# Patient Record
Sex: FEMALE | Race: WHITE | Employment: OTHER | URBAN - NONMETROPOLITAN AREA
[De-identification: names, ages, dates, MRNs, and addresses within clinical notes are randomized per-mention and may not be internally consistent; named-entity substitution may affect disease eponyms.]

---

## 2022-11-17 ENCOUNTER — HOSPITAL ENCOUNTER (INPATIENT)
Age: 87
LOS: 2 days | Discharge: HOME HEALTH | DRG: 392 | End: 2022-11-21
Attending: EMERGENCY MEDICINE | Admitting: INTERNAL MEDICINE
Payer: MEDICARE

## 2022-11-17 ENCOUNTER — APPOINTMENT (OUTPATIENT)
Dept: GENERAL RADIOLOGY | Age: 87
DRG: 392 | End: 2022-11-17
Attending: FAMILY MEDICINE
Payer: MEDICARE

## 2022-11-17 DIAGNOSIS — R07.9 CHEST PAIN, UNSPECIFIED TYPE: Primary | ICD-10-CM

## 2022-11-17 LAB
ALBUMIN SERPL-MCNC: 3.3 G/DL (ref 3.4–5)
ALBUMIN/GLOB SERPL: 0.9 {RATIO} (ref 0.8–1.7)
ALP SERPL-CCNC: 68 U/L (ref 45–117)
ALT SERPL-CCNC: 38 U/L (ref 13–56)
ANION GAP SERPL CALC-SCNC: 6 MMOL/L (ref 3–18)
AST SERPL W P-5'-P-CCNC: 26 U/L (ref 10–38)
BASOPHILS # BLD: 0.1 K/UL (ref 0–0.1)
BASOPHILS NFR BLD: 1 % (ref 0–2)
BILIRUB SERPL-MCNC: 0.6 MG/DL (ref 0.2–1)
BNP SERPL-MCNC: 94 PG/ML (ref 0–1800)
BUN SERPL-MCNC: 26 MG/DL (ref 7–18)
BUN/CREAT SERPL: 22 (ref 12–20)
CA-I BLD-MCNC: 10.2 MG/DL (ref 8.5–10.1)
CHLORIDE SERPL-SCNC: 101 MMOL/L (ref 100–111)
CO2 SERPL-SCNC: 31 MMOL/L (ref 21–32)
CREAT SERPL-MCNC: 1.17 MG/DL (ref 0.6–1.3)
DIFFERENTIAL METHOD BLD: ABNORMAL
EOSINOPHIL # BLD: 0.1 K/UL (ref 0–0.4)
EOSINOPHIL NFR BLD: 1 % (ref 0–5)
ERYTHROCYTE [DISTWIDTH] IN BLOOD BY AUTOMATED COUNT: 13.4 % (ref 11.6–14.5)
GLOBULIN SER CALC-MCNC: 3.6 G/DL (ref 2–4)
GLUCOSE SERPL-MCNC: 158 MG/DL (ref 74–99)
HCT VFR BLD AUTO: 44.9 % (ref 35–45)
HGB BLD-MCNC: 15.3 G/DL (ref 12–16)
IMM GRANULOCYTES # BLD AUTO: 0.1 K/UL (ref 0–0.04)
IMM GRANULOCYTES NFR BLD AUTO: 1 % (ref 0–0.5)
INR PPP: 1 (ref 0.8–1.2)
LIPASE SERPL-CCNC: 132 U/L (ref 73–393)
LYMPHOCYTES # BLD: 2.5 K/UL (ref 0.9–3.6)
LYMPHOCYTES NFR BLD: 23 % (ref 21–52)
MCH RBC QN AUTO: 32.1 PG (ref 24–34)
MCHC RBC AUTO-ENTMCNC: 34.1 G/DL (ref 31–37)
MCV RBC AUTO: 94.3 FL (ref 78–100)
MONOCYTES # BLD: 1 K/UL (ref 0.05–1.2)
MONOCYTES NFR BLD: 9 % (ref 3–10)
NEUTS SEG # BLD: 7.1 K/UL (ref 1.8–8)
NEUTS SEG NFR BLD: 65 % (ref 40–73)
NRBC # BLD: 0 K/UL (ref 0–0.01)
NRBC BLD-RTO: 0 PER 100 WBC
PLATELET # BLD AUTO: 131 K/UL (ref 135–420)
PMV BLD AUTO: 12.3 FL (ref 9.2–11.8)
POTASSIUM SERPL-SCNC: 3.6 MMOL/L (ref 3.5–5.5)
PROT SERPL-MCNC: 6.9 G/DL (ref 6.4–8.2)
PROTHROMBIN TIME: 14.1 SEC (ref 11.5–15.2)
RBC # BLD AUTO: 4.76 M/UL (ref 4.2–5.3)
SODIUM SERPL-SCNC: 138 MMOL/L (ref 136–145)
TROPONIN-HIGH SENSITIVITY: 56 NG/L (ref 0–54)
TROPONIN-HIGH SENSITIVITY: 58 NG/L (ref 0–54)
WBC # BLD AUTO: 10.9 K/UL (ref 4.6–13.2)

## 2022-11-17 PROCEDURE — 85610 PROTHROMBIN TIME: CPT

## 2022-11-17 PROCEDURE — 83690 ASSAY OF LIPASE: CPT

## 2022-11-17 PROCEDURE — 83880 ASSAY OF NATRIURETIC PEPTIDE: CPT

## 2022-11-17 PROCEDURE — 93005 ELECTROCARDIOGRAM TRACING: CPT

## 2022-11-17 PROCEDURE — G0378 HOSPITAL OBSERVATION PER HR: HCPCS

## 2022-11-17 PROCEDURE — 84484 ASSAY OF TROPONIN QUANT: CPT

## 2022-11-17 PROCEDURE — 74011250637 HC RX REV CODE- 250/637: Performed by: EMERGENCY MEDICINE

## 2022-11-17 PROCEDURE — 71046 X-RAY EXAM CHEST 2 VIEWS: CPT

## 2022-11-17 PROCEDURE — 99285 EMERGENCY DEPT VISIT HI MDM: CPT

## 2022-11-17 PROCEDURE — 80053 COMPREHEN METABOLIC PANEL: CPT

## 2022-11-17 PROCEDURE — 85025 COMPLETE CBC W/AUTO DIFF WBC: CPT

## 2022-11-17 RX ORDER — ONDANSETRON 4 MG/1
4 TABLET, ORALLY DISINTEGRATING ORAL
Status: DISCONTINUED | OUTPATIENT
Start: 2022-11-17 | End: 2022-11-21 | Stop reason: HOSPADM

## 2022-11-17 RX ORDER — INSULIN GLARGINE 100 [IU]/ML
15 INJECTION, SOLUTION SUBCUTANEOUS
COMMUNITY
Start: 2022-10-17

## 2022-11-17 RX ORDER — ONDANSETRON 2 MG/ML
4 INJECTION INTRAMUSCULAR; INTRAVENOUS
Status: DISCONTINUED | OUTPATIENT
Start: 2022-11-17 | End: 2022-11-21 | Stop reason: HOSPADM

## 2022-11-17 RX ORDER — MAG HYDROX/ALUMINUM HYD/SIMETH 200-200-20
30 SUSPENSION, ORAL (FINAL DOSE FORM) ORAL
Status: COMPLETED | OUTPATIENT
Start: 2022-11-17 | End: 2022-11-17

## 2022-11-17 RX ORDER — SODIUM CHLORIDE 0.9 % (FLUSH) 0.9 %
5-40 SYRINGE (ML) INJECTION EVERY 8 HOURS
Status: DISCONTINUED | OUTPATIENT
Start: 2022-11-17 | End: 2022-11-21 | Stop reason: HOSPADM

## 2022-11-17 RX ORDER — ACETAMINOPHEN 650 MG/1
650 SUPPOSITORY RECTAL
Status: DISCONTINUED | OUTPATIENT
Start: 2022-11-17 | End: 2022-11-21 | Stop reason: HOSPADM

## 2022-11-17 RX ORDER — CLONIDINE 0.1 MG/24H
PATCH, EXTENDED RELEASE TRANSDERMAL
COMMUNITY
Start: 2022-10-18

## 2022-11-17 RX ORDER — ACETAMINOPHEN 325 MG/1
650 TABLET ORAL
Status: DISCONTINUED | OUTPATIENT
Start: 2022-11-17 | End: 2022-11-21 | Stop reason: HOSPADM

## 2022-11-17 RX ORDER — GLIMEPIRIDE 2 MG/1
2 TABLET ORAL DAILY
COMMUNITY
Start: 2022-10-15

## 2022-11-17 RX ORDER — HYDRALAZINE HYDROCHLORIDE 20 MG/ML
10 INJECTION INTRAMUSCULAR; INTRAVENOUS
Status: DISCONTINUED | OUTPATIENT
Start: 2022-11-17 | End: 2022-11-21 | Stop reason: HOSPADM

## 2022-11-17 RX ORDER — ATORVASTATIN CALCIUM 40 MG/1
40 TABLET, FILM COATED ORAL DAILY
COMMUNITY
Start: 2022-11-08

## 2022-11-17 RX ORDER — AMLODIPINE BESYLATE 5 MG/1
5 TABLET ORAL DAILY
COMMUNITY
Start: 2022-11-08

## 2022-11-17 RX ORDER — ATENOLOL 50 MG/1
50 TABLET ORAL DAILY
COMMUNITY
Start: 2022-11-01

## 2022-11-17 RX ORDER — HYDROCHLOROTHIAZIDE 25 MG/1
25 TABLET ORAL DAILY
COMMUNITY
Start: 2022-11-08

## 2022-11-17 RX ORDER — POLYETHYLENE GLYCOL 3350 17 G/17G
17 POWDER, FOR SOLUTION ORAL DAILY PRN
Status: DISCONTINUED | OUTPATIENT
Start: 2022-11-17 | End: 2022-11-21 | Stop reason: HOSPADM

## 2022-11-17 RX ORDER — ENOXAPARIN SODIUM 100 MG/ML
40 INJECTION SUBCUTANEOUS DAILY
Status: DISCONTINUED | OUTPATIENT
Start: 2022-11-18 | End: 2022-11-21 | Stop reason: HOSPADM

## 2022-11-17 RX ORDER — SODIUM CHLORIDE 0.9 % (FLUSH) 0.9 %
5-40 SYRINGE (ML) INJECTION AS NEEDED
Status: DISCONTINUED | OUTPATIENT
Start: 2022-11-17 | End: 2022-11-21 | Stop reason: HOSPADM

## 2022-11-17 RX ADMIN — ALUMINA, MAGNESIA, AND SIMETHICONE ORAL SUSPENSION REGULAR STRENGTH 30 ML: 1200; 1200; 120 SUSPENSION ORAL at 20:44

## 2022-11-17 RX ADMIN — NITROGLYCERIN 0.5 INCH: 20 OINTMENT TOPICAL at 19:30

## 2022-11-17 NOTE — ED TRIAGE NOTES
Pt. Has had two days of intermittent chest pain. Pain radiates in her jaw. Pt. Called EMS because her pain was not getting better. EMS gave 324 mg of ASA and 1 NTG.

## 2022-11-18 ENCOUNTER — APPOINTMENT (OUTPATIENT)
Dept: NON INVASIVE DIAGNOSTICS | Age: 87
DRG: 392 | End: 2022-11-18
Attending: NURSE PRACTITIONER
Payer: MEDICARE

## 2022-11-18 PROBLEM — I10 HYPERTENSION: Status: ACTIVE | Noted: 2022-11-18

## 2022-11-18 PROBLEM — E11.9 DIABETES MELLITUS TYPE 2, CONTROLLED (HCC): Status: ACTIVE | Noted: 2022-11-18

## 2022-11-18 LAB
ALBUMIN SERPL-MCNC: 3.1 G/DL (ref 3.4–5)
ALBUMIN/GLOB SERPL: 1 {RATIO} (ref 0.8–1.7)
ALP SERPL-CCNC: 59 U/L (ref 45–117)
ALT SERPL-CCNC: 30 U/L (ref 13–56)
ANION GAP SERPL CALC-SCNC: 7 MMOL/L (ref 3–18)
AST SERPL W P-5'-P-CCNC: 22 U/L (ref 10–38)
ATRIAL RATE: 72 BPM
BASOPHILS # BLD: 0.1 K/UL (ref 0–0.1)
BASOPHILS NFR BLD: 1 % (ref 0–2)
BILIRUB SERPL-MCNC: 0.9 MG/DL (ref 0.2–1)
BUN SERPL-MCNC: 22 MG/DL (ref 7–18)
BUN/CREAT SERPL: 22 (ref 12–20)
CA-I BLD-MCNC: 9.7 MG/DL (ref 8.5–10.1)
CALCULATED P AXIS, ECG09: -26 DEGREES
CALCULATED R AXIS, ECG10: 12 DEGREES
CALCULATED T AXIS, ECG11: 29 DEGREES
CHLORIDE SERPL-SCNC: 102 MMOL/L (ref 100–111)
CO2 SERPL-SCNC: 31 MMOL/L (ref 21–32)
CREAT SERPL-MCNC: 1.02 MG/DL (ref 0.6–1.3)
DIAGNOSIS, 93000: NORMAL
DIFFERENTIAL METHOD BLD: ABNORMAL
ECHO AO ASC DIAM: 3.7 CM
ECHO AO ASCENDING AORTA INDEX: 2.2 CM/M2
ECHO AO ROOT DIAM: 3.4 CM
ECHO AO ROOT INDEX: 2.02 CM/M2
ECHO AV AREA PEAK VELOCITY: 2.7 CM2
ECHO AV AREA VTI: 2.8 CM2
ECHO AV AREA/BSA PEAK VELOCITY: 1.6 CM2/M2
ECHO AV AREA/BSA VTI: 1.7 CM2/M2
ECHO AV MEAN GRADIENT: 6 MMHG
ECHO AV MEAN VELOCITY: 1.1 M/S
ECHO AV PEAK GRADIENT: 10 MMHG
ECHO AV PEAK VELOCITY: 1.6 M/S
ECHO AV VELOCITY RATIO: 0.88
ECHO AV VTI: 36.2 CM
ECHO EST RA PRESSURE: 3 MMHG
ECHO IVC PROX: 1.9 CM
ECHO LA AREA 2C: 17.3 CM2
ECHO LA AREA 4C: 17.9 CM2
ECHO LA DIAMETER INDEX: 2.44 CM/M2
ECHO LA DIAMETER: 4.1 CM
ECHO LA MAJOR AXIS: 5.8 CM
ECHO LA MINOR AXIS: 5.6 CM
ECHO LA TO AORTIC ROOT RATIO: 1.21
ECHO LA VOL BP: 46 ML (ref 22–52)
ECHO LA VOL/BSA BIPLANE: 27 ML/M2 (ref 16–34)
ECHO LV E' LATERAL VELOCITY: 8 CM/S
ECHO LV E' SEPTAL VELOCITY: 6 CM/S
ECHO LV EDV A2C: 33 ML
ECHO LV EDV A4C: 53 ML
ECHO LV EDV INDEX A4C: 32 ML/M2
ECHO LV EDV NDEX A2C: 20 ML/M2
ECHO LV EJECTION FRACTION A2C: 70 %
ECHO LV EJECTION FRACTION A4C: 69 %
ECHO LV EJECTION FRACTION BIPLANE: 70 % (ref 55–100)
ECHO LV ESV A2C: 10 ML
ECHO LV ESV A4C: 16 ML
ECHO LV ESV INDEX A2C: 6 ML/M2
ECHO LV ESV INDEX A4C: 10 ML/M2
ECHO LV FRACTIONAL SHORTENING: 36 % (ref 28–44)
ECHO LV INTERNAL DIMENSION DIASTOLE INDEX: 2.14 CM/M2
ECHO LV INTERNAL DIMENSION DIASTOLIC: 3.6 CM (ref 3.9–5.3)
ECHO LV INTERNAL DIMENSION SYSTOLIC INDEX: 1.37 CM/M2
ECHO LV INTERNAL DIMENSION SYSTOLIC: 2.3 CM
ECHO LV IVSD: 1.2 CM (ref 0.6–0.9)
ECHO LV MASS 2D: 150.6 G (ref 67–162)
ECHO LV MASS INDEX 2D: 89.7 G/M2 (ref 43–95)
ECHO LV POSTERIOR WALL DIASTOLIC: 1.3 CM (ref 0.6–0.9)
ECHO LV RELATIVE WALL THICKNESS RATIO: 0.72
ECHO LVOT AREA: 3.1 CM2
ECHO LVOT AV VTI INDEX: 0.9
ECHO LVOT DIAM: 2 CM
ECHO LVOT MEAN GRADIENT: 4 MMHG
ECHO LVOT PEAK GRADIENT: 8 MMHG
ECHO LVOT PEAK VELOCITY: 1.4 M/S
ECHO LVOT STROKE VOLUME INDEX: 61.1 ML/M2
ECHO LVOT SV: 102.7 ML
ECHO LVOT VTI: 32.7 CM
ECHO MV A VELOCITY: 1.12 M/S
ECHO MV AREA VTI: 3.1 CM2
ECHO MV E DECELERATION TIME (DT): 368 MS
ECHO MV E VELOCITY: 0.82 M/S
ECHO MV E/A RATIO: 0.73
ECHO MV E/E' LATERAL: 10.25
ECHO MV E/E' RATIO (AVERAGED): 11.96
ECHO MV E/E' SEPTAL: 13.67
ECHO MV LVOT VTI INDEX: 1.02
ECHO MV MAX VELOCITY: 1.1 M/S
ECHO MV MEAN GRADIENT: 1 MMHG
ECHO MV MEAN VELOCITY: 0.5 M/S
ECHO MV PEAK GRADIENT: 4 MMHG
ECHO MV VTI: 33.5 CM
ECHO PV MAX VELOCITY: 1.1 M/S
ECHO PV PEAK GRADIENT: 5 MMHG
ECHO RA AREA 4C: 14.9 CM2
ECHO RA END SYSTOLIC VOLUME APICAL 4 CHAMBER INDEX BSA: 21 ML/M2
ECHO RA VOLUME: 36 ML
ECHO RIGHT VENTRICULAR SYSTOLIC PRESSURE (RVSP): 30 MMHG
ECHO RV BASAL DIMENSION: 3.1 CM
ECHO RV LONGITUDINAL DIMENSION: 5.5 CM
ECHO RV MID DIMENSION: 2.4 CM
ECHO RV TAPSE: 1.7 CM (ref 1.7–?)
ECHO TV REGURGITANT MAX VELOCITY: 2.6 M/S
ECHO TV REGURGITANT PEAK GRADIENT: 27 MMHG
EOSINOPHIL # BLD: 0.1 K/UL (ref 0–0.4)
EOSINOPHIL NFR BLD: 1 % (ref 0–5)
ERYTHROCYTE [DISTWIDTH] IN BLOOD BY AUTOMATED COUNT: 13.5 % (ref 11.6–14.5)
GLOBULIN SER CALC-MCNC: 3.1 G/DL (ref 2–4)
GLUCOSE BLD STRIP.AUTO-MCNC: 105 MG/DL (ref 70–110)
GLUCOSE BLD STRIP.AUTO-MCNC: 209 MG/DL (ref 70–110)
GLUCOSE BLD STRIP.AUTO-MCNC: 245 MG/DL (ref 70–110)
GLUCOSE SERPL-MCNC: 175 MG/DL (ref 74–99)
HCT VFR BLD AUTO: 41.8 % (ref 35–45)
HGB BLD-MCNC: 13.9 G/DL (ref 12–16)
IMM GRANULOCYTES # BLD AUTO: 0 K/UL (ref 0–0.04)
IMM GRANULOCYTES NFR BLD AUTO: 0 % (ref 0–0.5)
LYMPHOCYTES # BLD: 2.8 K/UL (ref 0.9–3.6)
LYMPHOCYTES NFR BLD: 29 % (ref 21–52)
MAGNESIUM SERPL-MCNC: 1.8 MG/DL (ref 1.6–2.6)
MCH RBC QN AUTO: 31.6 PG (ref 24–34)
MCHC RBC AUTO-ENTMCNC: 33.3 G/DL (ref 31–37)
MCV RBC AUTO: 95 FL (ref 78–100)
MONOCYTES # BLD: 1 K/UL (ref 0.05–1.2)
MONOCYTES NFR BLD: 10 % (ref 3–10)
NEUTS SEG # BLD: 5.8 K/UL (ref 1.8–8)
NEUTS SEG NFR BLD: 59 % (ref 40–73)
NRBC # BLD: 0 K/UL (ref 0–0.01)
NRBC BLD-RTO: 0 PER 100 WBC
P-R INTERVAL, ECG05: 190 MS
PERFORMED BY, TECHID: ABNORMAL
PERFORMED BY, TECHID: ABNORMAL
PERFORMED BY, TECHID: NORMAL
PLATELET # BLD AUTO: 122 K/UL (ref 135–420)
PMV BLD AUTO: 12.5 FL (ref 9.2–11.8)
POTASSIUM SERPL-SCNC: 3.4 MMOL/L (ref 3.5–5.5)
PROT SERPL-MCNC: 6.2 G/DL (ref 6.4–8.2)
Q-T INTERVAL, ECG07: 393 MS
QRS DURATION, ECG06: 80 MS
QTC CALCULATION (BEZET), ECG08: 431 MS
RBC # BLD AUTO: 4.4 M/UL (ref 4.2–5.3)
SODIUM SERPL-SCNC: 140 MMOL/L (ref 136–145)
TROPONIN-HIGH SENSITIVITY: 59 NG/L (ref 0–54)
TROPONIN-HIGH SENSITIVITY: 60 NG/L (ref 0–54)
VENTRICULAR RATE, ECG03: 72 BPM
WBC # BLD AUTO: 9.8 K/UL (ref 4.6–13.2)

## 2022-11-18 PROCEDURE — 74011250636 HC RX REV CODE- 250/636: Performed by: NURSE PRACTITIONER

## 2022-11-18 PROCEDURE — 80053 COMPREHEN METABOLIC PANEL: CPT

## 2022-11-18 PROCEDURE — 84484 ASSAY OF TROPONIN QUANT: CPT

## 2022-11-18 PROCEDURE — 74011636637 HC RX REV CODE- 636/637: Performed by: NURSE PRACTITIONER

## 2022-11-18 PROCEDURE — 83735 ASSAY OF MAGNESIUM: CPT

## 2022-11-18 PROCEDURE — G0378 HOSPITAL OBSERVATION PER HR: HCPCS

## 2022-11-18 PROCEDURE — 74011000250 HC RX REV CODE- 250: Performed by: NURSE PRACTITIONER

## 2022-11-18 PROCEDURE — 74011250637 HC RX REV CODE- 250/637: Performed by: NURSE PRACTITIONER

## 2022-11-18 PROCEDURE — 36415 COLL VENOUS BLD VENIPUNCTURE: CPT

## 2022-11-18 PROCEDURE — 85025 COMPLETE CBC W/AUTO DIFF WBC: CPT

## 2022-11-18 PROCEDURE — 82962 GLUCOSE BLOOD TEST: CPT

## 2022-11-18 PROCEDURE — 74011250637 HC RX REV CODE- 250/637: Performed by: INTERNAL MEDICINE

## 2022-11-18 PROCEDURE — 93306 TTE W/DOPPLER COMPLETE: CPT

## 2022-11-18 PROCEDURE — 96372 THER/PROPH/DIAG INJ SC/IM: CPT

## 2022-11-18 RX ORDER — MAG HYDROX/ALUMINUM HYD/SIMETH 200-200-20
30 SUSPENSION, ORAL (FINAL DOSE FORM) ORAL
Status: DISCONTINUED | OUTPATIENT
Start: 2022-11-18 | End: 2022-11-21 | Stop reason: HOSPADM

## 2022-11-18 RX ORDER — AMLODIPINE BESYLATE 5 MG/1
5 TABLET ORAL DAILY
Status: DISCONTINUED | OUTPATIENT
Start: 2022-11-18 | End: 2022-11-21 | Stop reason: HOSPADM

## 2022-11-18 RX ORDER — GUAIFENESIN 100 MG/5ML
81 LIQUID (ML) ORAL DAILY
Status: DISCONTINUED | OUTPATIENT
Start: 2022-11-18 | End: 2022-11-21 | Stop reason: HOSPADM

## 2022-11-18 RX ORDER — INSULIN LISPRO 100 [IU]/ML
INJECTION, SOLUTION INTRAVENOUS; SUBCUTANEOUS
Status: DISCONTINUED | OUTPATIENT
Start: 2022-11-18 | End: 2022-11-21 | Stop reason: HOSPADM

## 2022-11-18 RX ORDER — FAMOTIDINE 20 MG/1
10 TABLET, FILM COATED ORAL 2 TIMES DAILY
Status: DISCONTINUED | OUTPATIENT
Start: 2022-11-18 | End: 2022-11-21 | Stop reason: HOSPADM

## 2022-11-18 RX ORDER — CLONIDINE 0.1 MG/24H
1 PATCH, EXTENDED RELEASE TRANSDERMAL
Status: DISCONTINUED | OUTPATIENT
Start: 2022-11-27 | End: 2022-11-20

## 2022-11-18 RX ORDER — INSULIN GLARGINE 100 [IU]/ML
15 INJECTION, SOLUTION SUBCUTANEOUS
Status: DISCONTINUED | OUTPATIENT
Start: 2022-11-18 | End: 2022-11-21 | Stop reason: HOSPADM

## 2022-11-18 RX ORDER — HYDROCHLOROTHIAZIDE 25 MG/1
25 TABLET ORAL DAILY
Status: DISCONTINUED | OUTPATIENT
Start: 2022-11-18 | End: 2022-11-21 | Stop reason: HOSPADM

## 2022-11-18 RX ORDER — ATORVASTATIN CALCIUM 40 MG/1
40 TABLET, FILM COATED ORAL DAILY
Status: DISCONTINUED | OUTPATIENT
Start: 2022-11-18 | End: 2022-11-21 | Stop reason: HOSPADM

## 2022-11-18 RX ORDER — ATENOLOL 25 MG/1
50 TABLET ORAL DAILY
Status: DISCONTINUED | OUTPATIENT
Start: 2022-11-18 | End: 2022-11-21 | Stop reason: HOSPADM

## 2022-11-18 RX ORDER — CLONIDINE 0.1 MG/24H
1 PATCH, EXTENDED RELEASE TRANSDERMAL
Status: DISCONTINUED | OUTPATIENT
Start: 2022-11-18 | End: 2022-11-18

## 2022-11-18 RX ADMIN — ASPIRIN 81 MG 81 MG: 81 TABLET ORAL at 09:02

## 2022-11-18 RX ADMIN — INSULIN LISPRO 4 UNITS: 100 INJECTION, SOLUTION INTRAVENOUS; SUBCUTANEOUS at 21:51

## 2022-11-18 RX ADMIN — INSULIN GLARGINE 15 UNITS: 100 INJECTION, SOLUTION SUBCUTANEOUS at 21:52

## 2022-11-18 RX ADMIN — HYDROCHLOROTHIAZIDE 25 MG: 25 TABLET ORAL at 09:03

## 2022-11-18 RX ADMIN — INSULIN LISPRO 4 UNITS: 100 INJECTION, SOLUTION INTRAVENOUS; SUBCUTANEOUS at 11:37

## 2022-11-18 RX ADMIN — SODIUM CHLORIDE, PRESERVATIVE FREE 10 ML: 5 INJECTION INTRAVENOUS at 14:03

## 2022-11-18 RX ADMIN — FAMOTIDINE 10 MG: 20 TABLET, FILM COATED ORAL at 17:22

## 2022-11-18 RX ADMIN — NITROGLYCERIN 1 INCH: 20 OINTMENT TOPICAL at 17:22

## 2022-11-18 RX ADMIN — SODIUM CHLORIDE, PRESERVATIVE FREE 10 ML: 5 INJECTION INTRAVENOUS at 21:52

## 2022-11-18 RX ADMIN — NITROGLYCERIN 1 INCH: 20 OINTMENT TOPICAL at 06:40

## 2022-11-18 RX ADMIN — ENOXAPARIN SODIUM 40 MG: 100 INJECTION SUBCUTANEOUS at 09:06

## 2022-11-18 RX ADMIN — ATORVASTATIN CALCIUM 40 MG: 40 TABLET, FILM COATED ORAL at 09:03

## 2022-11-18 RX ADMIN — SODIUM CHLORIDE, PRESERVATIVE FREE 10 ML: 5 INJECTION INTRAVENOUS at 05:17

## 2022-11-18 RX ADMIN — AMLODIPINE BESYLATE 5 MG: 5 TABLET ORAL at 09:03

## 2022-11-18 RX ADMIN — ATENOLOL 50 MG: 25 TABLET ORAL at 09:03

## 2022-11-18 NOTE — H&P
History and Physical    Subjective:     Jill Beltre is a 82 y.o. female  has a past medical history of Diabetes ([facility] [address] High cholesterol, and Hypertension. Patient seen at bedside. Patient came to emergency room tonight with chest pain to her epigastric area that she explained as a pressure and it did radiate to her jaw and she has had it for a week. Patient states she has had the pain on and off for a week she has not taken anything for the pain or called her primary care doctor. Patient states she thought it was gas and was trying to belch. At this point in time patient states she is pain-free except for on exam she was found tender in the epigastric area. Patient will be admitted to the hospitalist group on observation no echo is found in the history I will order an echocardiogram cardiology consult serial labs and troponins. Past Medical History:   Diagnosis Date    Diabetes ([facility] [address]     High cholesterol     Hypertension       Past Surgical History:   Procedure Laterality Date    HX CHOLECYSTECTOMY      HX GYN      HX HYSTERECTOMY      HX THORACOTOMY       History reviewed. No pertinent family history. Social History     Tobacco Use    Smoking status: Never    Smokeless tobacco: Never   Substance Use Topics    Alcohol use: Not Currently       Prior to Admission medications    Medication Sig Start Date End Date Taking? Authorizing Provider   amLODIPine (NORVASC) 5 mg tablet Take 5 mg by mouth daily. 11/8/22  Yes Other, MD Greyson   atenoloL (TENORMIN) 50 mg tablet Take 50 mg by mouth daily. 11/1/22  Yes Other, MD Greyson   atorvastatin (LIPITOR) 40 mg tablet Take 40 mg by mouth daily. 11/8/22  Yes Trini, MD Greyson   cloNIDine (CATAPRES) 0.1 mg/24 hr ptwk APPLY 1 PATCH EVERY WEEK AS DIRECTED. STOP CLONIDINE TABLETS 10/18/22  Yes Other, MD Greyson   glimepiride (AMARYL) 2 mg tablet Take 2 mg by mouth daily.  10/15/22  Yes Trini, MD Greyson   hydroCHLOROthiazide (HYDRODIURIL) 25 mg tablet Take 25 mg by mouth daily. 11/8/22  Yes Other, MD Joseline Rubi U-100 Insulin 100 unit/mL (3 mL) inpn 15 Units by SubCUTAneous route nightly. 10/17/22  Yes Other, MD Greyson     Allergies   Allergen Reactions    Demerol [Meperidine] Not Reported This Time         Review of Systems   Constitutional:  Negative for fever. Respiratory:  Negative for shortness of breath. Cardiovascular:  Positive for chest pain. Negative for leg swelling. Neurological:  Negative for dizziness. All other systems reviewed and are negative. Objective:   VITALS:    Visit Vitals  BP (!) 160/64   Pulse (!) 54   Temp 98.2 °F (36.8 °C)   Resp 20   Ht 5' 1\" (1.549 m)   Wt 69.2 kg (152 lb 9.6 oz)   SpO2 92%   BMI 28.83 kg/m²       Physical Exam  Vitals and nursing note reviewed. Constitutional:       Appearance: Normal appearance. She is well-developed. HENT:      Head: Normocephalic and atraumatic. Eyes:      Conjunctiva/sclera: Conjunctivae normal.      Pupils: Pupils are equal, round, and reactive to light. Cardiovascular:      Rate and Rhythm: Normal rate and regular rhythm. Pulses: Normal pulses. Heart sounds: Normal heart sounds. Pulmonary:      Effort: Pulmonary effort is normal. No respiratory distress. Breath sounds: Normal breath sounds. Abdominal:      General: Bowel sounds are normal.      Palpations: Abdomen is soft. Musculoskeletal:         General: Normal range of motion. Cervical back: Normal range of motion and neck supple. Right lower leg: No edema. Left lower leg: No edema. Skin:     General: Skin is warm and dry. Capillary Refill: Capillary refill takes less than 2 seconds. Neurological:      Mental Status: She is alert and oriented to person, place, and time. Psychiatric:         Behavior: Behavior normal.         Thought Content:  Thought content normal.         Judgment: Judgment normal. _______________________________________________________________________  Care Plan discussed with:    Comments   Patient X    Family      RN X    Care Manager                    Consultant:      _______________________________________________________________________  Expected  Disposition:   Home with Family X   HH/PT/OT/RN    SNF/LTC    CARLOS    ________________________________________________________________________  TOTAL TIME:  48 Minutes    Critical Care Provided     Minutes non procedure based      Comments    X Reviewed previous records   >50% of visit spent in counseling and coordination of care X Discussion with patient and/or family and questions answered       ________________________________________________________________________    Labs:  Recent Results (from the past 24 hour(s))   EKG, 12 LEAD, INITIAL    Collection Time: 11/17/22  6:46 PM   Result Value Ref Range    Ventricular Rate 72 BPM    Atrial Rate 72 BPM    P-R Interval 190 ms    QRS Duration 80 ms    Q-T Interval 393 ms    QTC Calculation (Bezet) 431 ms    Calculated P Axis -26 degrees    Calculated R Axis 12 degrees    Calculated T Axis 29 degrees    Diagnosis Sinus rhythm    CBC WITH AUTOMATED DIFF    Collection Time: 11/17/22  6:58 PM   Result Value Ref Range    WBC 10.9 4.6 - 13.2 K/uL    RBC 4.76 4.20 - 5.30 M/uL    HGB 15.3 12.0 - 16.0 g/dL    HCT 44.9 35.0 - 45.0 %    MCV 94.3 78.0 - 100.0 FL    MCH 32.1 24.0 - 34.0 PG    MCHC 34.1 31.0 - 37.0 g/dL    RDW 13.4 11.6 - 14.5 %    PLATELET 407 (L) 197 - 420 K/uL    MPV 12.3 (H) 9.2 - 11.8 FL    NRBC 0.0 0.0  WBC    ABSOLUTE NRBC 0.00 0.00 - 0.01 K/uL    NEUTROPHILS 65 40 - 73 %    LYMPHOCYTES 23 21 - 52 %    MONOCYTES 9 3 - 10 %    EOSINOPHILS 1 0 - 5 %    BASOPHILS 1 0 - 2 %    IMMATURE GRANULOCYTES 1 (H) 0 - 0.5 %    ABS. NEUTROPHILS 7.1 1.8 - 8.0 K/UL    ABS. LYMPHOCYTES 2.5 0.9 - 3.6 K/UL    ABS. MONOCYTES 1.0 0.05 - 1.2 K/UL    ABS. EOSINOPHILS 0.1 0.0 - 0.4 K/UL    ABS. BASOPHILS 0.1 0.0 - 0.1 K/UL    ABS. IMM. GRANS. 0.1 (H) 0.00 - 0.04 K/UL    DF AUTOMATED     METABOLIC PANEL, COMPREHENSIVE    Collection Time: 11/17/22  6:58 PM   Result Value Ref Range    Sodium 138 136 - 145 mmol/L    Potassium 3.6 3.5 - 5.5 mmol/L    Chloride 101 100 - 111 mmol/L    CO2 31 21 - 32 mmol/L    Anion gap 6 3.0 - 18.0 mmol/L    Glucose 158 (H) 74 - 99 mg/dL    BUN 26 (H) 7 - 18 mg/dL    Creatinine 1.17 0.60 - 1.30 mg/dL    BUN/Creatinine ratio 22 (H) 12 - 20      eGFR 46 (L) >60 ml/min/1.73m2    Calcium 10.2 (H) 8.5 - 10.1 mg/dL    Bilirubin, total 0.6 0.2 - 1.0 mg/dL    AST (SGOT) 26 10 - 38 U/L    ALT (SGPT) 38 13 - 56 U/L    Alk.  phosphatase 68 45 - 117 U/L    Protein, total 6.9 6.4 - 8.2 g/dL    Albumin 3.3 (L) 3.4 - 5.0 g/dL    Globulin 3.6 2.0 - 4.0 g/dL    A-G Ratio 0.9 0.8 - 1.7     NT-PRO BNP    Collection Time: 11/17/22  6:58 PM   Result Value Ref Range    NT pro-BNP 94 0 - 1,800 pg/mL   TROPONIN-HIGH SENSITIVITY    Collection Time: 11/17/22  6:58 PM   Result Value Ref Range    Troponin-High Sensitivity 56 (H) 0 - 54 ng/L   LIPASE    Collection Time: 11/17/22  6:58 PM   Result Value Ref Range    Lipase 132 73 - 393 U/L   PROTHROMBIN TIME + INR    Collection Time: 11/17/22  6:58 PM   Result Value Ref Range    Prothrombin time 14.1 11.5 - 15.2 sec    INR 1.0 0.8 - 1.2     TROPONIN-HIGH SENSITIVITY    Collection Time: 11/17/22  9:04 PM   Result Value Ref Range    Troponin-High Sensitivity 58 (H) 0 - 54 ng/L   METABOLIC PANEL, COMPREHENSIVE    Collection Time: 11/18/22  3:30 AM   Result Value Ref Range    Sodium 140 136 - 145 mmol/L    Potassium 3.4 (L) 3.5 - 5.5 mmol/L    Chloride 102 100 - 111 mmol/L    CO2 31 21 - 32 mmol/L    Anion gap 7 3.0 - 18.0 mmol/L    Glucose 175 (H) 74 - 99 mg/dL    BUN 22 (H) 7 - 18 mg/dL    Creatinine 1.02 0.60 - 1.30 mg/dL    BUN/Creatinine ratio 22 (H) 12 - 20      eGFR 54 (L) >60 ml/min/1.73m2    Calcium 9.7 8.5 - 10.1 mg/dL    Bilirubin, total 0.9 0.2 - 1.0 mg/dL    AST (SGOT) 22 10 - 38 U/L    ALT (SGPT) 30 13 - 56 U/L    Alk. phosphatase 59 45 - 117 U/L    Protein, total 6.2 (L) 6.4 - 8.2 g/dL    Albumin 3.1 (L) 3.4 - 5.0 g/dL    Globulin 3.1 2.0 - 4.0 g/dL    A-G Ratio 1.0 0.8 - 1.7     MAGNESIUM    Collection Time: 11/18/22  3:30 AM   Result Value Ref Range    Magnesium 1.8 1.6 - 2.6 mg/dL   CBC WITH AUTOMATED DIFF    Collection Time: 11/18/22  3:30 AM   Result Value Ref Range    WBC 9.8 4.6 - 13.2 K/uL    RBC 4.40 4.20 - 5.30 M/uL    HGB 13.9 12.0 - 16.0 g/dL    HCT 41.8 35.0 - 45.0 %    MCV 95.0 78.0 - 100.0 FL    MCH 31.6 24.0 - 34.0 PG    MCHC 33.3 31.0 - 37.0 g/dL    RDW 13.5 11.6 - 14.5 %    PLATELET 115 (L) 965 - 420 K/uL    MPV 12.5 (H) 9.2 - 11.8 FL    NRBC 0.0 0.0  WBC    ABSOLUTE NRBC 0.00 0.00 - 0.01 K/uL    NEUTROPHILS 59 40 - 73 %    LYMPHOCYTES 29 21 - 52 %    MONOCYTES 10 3 - 10 %    EOSINOPHILS 1 0 - 5 %    BASOPHILS 1 0 - 2 %    IMMATURE GRANULOCYTES 0 0 - 0.5 %    ABS. NEUTROPHILS 5.8 1.8 - 8.0 K/UL    ABS. LYMPHOCYTES 2.8 0.9 - 3.6 K/UL    ABS. MONOCYTES 1.0 0.05 - 1.2 K/UL    ABS. EOSINOPHILS 0.1 0.0 - 0.4 K/UL    ABS. BASOPHILS 0.1 0.0 - 0.1 K/UL    ABS. IMM. GRANS. 0.0 0.00 - 0.04 K/UL    DF AUTOMATED     TROPONIN-HIGH SENSITIVITY    Collection Time: 11/18/22  3:30 AM   Result Value Ref Range    Troponin-High Sensitivity 60 (H) 0 - 54 ng/L       Imaging:  XR CHEST PA LAT    Result Date: 11/17/2022  EXAM: Two-view chest CLINICAL HISTORY: chest pain , COMPARISON: None FINDINGS: Frontal and lateral views of the chest demonstrate clear lungs. Cardiac silhouette is normal in size and contour. No acute bony or soft tissue abnormality. No acute pulmonary process identified.        Assessment & Plan:       Chest pain  Chest pain is more epigastric  Patient was trying to belch Maalox helped in the ED  Cardiology consult  Troponins slightly positive we will repeat  Echocardiogram ordered  Aspirin daily  Nitroglycerin Q 12    Diabetes mellitus type 2, controlled ([address]  Is a chronic problem  Hold oral hypoglycemics at this time  Continue Lantus  Sliding scale AC at bedtime  Diabetic diet    Hypertension  This is a chronic problem  Continue hydrochlorothiazide, Tenormin, Norvasc        Code Status: Full      Prophylaxis: Lovenox      Electronically Signed : Javier Gorman ENP-C, FNP-C, [address] voice recognition was used to generate this report, which may have resulted in some phonetic based errors in grammar and contents.  Even though attempts were made to correct all the mistakes, some may have been missed, and remained in the body of the document

## 2022-11-18 NOTE — ASSESSMENT & PLAN NOTE
Chest pain is more epigastric  Patient was trying to belch Maalox helped in the ED  Cardiology consult  Troponins slightly positive we will repeat  Echocardiogram ordered  Aspirin daily  Nitroglycerin Q 12

## 2022-11-18 NOTE — PROGRESS NOTES
Care Management Interventions  PCP Verified by CM: Yes  Palliative Care Criteria Met (RRAT>21 & CHF Dx)?: No  Transition of Care Consult (CM Consult): Discharge Planning  Physical Therapy Consult: No  Occupational Therapy Consult: No  Speech Therapy Consult: No  Support Systems: Child(jihan)  Confirm Follow Up Transport: Family  The Plan for Transition of Care is Related to the Following Treatment Goals : Patient centered discharge planning to ensure smooth transition to community and PLOF. Discharge Location  Patient Expects to be Discharged to[de-identified] Home with home health      Chart reviewed and pt interviewed. Pt placed in OBS after presenting to ED with complaints of chest pain. Pt lives with her daughter. Uses a cane and rollator when needed. Does not need any further DME. Does feel like she could benefit from [facility]. Chooses [facility].   Faxed facesheet and H&P.

## 2022-11-18 NOTE — CONSULTS
CARDIOLOGY CONSULTATION      REASON FOR CONSULT: Chest pain    REQUESTING PROVIDER: Lico Patrick NP    CHIEF COMPLAINT:  Chest pain    HISTORY OF PRESENT ILLNESS:  Nilson Mao is a 82y.o. year-old female with past medical history significant for hypertension, hyperlipidemia, SVT, CVA, Crohns, DM who was evaluated today due to chest pain. She states she has been having chest pain intermittently for the last 2 weeks. It feels like burning, and occurs at rest and with exertion. It is worse at night when she lays down. No associated symptoms. Symptoms improved with tums and nitro. She notes lots of belching. She does note intermittent palpitations. Seen in 5/22 in the ED for CP and SVT, she feels this is different. She reports a lot of leg pain. No recent stress testing. Records from hospital admission course thus far reviewed. Telemetry reviewed. No events overnight. INPATIENT MEDICATIONS:  Home medications reviewed.     Current Facility-Administered Medications:     amLODIPine (NORVASC) tablet 5 mg, 5 mg, Oral, DAILY, Amanda Horvath NP, 5 mg at 11/18/22 0903    atenoloL (TENORMIN) tablet 50 mg, 50 mg, Oral, DAILY, Oneyda TORRES NP, 50 mg at 11/18/22 6459    atorvastatin (LIPITOR) tablet 40 mg, 40 mg, Oral, DAILY, Oneyda TORRES NP, 40 mg at 11/18/22 1020    hydroCHLOROthiazide (HYDRODIURIL) tablet 25 mg, 25 mg, Oral, DAILY, Oneyda TORRES NP, 25 mg at 11/18/22 0903    insulin glargine (LANTUS) injection 15 Units, 15 Units, SubCUTAneous, QHS, Amanda Lopez NP    insulin lispro (HUMALOG) injection, , SubCUTAneous, AC&HS, Oneyda TORRES NP    aspirin chewable tablet 81 mg, 81 mg, Oral, DAILY, Oneyda TORRES NP, 81 mg at 11/18/22 0902    [START ON 11/27/2022] cloNIDine (CATAPRES) 0.1 mg/24 hr patch 1 Patch, 1 Patch, TransDERmal, Q7D, Cindi Castillo MD    sodium chloride (NS) flush 5-40 mL, 5-40 mL, IntraVENous, Q8H, Amanda Lopez NP, 10 mL at 11/18/22 0517    sodium chloride (NS) flush 5-40 mL, 5-40 mL, IntraVENous, PRN, Gilberto TORRES NP    acetaminophen (TYLENOL) tablet 650 mg, 650 mg, Oral, Q6H PRN **OR** acetaminophen (TYLENOL) suppository 650 mg, 650 mg, Rectal, Q6H PRN, Gilberto TORRES NP    polyethylene glycol (MIRALAX) packet 17 g, 17 g, Oral, DAILY PRN, Gilberto TORRES NP    ondansetron (ZOFRAN ODT) tablet 4 mg, 4 mg, Oral, Q8H PRN **OR** ondansetron (ZOFRAN) injection 4 mg, 4 mg, IntraVENous, Q6H PRN, Gilberto TORRES NP    enoxaparin (LOVENOX) injection 40 mg, 40 mg, SubCUTAneous, DAILY, Gilberto TORRES NP, 40 mg at 11/18/22 5680    nitroglycerin (NITROBID) 2 % ointment 1 Inch, 1 Inch, Topical, BID, Gilberto TORRES, ARIANA, 1 Inch at 11/18/22 0640    hydrALAZINE (APRESOLINE) 20 mg/mL injection 10 mg, 10 mg, IntraVENous, Q6H PRN, Ania Balderrama NP     ALLERGIES:  Allergies reviewed with the patient,  Allergies   Allergen Reactions    Demerol [Meperidine] Not Reported This Time    . FAMILY HISTORY:  Family history reviewed. SOCIAL HISTORY:  Notable for no tobacco use, no heavy alcohol or illicit drug use. REVIEW OF SYSTEMS:  Complete review of systems performed, pertinents noted above, all other systems are negative. PHYSICAL EXAMINATION:    General:  Alert, in NAD  Cardiovascular:  Bradycardic, some ectopy, chest wall tender to palpation  Respiratory:  Clear to auscultation  Abdomen:  Soft  Extremities:  Tender    Visit Vitals  BP (!) 175/67 (BP 1 Location: Right upper arm, BP Patient Position: At rest)   Pulse (!) 57   Temp 98.2 °F (36.8 °C)   Resp 22   Ht 5' 1\" (1.549 m)   Wt 68.9 kg (152 lb)   SpO2 95%   BMI 28.72 kg/m²         Recent labs results and imaging reviewed.          Discussed case with Dr. Dicky Jeans and our impression and recommendations are as follows:  Chest pain, seems atypical but multiple risk factors noted  Trop 55-71-51-57 and ECG nonischemic  Echo ordered to assess EF, wall motion  Some symptoms persist, if not improved would stress on Monday. If symptoms improve and echo normal, can follow up in office for stress testing as an OP  Hypertension, blood pressure is above goal, resume home hydralazine, can increase amlodipine to 10 mg daily  Hyperlipidemia:  Continue statin  SVT, currently sinus bradycardia on atenolol  Echo as noted above  Monitor and possible EP eval as OP  DM, per primary    Thank you for involving us in the care of this patient. Please do not hesitate to call me or Dr. Mack Else if additional questions arise.     Leslee Fernandez NP  11/18/2022

## 2022-11-18 NOTE — PROGRESS NOTES
Hospitalist Progress Note             Date of Service:  2022  NAME:  Jayashree Mac  :  1935  MRN:  [id number]      Admission Summary:   Jayashree aMc is a 82 y.o. female  has a past medical history of Diabetes ([address] High cholesterol, and Hypertension. Patient seen at bedside. Patient came to emergency room tonight with chest pain to her epigastric area that she explained as a pressure and it did radiate to her jaw and she has had it for a week. Patient states she has had the pain on and off for a week she has not taken anything for the pain or called her primary care doctor. Patient states she thought it was gas and was trying to belch. At this point in time patient states she is pain-free except for on exam she was found tender in the epigastric area. Patient will be admitted to the hospitalist group on observation no echo is found in the history I will order an echocardiogram cardiology consult serial labs and troponins.      Assessment & Plan:         Chest pain  Chest pain is more epigastric  Patient was trying to belch Maalox helped in the ED  Cardiology consult  Troponins slightly positive we will repeat  Echocardiogram ordered  Aspirin daily  Nitroglycerin Q 12     Diabetes mellitus type 2, controlled ([address]  Is a chronic problem  Hold oral hypoglycemics at this time  Continue Lantus  Sliding scale AC at bedtime  Diabetic diet     Hypertension  This is a chronic problem  Continue hydrochlorothiazide, Tenormin, Norvasc       Pt still having chest tightness, I suspect this may be GI, cardiology recommends inpt stress testing on Monday since her symptoms persist  Will also start some gi cocktail    Hospital Problems  Never Reviewed            Codes Class Noted POA    Hypertension ICD-10-CM: I10  ICD-9-CM: 401.9  2022 Unknown        Diabetes mellitus type 2, controlled ([address] ICD-10-CM: E11.9  ICD-9-CM: 250.00  11/18/2022 Unknown        Chest pain ICD-10-CM: R07.9  ICD-9-CM: 786.50  11/17/2022 Unknown             Review of Systems:   A comprehensive review of systems was negative except for that written in the HPI. Vital Signs:    Last 24hrs VS reviewed since prior progress note. Most recent are:  Visit Vitals  /87 (BP 1 Location: Right upper arm, BP Patient Position: At rest)   Pulse (!) 57   Temp 98.3 °F (36.8 °C)   Resp 11   Ht 5' 1\" (1.549 m)   Wt 68.9 kg (152 lb)   SpO2 96%   BMI 28.72 kg/m²         Intake/Output Summary (Last 24 hours) at 11/18/2022 1520  Last data filed at 11/18/2022 0608  Gross per 24 hour   Intake 100 ml   Output --   Net 100 ml        Physical Examination:             General:          Alert, cooperative, no distress, appears stated age. HEENT:           Atraumatic, anicteric sclerae, pink conjunctivae                          No oral ulcers, mucosa moist, throat clear, dentition fair  Neck:               Supple, symmetrical  Lungs:             Clear to auscultation bilaterally. No Wheezing or Rhonchi. No rales. Chest wall:      No tenderness  No Accessory muscle use. Heart:              Regular  rhythm,  No  murmur   No edema  Abdomen:        Soft, non-tender. Not distended. Bowel sounds normal  Extremities:     No cyanosis. No clubbing,                            Skin turgor normal, Capillary refill normal  Skin:                Not pale. Not Jaundiced  No rashes   Psych:             Not anxious or agitated.   Neurologic:      Alert, moves all extremities, answers questions appropriately and responds to commands        Data Review:    Review and/or order of clinical lab test  Review and/or order of tests in the radiology section of CPT  Review and/or order of tests in the medicine section of CPT      Labs:     Recent Labs     11/18/22  0330 11/17/22  1858   WBC 9.8 10.9   HGB 13.9 15.3   HCT 41.8 44.9   * 131*     Recent Labs 11/18/22  0330 11/17/22 1858    138   K 3.4* 3.6    101   CO2 31 31   BUN 22* 26*   CREA 1.02 1.17   * 158*   CA 9.7 10.2*   MG 1.8  --      Recent Labs     11/18/22  0330 11/17/22 1858   ALT 30 38   AP 59 68   TBILI 0.9 0.6   TP 6.2* 6.9   ALB 3.1* 3.3*   GLOB 3.1 3.6   LPSE  --  132     Recent Labs     11/17/22 1858   INR 1.0   PTP 14.1      No results for input(s): FE, TIBC, PSAT, FERR in the last 72 hours. No results found for: FOL, RBCF   No results for input(s): PH, PCO2, PO2 in the last 72 hours. No results for input(s): CPK, CKNDX, TROIQ in the last 72 hours.     No lab exists for component: CPKMB  No results found for: CHOL, CHOLX, CHLST, CHOLV, HDL, HDLP, LDL, LDLC, DLDLP, TGLX, TRIGL, TRIGP, CHHD, CHHDX  Lab Results   Component Value Date/Time    Glucose (POC) 245 (H) 11/18/2022 11:17 AM     No results found for: COLOR, APPRN, SPGRU, REFSG, SOSA, PROTU, GLUCU, KETU, BILU, UROU, FRANCHESCA, LEUKU, GLUKE, EPSU, BACTU, WBCU, RBCU, CASTS, UCRY      Medications Reviewed:     Current Facility-Administered Medications   Medication Dose Route Frequency    amLODIPine (NORVASC) tablet 5 mg  5 mg Oral DAILY    atenoloL (TENORMIN) tablet 50 mg  50 mg Oral DAILY    atorvastatin (LIPITOR) tablet 40 mg  40 mg Oral DAILY    hydroCHLOROthiazide (HYDRODIURIL) tablet 25 mg  25 mg Oral DAILY    insulin glargine (LANTUS) injection 15 Units  15 Units SubCUTAneous QHS    insulin lispro (HUMALOG) injection   SubCUTAneous AC&HS    aspirin chewable tablet 81 mg  81 mg Oral DAILY    [START ON 11/27/2022] cloNIDine (CATAPRES) 0.1 mg/24 hr patch 1 Patch  1 Patch TransDERmal Q7D    sodium chloride (NS) flush 5-40 mL  5-40 mL IntraVENous Q8H    sodium chloride (NS) flush 5-40 mL  5-40 mL IntraVENous PRN    acetaminophen (TYLENOL) tablet 650 mg  650 mg Oral Q6H PRN    Or    acetaminophen (TYLENOL) suppository 650 mg  650 mg Rectal Q6H PRN    polyethylene glycol (MIRALAX) packet 17 g  17 g Oral DAILY PRN    ondansetron (ZOFRAN ODT) tablet 4 mg  4 mg Oral Q8H PRN    Or    ondansetron (ZOFRAN) injection 4 mg  4 mg IntraVENous Q6H PRN    enoxaparin (LOVENOX) injection 40 mg  40 mg SubCUTAneous DAILY    nitroglycerin (NITROBID) 2 % ointment 1 Inch  1 Inch Topical BID    hydrALAZINE (APRESOLINE) 20 mg/mL injection 10 mg  10 mg IntraVENous Q6H PRN     ______________________________________________________________________  EXPECTED LENGTH OF STAY: - - -  ACTUAL LENGTH OF STAY:          0                 Renard Lerner MD

## 2022-11-18 NOTE — ROUTINE PROCESS
Larry Garcia TRANSFER - IN REPORT:    Verbal report received from TOMY Dorado(name) on Melissa Senior  being received from ED(unit) for routine progression of care      Report consisted of patients Situation, Background, Assessment and   Recommendations(SBAR). Information from the following report(s) SBAR, ED Summary, Intake/Output, MAR, Recent Results, Med Rec Status, and Quality Measures was reviewed with the receiving nurse. Opportunity for questions and clarification was provided. Assessment completed upon patients arrival to unit and care assumed. Received care of pt to ICU #5. Pt A&Ox3 and is ambulatory with a cane, that is with her. Lungs clear in all lobes and bowel sounds present in all quadrants. /71. Will re check for accuracy. Pt denies chest pain at this time. Nitro patch to chest from ED. Pt does state she still has a small burning sensation mid sternum. Mylanta was given in ED. Pt oriented to room and call bell system. 0100  Pt OOB to the [facility] with a one person assist to urinate. Pt declined chest pain. CBWR.     1897  Lab in to draw blood for AM labs. Pt tolerated well. Pt is bradycardic on telemetry with an occasional PVC. Pt stated she is being followed by Cardiology and was suppose to speak with someone about getting a Pacemaker in the past.     0645   Bedside shift change report given to KEDAR Goodson (oncoming nurse) by SILKE Simms RN (offgoing nurse). Report included the following information SBAR, ED Summary, Intake/Output, MAR, Recent Results, and Quality Measures.

## 2022-11-18 NOTE — ED NOTES
Pt reports some relief of heartburn like s/s s/p medication. VSS, respiration regular and unlabored.

## 2022-11-18 NOTE — ASSESSMENT & PLAN NOTE
Is a chronic problem  Hold oral hypoglycemics at this time  Continue Lantus  Sliding scale AC at bedtime  Diabetic diet

## 2022-11-18 NOTE — PROGRESS NOTES
Problem: Patient Education: Go to Patient Education Activity  Goal: Patient/Family Education  Outcome: Progressing Towards Goal     Problem: Unstable angina/NSTEMI: Day of Admission/Day 1  Goal: Off Pathway (Use only if patient is Off Pathway)  Outcome: Progressing Towards Goal  Goal: Activity/Safety  Outcome: Progressing Towards Goal  Goal: Consults, if ordered  Outcome: Progressing Towards Goal  Goal: Diagnostic Test/Procedures  Outcome: Progressing Towards Goal  Goal: Nutrition/Diet  Outcome: Progressing Towards Goal  Goal: Discharge Planning  Outcome: Progressing Towards Goal  Goal: Medications  Outcome: Progressing Towards Goal  Goal: Respiratory  Outcome: Progressing Towards Goal  Goal: Treatments/Interventions/Procedures  Outcome: Progressing Towards Goal  Goal: Psychosocial  Outcome: Progressing Towards Goal  Goal: *Hemodynamically stable  Outcome: Progressing Towards Goal  Goal: *Optimal pain control at patient's stated goal  Outcome: Progressing Towards Goal  Goal: *Lungs clear or at baseline  Outcome: Progressing Towards Goal     Problem: Unstable angina/NSTEMI: Day 2  Goal: Off Pathway (Use only if patient is Off Pathway)  Outcome: Progressing Towards Goal  Goal: Activity/Safety  Outcome: Progressing Towards Goal  Goal: Consults, if ordered  Outcome: Progressing Towards Goal  Goal: Diagnostic Test/Procedures  Outcome: Progressing Towards Goal  Goal: Nutrition/Diet  Outcome: Progressing Towards Goal  Goal: Discharge Planning  Outcome: Progressing Towards Goal  Goal: Medications  Outcome: Progressing Towards Goal  Goal: Respiratory  Outcome: Progressing Towards Goal  Goal: Treatments/Interventions/Procedures  Outcome: Progressing Towards Goal  Goal: Psychosocial  Outcome: Progressing Towards Goal  Goal: *Hemodynamically stable  Outcome: Progressing Towards Goal  Goal: *Optimal pain control at patient's stated goal  Outcome: Progressing Towards Goal  Goal: *Lungs clear or at baseline  Outcome: Progressing Towards Goal     Problem: Unstable Angina/NSTEMI: Discharge Outcomes  Goal: *Hemodynamically stable  Outcome: Progressing Towards Goal  Goal: *Stable cardiac rhythm  Outcome: Progressing Towards Goal  Goal: *Lungs clear or at baseline  Outcome: Progressing Towards Goal  Goal: *Optimal pain control at patient's stated goal  Outcome: Progressing Towards Goal  Goal: *Identifies cardiac risk factors  Outcome: Progressing Towards Goal  Goal: *Verbalizes home exercise program, activity guidelines, cardiac precautions  Outcome: Progressing Towards Goal  Goal: *Verbalizes understanding and describes prescribed diet  Outcome: Progressing Towards Goal  Goal: *Verbalizes name, dosage, time, side effects, and number of days to continue medications  Outcome: Progressing Towards Goal  Goal: *Anxiety reduced or absent  Outcome: Progressing Towards Goal  Goal: *Understands and describes signs and symptoms to report to providers(Stroke Metric)  Outcome: Progressing Towards Goal  Goal: *Describes follow-up/return visits to physicians  Outcome: Progressing Towards Goal  Goal: *Describes available resources and support systems  Outcome: Progressing Towards Goal  Goal: *Influenza immunization  Outcome: Progressing Towards Goal  Goal: *Pneumococcal immunization  Outcome: Progressing Towards Goal  Goal: *Describes smoking cessation resources  Outcome: Progressing Towards Goal     Problem: Falls - Risk of  Goal: *Absence of Falls  Description: Document Ronak Fall Risk and appropriate interventions in the flowsheet.   Outcome: Progressing Towards Goal  Note: Fall Risk Interventions:  Mobility Interventions: Patient to call before getting OOB         Medication Interventions: Patient to call before getting OOB, Teach patient to arise slowly    Elimination Interventions: Call light in reach, Patient to call for help with toileting needs              Problem: Patient Education: Go to Patient Education Activity  Goal: Patient/Family Education  Outcome: Progressing Towards Goal

## 2022-11-18 NOTE — ED NOTES
TRANSFER - OUT REPORT:    Verbal report given to Gumaro Kemp on [employer]  being transferred to ICU for routine progression of care       Report consisted of patients Situation, Background, Assessment and   Recommendations(SBAR). Information from the following report(s) SBAR was reviewed with the receiving nurse. Lines:   Peripheral IV 11/17/22 Left Antecubital (Active)        Opportunity for questions and clarification was provided.       Patient transported with:   Monitor  Registered Nurse

## 2022-11-18 NOTE — ED PROVIDER NOTES
This is an 82-year-old female who comes emergency room via EMS with a least 2 days of off-and-on chest pain. Patient states her chest pain has been going on since last week. Patient was also complaining of right leg pain and swelling. She states that she had an ultrasound of her lower extremity last week which was negative for DVT. Patient states that the intermittent chest pain is more heaviness. Patient states that it did go to her jaw. Patient denies any diaphoresis. Patient states that she does have burning into her chest as well. Patient states that she takes an aspirin daily. Patient was given 4 baby aspirin's and 1 sublingual nitro by EMS. Patient states that her pain did come down. Patient denies any abdominal pain. The history is provided by the patient and a relative. Chest Pain (Angina)   This is a new problem. The current episode started more than 2 days ago. The problem occurs daily. The pain is associated with normal activity. The pain is at a severity of 4/10. The pain is mild. The quality of the pain is described as pressure-like. The pain radiates to the right jaw. Associated symptoms include nausea. Pertinent negatives include no abdominal pain, no back pain, no cough, no diaphoresis, no dizziness, no fever, no irregular heartbeat, no numbness, no palpitations, no shortness of breath, no vomiting and no weakness. She has tried nothing for the symptoms. Risk factors include diabetes mellitus, hypertension and dyslipidemia. Her past medical history is significant for DM and HTN. Her past medical history does not include DVT or PE. Pertinent negatives include no cardiac catheterization and no cardiac stents. Past Medical History:   Diagnosis Date    Diabetes ([address]     High cholesterol     Hypertension        Past Surgical History:   Procedure Laterality Date    HX CHOLECYSTECTOMY      HX GYN      HX HYSTERECTOMY      HX THORACOTOMY           History reviewed.  No pertinent family history. Social History     Socioeconomic History    Marital status:      Spouse name: Not on file    Number of children: Not on file    Years of education: Not on file    Highest education level: Not on file   Occupational History    Not on file   Tobacco Use    Smoking status: Never    Smokeless tobacco: Never   Substance and Sexual Activity    Alcohol use: Not Currently    Drug use: Not Currently    Sexual activity: Not on file   Other Topics Concern    Not on file   Social History Narrative    Not on file     Social Determinants of Health     Financial Resource Strain: Not on file   Food Insecurity: Not on file   Transportation Needs: Not on file   Physical Activity: Not on file   Stress: Not on file   Social Connections: Not on file   Intimate Partner Violence: Not on file   Housing Stability: Not on file     ALLERGIES: Demerol [meperidine]    Review of Systems   Constitutional:  Negative for appetite change, chills, diaphoresis, fever and unexpected weight change. HENT:  Negative for ear pain, hearing loss, rhinorrhea and trouble swallowing. Eyes:  Negative for pain and visual disturbance. Respiratory:  Negative for cough, chest tightness and shortness of breath. Cardiovascular:  Positive for chest pain. Negative for palpitations. Gastrointestinal:  Positive for nausea. Negative for abdominal distention, abdominal pain, blood in stool and vomiting. Genitourinary:  Negative for dysuria, hematuria and urgency. Musculoskeletal:  Negative for back pain and myalgias. Skin:  Negative for rash. Neurological:  Negative for dizziness, syncope, weakness and numbness. Psychiatric/Behavioral:  Negative for confusion and suicidal ideas. All other systems reviewed and are negative.     Vitals:    11/17/22 1848 11/17/22 1929 11/17/22 2049   BP: (!) 158/70 (!) 156/69 (!) 150/81   Pulse: 68 61 (!) 59   Resp: 16 18 18   Temp: 99.2 °F (37.3 °C)     SpO2: 97% 96% 97%   Weight: 69.4 kg (153 lb) Height: 5' 1\" (1.549 m)              Physical Exam  Vitals and nursing note reviewed. Constitutional:       General: She is not in acute distress. Appearance: She is well-developed. She is not ill-appearing or diaphoretic. HENT:      Head: Normocephalic and atraumatic. Right Ear: External ear normal.      Left Ear: External ear normal.      Nose: Nose normal.   Eyes:      General: No scleral icterus. Right eye: No discharge. Left eye: No discharge. Conjunctiva/sclera: Conjunctivae normal.      Pupils: Pupils are equal, round, and reactive to light. Neck:      Vascular: No JVD. Trachea: No tracheal deviation. Cardiovascular:      Rate and Rhythm: Normal rate and regular rhythm. Heart sounds: Normal heart sounds. No murmur heard. No friction rub. No gallop. Pulmonary:      Effort: Pulmonary effort is normal. No respiratory distress. Breath sounds: Normal breath sounds. No stridor. No decreased breath sounds, wheezing, rhonchi or rales. Chest:      Chest wall: No tenderness. Abdominal:      General: Bowel sounds are normal. There is no distension. Palpations: Abdomen is soft. Tenderness: There is abdominal tenderness (Minimal) in the epigastric area. There is no guarding or rebound. Negative signs include De La O's sign, Rovsing's sign and McBurney's sign. Musculoskeletal:         General: No tenderness. Normal range of motion. Cervical back: Normal range of motion and neck supple. Right lower leg: No edema. Left lower leg: No edema. Skin:     General: Skin is warm and dry. Capillary Refill: Capillary refill takes less than 2 seconds. Coloration: Skin is not pale. Findings: No erythema or rash. Neurological:      General: No focal deficit present. Mental Status: She is alert and oriented to person, place, and time. GCS: GCS eye subscore is 4. GCS verbal subscore is 5. GCS motor subscore is 6. Cranial Nerves: No cranial nerve deficit. Sensory: No sensory deficit. Motor: No weakness or abnormal muscle tone. Coordination: Coordination normal.      Deep Tendon Reflexes: Reflexes are normal and symmetric. Reflexes normal.   Psychiatric:         Mood and Affect: Mood normal.         Behavior: Behavior normal.         Thought Content: Thought content normal.         Judgment: Judgment normal.        MDM     Amount and/or Complexity of Data Reviewed  Clinical lab tests: ordered and reviewed  Tests in the radiology section of CPT®: ordered and reviewed  Tests in the medicine section of CPT®: ordered and reviewed  Discuss the patient with other providers: yes (Hospitalist)  Independent visualization of images, tracings, or specimens: yes (EKG)    Risk of Complications, Morbidity, and/or Mortality  Presenting problems: moderate  Diagnostic procedures: moderate  Management options: moderate    Patient Progress  Patient progress: stable       Procedures    Chief Complaint   Patient presents with    Chest Pain       The patient's presenting problems have been discussed, and they are in agreement with the care plan formulated and outlined with them. I have encouraged them to ask questions as they arise throughout their visit.     MEDICATIONS GIVEN:  Medications   nitroglycerin (NITROBID) 2 % ointment 0.5 Inch (0.5 Inches Topical Given 11/17/22 1930)   alum-mag hydroxide-simeth (MYLANTA) oral suspension 30 mL (30 mL Oral Given 11/17/22 2044)       LABS REVIEWED:  Recent Results (from the past 24 hour(s))   EKG, 12 LEAD, INITIAL    Collection Time: 11/17/22  6:46 PM   Result Value Ref Range    Ventricular Rate 72 BPM    Atrial Rate 72 BPM    P-R Interval 190 ms    QRS Duration 80 ms    Q-T Interval 393 ms    QTC Calculation (Bezet) 431 ms    Calculated P Axis -26 degrees    Calculated R Axis 12 degrees    Calculated T Axis 29 degrees    Diagnosis Sinus rhythm    CBC WITH AUTOMATED DIFF    Collection Time: 11/17/22  6:58 PM   Result Value Ref Range    WBC 10.9 4.6 - 13.2 K/uL    RBC 4.76 4.20 - 5.30 M/uL    HGB 15.3 12.0 - 16.0 g/dL    HCT 44.9 35.0 - 45.0 %    MCV 94.3 78.0 - 100.0 FL    MCH 32.1 24.0 - 34.0 PG    MCHC 34.1 31.0 - 37.0 g/dL    RDW 13.4 11.6 - 14.5 %    PLATELET 208 (L) 867 - 420 K/uL    MPV 12.3 (H) 9.2 - 11.8 FL    NRBC 0.0 0.0  WBC    ABSOLUTE NRBC 0.00 0.00 - 0.01 K/uL    NEUTROPHILS 65 40 - 73 %    LYMPHOCYTES 23 21 - 52 %    MONOCYTES 9 3 - 10 %    EOSINOPHILS 1 0 - 5 %    BASOPHILS 1 0 - 2 %    IMMATURE GRANULOCYTES 1 (H) 0 - 0.5 %    ABS. NEUTROPHILS 7.1 1.8 - 8.0 K/UL    ABS. LYMPHOCYTES 2.5 0.9 - 3.6 K/UL    ABS. MONOCYTES 1.0 0.05 - 1.2 K/UL    ABS. EOSINOPHILS 0.1 0.0 - 0.4 K/UL    ABS. BASOPHILS 0.1 0.0 - 0.1 K/UL    ABS. IMM. GRANS. 0.1 (H) 0.00 - 0.04 K/UL    DF AUTOMATED     METABOLIC PANEL, COMPREHENSIVE    Collection Time: 11/17/22  6:58 PM   Result Value Ref Range    Sodium 138 136 - 145 mmol/L    Potassium 3.6 3.5 - 5.5 mmol/L    Chloride 101 100 - 111 mmol/L    CO2 31 21 - 32 mmol/L    Anion gap 6 3.0 - 18.0 mmol/L    Glucose 158 (H) 74 - 99 mg/dL    BUN 26 (H) 7 - 18 mg/dL    Creatinine 1.17 0.60 - 1.30 mg/dL    BUN/Creatinine ratio 22 (H) 12 - 20      eGFR 46 (L) >60 ml/min/1.73m2    Calcium 10.2 (H) 8.5 - 10.1 mg/dL    Bilirubin, total 0.6 0.2 - 1.0 mg/dL    AST (SGOT) 26 10 - 38 U/L    ALT (SGPT) 38 13 - 56 U/L    Alk.  phosphatase 68 45 - 117 U/L    Protein, total 6.9 6.4 - 8.2 g/dL    Albumin 3.3 (L) 3.4 - 5.0 g/dL    Globulin 3.6 2.0 - 4.0 g/dL    A-G Ratio 0.9 0.8 - 1.7     NT-PRO BNP    Collection Time: 11/17/22  6:58 PM   Result Value Ref Range    NT pro-BNP 94 0 - 1,800 pg/mL   TROPONIN-HIGH SENSITIVITY    Collection Time: 11/17/22  6:58 PM   Result Value Ref Range    Troponin-High Sensitivity 56 (H) 0 - 54 ng/L   LIPASE    Collection Time: 11/17/22  6:58 PM   Result Value Ref Range    Lipase 132 73 - 393 U/L   TROPONIN-HIGH SENSITIVITY    Collection Time: 11/17/22  9:04 PM   Result Value Ref Range    Troponin-High Sensitivity 58 (H) 0 - 54 ng/L       VITAL SIGNS:  Patient Vitals for the past 24 hrs:   Temp Pulse Resp BP SpO2   11/17/22 2049 -- (!) 59 18 (!) 150/81 97 %   11/17/22 1929 -- 61 18 (!) 156/69 96 %   11/17/22 1848 99.2 °F (37.3 °C) 68 16 (!) 158/70 97 %       RADIOLOGY RESULTS:  The following have been ordered and reviewed:  XR CHEST PA LAT    Result Date: 11/17/2022  EXAM: Two-view chest CLINICAL HISTORY: chest pain , COMPARISON: None FINDINGS: Frontal and lateral views of the chest demonstrate clear lungs. Cardiac silhouette is normal in size and contour. No acute bony or soft tissue abnormality. No acute pulmonary process identified. ED EKG interpretation:  Rhythm: normal sinus rhythm; and regular . Rate (approx.): 72; Axis: normal; P wave: normal; QRS interval: normal ; ST/T wave: normal; Other findings: normal. This EKG was interpreted by Irene Epstein DO, ED Provider. CONSULTATIONS:   CONSULT NOTE:  10:07 PM Irene Epstein DO   spoke with Jane Mario NP, Consult for Hospitalist.  Discussed available diagnostic tests and clinical findings. She is in agreement with care plans as outlined. Jane Mario NP will see and admit pt to the hospitalist service for further evaluation and treatment. PROGRESS NOTES:  Discussed results and plan with patient and daughter. Patient's [place] was slightly elevated above normal cutoff. This remained stable. Given the patient's history of hyperlipidemia, hypertension, diabetes, the patient will be admitted to the services of the hospitalist for further evaluation and treatment. 10:09 PM    HEART Score    History Highly suspicious []2    Moderately suspicious [x]1    Slightly suspicious []0   EKG Significant ST-deviation []2    Non specific repol/LBBB []1    Normal [x]0   Age ? 74 [x]2    Between 39 and 72 []1    ?39 []0   Risk Factors*  High Chol Smoking   HTN +FH   DM Obesity    ? 3 risk factors OR known CAD [x]2    1 or 2 risk factors []1    0 risk factors known []0   Troponin ? 3x normal limit []2    Between 1 and 3x normal limit [x]1    ? normal limit []0    TOTAL SCORE 6     Scores 0-3: 0.9-1.7% risk of adverse cardiac event. In the HEART Score study, these patients were discharged (0.99% in the retrospective study, 1.7% in the prospective study)    Scores 4-6: 12-16.6% risk of adverse cardiac event. In the HEART Score study, these patients were admitted to the hospital. (11.6% retrospective, 16.6% prospective)    Scores ? 7: 50-65% risk of adverse cardiac event. In the HEART Score study, these patients were candidates for early invasive measures. (65.2% retrospective, 50.1% prospective)    A MACE (Major Adverse Cardiac Event) was defined as all-cause mortality, myocardial infarction, or coronary revascularization. DIAGNOSIS:    1. Chest pain, unspecified type        PLAN:  Patient will be observed overnight for further evaluation and treatment of her chest pain. ED COURSE: The patient's hospital course has been uncomplicated. Please note that this dictation was completed with [employer], the computer voice recognition software. Quite often unanticipated grammatical, syntax, homophones, and other interpretive errors are inadvertently transcribed by the computer software. Please disregard these errors. Please excuse any errors that have escaped final proofreading.

## 2022-11-18 NOTE — ROUTINE PROCESS
0700 Bedside shift change report given to KEDAR Rodriguez RN (oncoming nurse) by SILKE Simms RN (offgoing nurse). Report included the following information SBAR, Kardex, Intake/Output, MAR, Accordion, Recent Results, Med Rec Status, and Cardiac Rhythm SB .     1145 Pt sitting in bed eating lunch. Pt shows no signs of distress and has no c/o pain at this time. CBWR, 2 side rails up and bed locked in lowest position. 1915 Bedside shift change report given to ERENDIRA Bradley RN (oncoming nurse) by KEDAR Rodriguez RN (offgoing nurse).  Report included the following information SBAR, Kardex, Intake/Output, MAR, Accordion, Recent Results, Med Rec Status, and Cardiac Rhythm SB .

## 2022-11-19 LAB
ANION GAP SERPL CALC-SCNC: 8 MMOL/L (ref 3–18)
BASOPHILS # BLD: 0.1 K/UL (ref 0–0.1)
BASOPHILS NFR BLD: 1 % (ref 0–2)
BUN SERPL-MCNC: 19 MG/DL (ref 7–18)
BUN/CREAT SERPL: 19 (ref 12–20)
CA-I BLD-MCNC: 10 MG/DL (ref 8.5–10.1)
CHLORIDE SERPL-SCNC: 102 MMOL/L (ref 100–111)
CO2 SERPL-SCNC: 31 MMOL/L (ref 21–32)
CREAT SERPL-MCNC: 0.99 MG/DL (ref 0.6–1.3)
DIFFERENTIAL METHOD BLD: ABNORMAL
EOSINOPHIL # BLD: 0.1 K/UL (ref 0–0.4)
EOSINOPHIL NFR BLD: 2 % (ref 0–5)
ERYTHROCYTE [DISTWIDTH] IN BLOOD BY AUTOMATED COUNT: 13.5 % (ref 11.6–14.5)
GLUCOSE BLD STRIP.AUTO-MCNC: 134 MG/DL (ref 70–110)
GLUCOSE BLD STRIP.AUTO-MCNC: 150 MG/DL (ref 70–110)
GLUCOSE BLD STRIP.AUTO-MCNC: 202 MG/DL (ref 70–110)
GLUCOSE SERPL-MCNC: 118 MG/DL (ref 74–99)
HCT VFR BLD AUTO: 40.7 % (ref 35–45)
HGB BLD-MCNC: 13.4 G/DL (ref 12–16)
IMM GRANULOCYTES # BLD AUTO: 0 K/UL (ref 0–0.04)
IMM GRANULOCYTES NFR BLD AUTO: 0 % (ref 0–0.5)
LYMPHOCYTES # BLD: 2.9 K/UL (ref 0.9–3.6)
LYMPHOCYTES NFR BLD: 40 % (ref 21–52)
MAGNESIUM SERPL-MCNC: 1.8 MG/DL (ref 1.6–2.6)
MCH RBC QN AUTO: 31.1 PG (ref 24–34)
MCHC RBC AUTO-ENTMCNC: 32.9 G/DL (ref 31–37)
MCV RBC AUTO: 94.4 FL (ref 78–100)
MONOCYTES # BLD: 0.8 K/UL (ref 0.05–1.2)
MONOCYTES NFR BLD: 11 % (ref 3–10)
NEUTS SEG # BLD: 3.4 K/UL (ref 1.8–8)
NEUTS SEG NFR BLD: 46 % (ref 40–73)
NRBC # BLD: 0 K/UL (ref 0–0.01)
NRBC BLD-RTO: 0 PER 100 WBC
PERFORMED BY, TECHID: ABNORMAL
PHOSPHATE SERPL-MCNC: 2.2 MG/DL (ref 2.5–4.9)
PLATELET # BLD AUTO: 111 K/UL (ref 135–420)
PMV BLD AUTO: 12.6 FL (ref 9.2–11.8)
POTASSIUM SERPL-SCNC: 2.9 MMOL/L (ref 3.5–5.5)
RBC # BLD AUTO: 4.31 M/UL (ref 4.2–5.3)
SODIUM SERPL-SCNC: 141 MMOL/L (ref 136–145)
WBC # BLD AUTO: 7.4 K/UL (ref 4.6–13.2)

## 2022-11-19 PROCEDURE — 74011250636 HC RX REV CODE- 250/636: Performed by: NURSE PRACTITIONER

## 2022-11-19 PROCEDURE — 96376 TX/PRO/DX INJ SAME DRUG ADON: CPT

## 2022-11-19 PROCEDURE — 96374 THER/PROPH/DIAG INJ IV PUSH: CPT

## 2022-11-19 PROCEDURE — 80048 BASIC METABOLIC PNL TOTAL CA: CPT

## 2022-11-19 PROCEDURE — 74011636637 HC RX REV CODE- 636/637: Performed by: NURSE PRACTITIONER

## 2022-11-19 PROCEDURE — G0378 HOSPITAL OBSERVATION PER HR: HCPCS

## 2022-11-19 PROCEDURE — 74011000250 HC RX REV CODE- 250: Performed by: NURSE PRACTITIONER

## 2022-11-19 PROCEDURE — 82962 GLUCOSE BLOOD TEST: CPT

## 2022-11-19 PROCEDURE — 74011250637 HC RX REV CODE- 250/637: Performed by: NURSE PRACTITIONER

## 2022-11-19 PROCEDURE — 83735 ASSAY OF MAGNESIUM: CPT

## 2022-11-19 PROCEDURE — 65270000029 HC RM PRIVATE

## 2022-11-19 PROCEDURE — 74011250637 HC RX REV CODE- 250/637: Performed by: INTERNAL MEDICINE

## 2022-11-19 PROCEDURE — 85025 COMPLETE CBC W/AUTO DIFF WBC: CPT

## 2022-11-19 PROCEDURE — 36415 COLL VENOUS BLD VENIPUNCTURE: CPT

## 2022-11-19 PROCEDURE — 84100 ASSAY OF PHOSPHORUS: CPT

## 2022-11-19 RX ORDER — POTASSIUM CHLORIDE 750 MG/1
40 TABLET, EXTENDED RELEASE ORAL
Status: COMPLETED | OUTPATIENT
Start: 2022-11-19 | End: 2022-11-19

## 2022-11-19 RX ORDER — POTASSIUM CHLORIDE 750 MG/1
40 TABLET, EXTENDED RELEASE ORAL ONCE
Status: COMPLETED | OUTPATIENT
Start: 2022-11-19 | End: 2022-11-19

## 2022-11-19 RX ORDER — POTASSIUM CHLORIDE 7.45 MG/ML
10 INJECTION INTRAVENOUS
Status: COMPLETED | OUTPATIENT
Start: 2022-11-19 | End: 2022-11-19

## 2022-11-19 RX ADMIN — SODIUM CHLORIDE, PRESERVATIVE FREE 10 ML: 5 INJECTION INTRAVENOUS at 21:32

## 2022-11-19 RX ADMIN — ENOXAPARIN SODIUM 40 MG: 100 INJECTION SUBCUTANEOUS at 11:02

## 2022-11-19 RX ADMIN — POTASSIUM CHLORIDE 40 MEQ: 750 TABLET, EXTENDED RELEASE ORAL at 21:30

## 2022-11-19 RX ADMIN — SODIUM CHLORIDE, PRESERVATIVE FREE 10 ML: 5 INJECTION INTRAVENOUS at 16:22

## 2022-11-19 RX ADMIN — ATORVASTATIN CALCIUM 40 MG: 40 TABLET, FILM COATED ORAL at 10:08

## 2022-11-19 RX ADMIN — POTASSIUM CHLORIDE 10 MEQ: 7.46 INJECTION, SOLUTION INTRAVENOUS at 06:16

## 2022-11-19 RX ADMIN — POTASSIUM CHLORIDE 10 MEQ: 7.46 INJECTION, SOLUTION INTRAVENOUS at 07:13

## 2022-11-19 RX ADMIN — POTASSIUM CHLORIDE 40 MEQ: 750 TABLET, EXTENDED RELEASE ORAL at 10:06

## 2022-11-19 RX ADMIN — FAMOTIDINE 10 MG: 20 TABLET, FILM COATED ORAL at 17:25

## 2022-11-19 RX ADMIN — INSULIN GLARGINE 15 UNITS: 100 INJECTION, SOLUTION SUBCUTANEOUS at 21:29

## 2022-11-19 RX ADMIN — HYDROCHLOROTHIAZIDE 25 MG: 25 TABLET ORAL at 10:09

## 2022-11-19 RX ADMIN — INSULIN LISPRO 4 UNITS: 100 INJECTION, SOLUTION INTRAVENOUS; SUBCUTANEOUS at 16:12

## 2022-11-19 RX ADMIN — AMLODIPINE BESYLATE 5 MG: 5 TABLET ORAL at 10:10

## 2022-11-19 RX ADMIN — ASPIRIN 81 MG 81 MG: 81 TABLET ORAL at 10:09

## 2022-11-19 RX ADMIN — ALUMINA, MAGNESIA, AND SIMETHICONE ORAL SUSPENSION REGULAR STRENGTH 30 ML: 1200; 1200; 120 SUSPENSION ORAL at 22:43

## 2022-11-19 RX ADMIN — ATENOLOL 50 MG: 25 TABLET ORAL at 10:10

## 2022-11-19 RX ADMIN — SODIUM CHLORIDE, PRESERVATIVE FREE 10 ML: 5 INJECTION INTRAVENOUS at 05:27

## 2022-11-19 RX ADMIN — FAMOTIDINE 10 MG: 20 TABLET, FILM COATED ORAL at 10:10

## 2022-11-19 RX ADMIN — POTASSIUM CHLORIDE 10 MEQ: 7.46 INJECTION, SOLUTION INTRAVENOUS at 05:14

## 2022-11-19 RX ADMIN — NITROGLYCERIN 1 INCH: 20 OINTMENT TOPICAL at 17:25

## 2022-11-19 RX ADMIN — NITROGLYCERIN 1 INCH: 20 OINTMENT TOPICAL at 10:10

## 2022-11-19 NOTE — PROGRESS NOTES
1900  Bedside shift change report given to ERENDIRA Rosario RN (oncoming nurse) by KEDAR Hayden RN BSN (offgoing nurse). Report included the following information SBAR, Kardex, Intake/Output, MAR, Recent Results, Med Rec Status, and Cardiac Rhythm NSR .       1930  PM assessment done. Pt aaox3. Skin w/d. Resp easy and nonlabored. Pt voices no c/o pain or discomfort. .     2000  Pt  up ambulating to bathroom to void. 0000  Pt resting quietly with eyes closed. Resp easy and nonlabored. No distress noted. CBWR. Pt aroused for vital signs. Pt voices no complaints. 0400  Rounding complete at this time. Pt. Resting quietly with call bell in reach. VSS. Blood pressure (!) 138/56, pulse (!) 52, temperature 97.9 °F (36.6 °C), resp. rate 18, height 5' 1\" (1.549 m), weight 68.9 kg (152 lb), SpO2 95 %.     0700  Bedside shift report given to oncoming nurse.

## 2022-11-19 NOTE — ROUTINE PROCESS
0700 Bedside shift change report given to KEDAR Rodriguez RN (oncoming nurse) by Vidya Bradley RN (offgoing nurse). Report included the following information SBAR, Kardex, Intake/Output, MAR, Accordion, Recent Results, Med Rec Status, and Cardiac Rhythm SB .     1145 Pt sitting in bed eating lunch. Pt shows no signs of distress and has no c/o pain at this time. CBWR, 2 side rails up and bed locked in lowest position. 1635 Pt sitting in bed eating dinner. Pt shows no signs of distress and has no c/o pain at this time. CBWR, 2 side rails up and bed locked in lowest positon. 1800 Report given to EUGENIO Benitez.

## 2022-11-19 NOTE — PROGRESS NOTES
1845- Pt received from ICU. Alert and oriented. Oriented to room. CBWR. Report received from Peterson.

## 2022-11-19 NOTE — PROGRESS NOTES
Hospitalist Progress Note             Date of Service:  2022  NAME:  Jovana Salter  :  1935  MRN:  [id number]    Admission Summary:   Jovana Salter is a 82 y.o. female  has a past medical history of Diabetes ([address] High cholesterol, and Hypertension. Patient seen at bedside. Patient came to emergency room tonight with chest pain to her epigastric area that she explained as a pressure and it did radiate to her jaw and she has had it for a week. Patient states she has had the pain on and off for a week she has not taken anything for the pain or called her primary care doctor. Patient states she thought it was gas and was trying to belch. At this point in time patient states she is pain-free except for on exam she was found tender in the epigastric area. Patient will be admitted to the hospitalist group on observation no echo is found in the history I will order an echocardiogram cardiology consult serial labs and troponins.       Assessment & Plan:         Chest pain  Chest pain is more epigastric  Patient was trying to belch Maalox helped in the ED  Cardiology consult  Troponins slightly positive we will repeat  Echocardiogram ordered  Aspirin daily  Nitroglycerin Q 12     Diabetes mellitus type 2, controlled ([address]  Is a chronic problem  Hold oral hypoglycemics at this time  Continue Lantus  Sliding scale AC at bedtime  Diabetic diet     Hypertension  This is a chronic problem  Continue hydrochlorothiazide, Tenormin, Norvasc        Pt still having chest tightness, I suspect this may be GI, cardiology recommends inpt stress testing on Monday since her symptoms persist  Will also start some gi cocktail    Still having chest symptoms  Some relief from maalox  Plan on stress test Monday am      Hospital Problems  Never Reviewed            Codes Class Noted POA    Hypertension ICD-10-CM: I10  ICD-9-CM: 401.9 11/18/2022 Unknown        Diabetes mellitus type 2, controlled ([facility] ICD-10-CM: E11.9  ICD-9-CM: 250.00  11/18/2022 Unknown        Chest pain ICD-10-CM: R07.9  ICD-9-CM: 786.50  11/17/2022 Unknown             Review of Systems:   A comprehensive review of systems was negative except for that written in the HPI. Vital Signs:    Last 24hrs VS reviewed since prior progress note. Most recent are:  Visit Vitals  BP (!) 137/56   Pulse (!) 59   Temp 97.9 °F (36.6 °C)   Resp 18   Ht 5' 1\" (1.549 m)   Wt 68.9 kg (152 lb)   SpO2 95%   BMI 28.72 kg/m²         Intake/Output Summary (Last 24 hours) at 11/19/2022 1053  Last data filed at 11/19/2022 0538  Gross per 24 hour   Intake 700 ml   Output --   Net 700 ml        Physical Examination:             General:          Alert, cooperative, no distress, appears stated age. HEENT:           Atraumatic, anicteric sclerae, pink conjunctivae                          No oral ulcers, mucosa moist, throat clear, dentition fair  Neck:               Supple, symmetrical  Lungs:             Clear to auscultation bilaterally. No Wheezing or Rhonchi. No rales. Chest wall:      No tenderness  No Accessory muscle use. Heart:              Regular  rhythm,  No  murmur   No edema  Abdomen:        Soft, non-tender. Not distended. Bowel sounds normal  Extremities:     No cyanosis. No clubbing,                            Skin turgor normal, Capillary refill normal  Skin:                Not pale. Not Jaundiced  No rashes   Psych:             Not anxious or agitated.   Neurologic:      Alert, moves all extremities, answers questions appropriately and responds to commands        Data Review:    Review and/or order of clinical lab test  Review and/or order of tests in the radiology section of CPT  Review and/or order of tests in the medicine section of CPT      Labs:     Recent Labs     11/19/22  0300 11/18/22  0330   WBC 7.4 9.8   HGB 13.4 13.9   HCT 40.7 41.8   * 122*     Recent Labs     11/19/22  0300 11/18/22  0330 11/17/22 1858    140 138   K 2.9* 3.4* 3.6    102 101   CO2 31 31 31   BUN 19* 22* 26*   CREA 0.99 1.02 1.17   * 175* 158*   CA 10.0 9.7 10.2*   MG 1.8 1.8  --    PHOS 2.2*  --   --      Recent Labs     11/18/22  0330 11/17/22 1858   ALT 30 38   AP 59 68   TBILI 0.9 0.6   TP 6.2* 6.9   ALB 3.1* 3.3*   GLOB 3.1 3.6   LPSE  --  132     Recent Labs     11/17/22 1858   INR 1.0   PTP 14.1      No results for input(s): FE, TIBC, PSAT, FERR in the last 72 hours. No results found for: FOL, RBCF   No results for input(s): PH, PCO2, PO2 in the last 72 hours. No results for input(s): CPK, CKNDX, TROIQ in the last 72 hours.     No lab exists for component: CPKMB  No results found for: CHOL, CHOLX, CHLST, CHOLV, HDL, HDLP, LDL, LDLC, DLDLP, TGLX, TRIGL, TRIGP, CHHD, CHHDX  Lab Results   Component Value Date/Time    Glucose (POC) 209 (H) 11/18/2022 09:43 PM    Glucose (POC) 105 11/18/2022 04:30 PM    Glucose (POC) 245 (H) 11/18/2022 11:17 AM     No results found for: COLOR, APPRN, SPGRU, REFSG, SOSA, PROTU, GLUCU, KETU, BILU, UROU, FRANCHESCA, LEUKU, GLUKE, EPSU, BACTU, WBCU, RBCU, CASTS, UCRY      Medications Reviewed:     Current Facility-Administered Medications   Medication Dose Route Frequency    amLODIPine (NORVASC) tablet 5 mg  5 mg Oral DAILY    atenoloL (TENORMIN) tablet 50 mg  50 mg Oral DAILY    atorvastatin (LIPITOR) tablet 40 mg  40 mg Oral DAILY    hydroCHLOROthiazide (HYDRODIURIL) tablet 25 mg  25 mg Oral DAILY    insulin glargine (LANTUS) injection 15 Units  15 Units SubCUTAneous QHS    insulin lispro (HUMALOG) injection   SubCUTAneous AC&HS    aspirin chewable tablet 81 mg  81 mg Oral DAILY    [START ON 11/27/2022] cloNIDine (CATAPRES) 0.1 mg/24 hr patch 1 Patch  1 Patch TransDERmal Q7D    alum-mag hydroxide-simeth (MYLANTA) oral suspension 30 mL  30 mL Oral Q4H PRN    famotidine (PEPCID) tablet 10 mg  10 mg Oral BID    sodium chloride (NS) flush 5-40 mL 5-40 mL IntraVENous Q8H    sodium chloride (NS) flush 5-40 mL  5-40 mL IntraVENous PRN    acetaminophen (TYLENOL) tablet 650 mg  650 mg Oral Q6H PRN    Or    acetaminophen (TYLENOL) suppository 650 mg  650 mg Rectal Q6H PRN    polyethylene glycol (MIRALAX) packet 17 g  17 g Oral DAILY PRN    ondansetron (ZOFRAN ODT) tablet 4 mg  4 mg Oral Q8H PRN    Or    ondansetron (ZOFRAN) injection 4 mg  4 mg IntraVENous Q6H PRN    enoxaparin (LOVENOX) injection 40 mg  40 mg SubCUTAneous DAILY    nitroglycerin (NITROBID) 2 % ointment 1 Inch  1 Inch Topical BID    hydrALAZINE (APRESOLINE) 20 mg/mL injection 10 mg  10 mg IntraVENous Q6H PRN     ______________________________________________________________________  EXPECTED LENGTH OF STAY: - - -  ACTUAL LENGTH OF STAY:          0                 Ricardo Bose MD

## 2022-11-20 LAB
ANION GAP SERPL CALC-SCNC: 7 MMOL/L (ref 3–18)
BUN SERPL-MCNC: 22 MG/DL (ref 7–18)
BUN/CREAT SERPL: 19 (ref 12–20)
CA-I BLD-MCNC: 10.1 MG/DL (ref 8.5–10.1)
CHLORIDE SERPL-SCNC: 103 MMOL/L (ref 100–111)
CO2 SERPL-SCNC: 29 MMOL/L (ref 21–32)
CREAT SERPL-MCNC: 1.15 MG/DL (ref 0.6–1.3)
GLUCOSE BLD STRIP.AUTO-MCNC: 136 MG/DL (ref 70–110)
GLUCOSE BLD STRIP.AUTO-MCNC: 140 MG/DL (ref 70–110)
GLUCOSE BLD STRIP.AUTO-MCNC: 144 MG/DL (ref 70–110)
GLUCOSE BLD STRIP.AUTO-MCNC: 241 MG/DL (ref 70–110)
GLUCOSE SERPL-MCNC: 151 MG/DL (ref 74–99)
MAGNESIUM SERPL-MCNC: 1.8 MG/DL (ref 1.6–2.6)
PERFORMED BY, TECHID: ABNORMAL
POTASSIUM SERPL-SCNC: 3.6 MMOL/L (ref 3.5–5.5)
SODIUM SERPL-SCNC: 139 MMOL/L (ref 136–145)

## 2022-11-20 PROCEDURE — 65270000029 HC RM PRIVATE

## 2022-11-20 PROCEDURE — 83735 ASSAY OF MAGNESIUM: CPT

## 2022-11-20 PROCEDURE — 36415 COLL VENOUS BLD VENIPUNCTURE: CPT

## 2022-11-20 PROCEDURE — 74011000250 HC RX REV CODE- 250: Performed by: NURSE PRACTITIONER

## 2022-11-20 PROCEDURE — 74011250637 HC RX REV CODE- 250/637: Performed by: NURSE PRACTITIONER

## 2022-11-20 PROCEDURE — 80048 BASIC METABOLIC PNL TOTAL CA: CPT

## 2022-11-20 PROCEDURE — 74011250636 HC RX REV CODE- 250/636: Performed by: NURSE PRACTITIONER

## 2022-11-20 PROCEDURE — 82962 GLUCOSE BLOOD TEST: CPT

## 2022-11-20 PROCEDURE — 74011250637 HC RX REV CODE- 250/637: Performed by: INTERNAL MEDICINE

## 2022-11-20 PROCEDURE — 74011636637 HC RX REV CODE- 636/637: Performed by: NURSE PRACTITIONER

## 2022-11-20 RX ORDER — CLONIDINE 0.1 MG/24H
1 PATCH, EXTENDED RELEASE TRANSDERMAL
Status: DISCONTINUED | OUTPATIENT
Start: 2022-11-20 | End: 2022-11-21 | Stop reason: HOSPADM

## 2022-11-20 RX ORDER — POTASSIUM CHLORIDE 750 MG/1
40 TABLET, EXTENDED RELEASE ORAL
Status: COMPLETED | OUTPATIENT
Start: 2022-11-20 | End: 2022-11-20

## 2022-11-20 RX ADMIN — INSULIN GLARGINE 15 UNITS: 100 INJECTION, SOLUTION SUBCUTANEOUS at 22:29

## 2022-11-20 RX ADMIN — ATENOLOL 50 MG: 25 TABLET ORAL at 09:38

## 2022-11-20 RX ADMIN — ATORVASTATIN CALCIUM 40 MG: 40 TABLET, FILM COATED ORAL at 09:37

## 2022-11-20 RX ADMIN — INSULIN LISPRO 4 UNITS: 100 INJECTION, SOLUTION INTRAVENOUS; SUBCUTANEOUS at 12:33

## 2022-11-20 RX ADMIN — FAMOTIDINE 10 MG: 20 TABLET, FILM COATED ORAL at 09:37

## 2022-11-20 RX ADMIN — SODIUM CHLORIDE, PRESERVATIVE FREE 10 ML: 5 INJECTION INTRAVENOUS at 22:30

## 2022-11-20 RX ADMIN — SODIUM CHLORIDE, PRESERVATIVE FREE 10 ML: 5 INJECTION INTRAVENOUS at 06:02

## 2022-11-20 RX ADMIN — FAMOTIDINE 10 MG: 20 TABLET, FILM COATED ORAL at 17:47

## 2022-11-20 RX ADMIN — ENOXAPARIN SODIUM 40 MG: 100 INJECTION SUBCUTANEOUS at 09:38

## 2022-11-20 RX ADMIN — HYDROCHLOROTHIAZIDE 25 MG: 25 TABLET ORAL at 09:38

## 2022-11-20 RX ADMIN — SODIUM CHLORIDE, PRESERVATIVE FREE 10 ML: 5 INJECTION INTRAVENOUS at 15:07

## 2022-11-20 RX ADMIN — NITROGLYCERIN 1 INCH: 20 OINTMENT TOPICAL at 09:38

## 2022-11-20 RX ADMIN — NITROGLYCERIN 1 INCH: 20 OINTMENT TOPICAL at 17:46

## 2022-11-20 RX ADMIN — ASPIRIN 81 MG 81 MG: 81 TABLET ORAL at 09:37

## 2022-11-20 RX ADMIN — POTASSIUM CHLORIDE 40 MEQ: 750 TABLET, EXTENDED RELEASE ORAL at 09:37

## 2022-11-20 RX ADMIN — AMLODIPINE BESYLATE 5 MG: 5 TABLET ORAL at 09:38

## 2022-11-20 NOTE — DISCHARGE SUMMARY
Hospitalist Progress Note             Date of Service:  2022  NAME:  Bob Mason  :  1935  MRN:  [id number]    Admission Summary:   Bob Mason is a 82 y.o. female  has a past medical history of Diabetes ([address] High cholesterol, and Hypertension. Patient seen at bedside. Patient came to emergency room tonight with chest pain to her epigastric area that she explained as a pressure and it did radiate to her jaw and she has had it for a week. Patient states she has had the pain on and off for a week she has not taken anything for the pain or called her primary care doctor. Patient states she thought it was gas and was trying to belch. At this point in time patient states she is pain-free except for on exam she was found tender in the epigastric area. Patient will be admitted to the hospitalist group on observation no echo is found in the history I will order an echocardiogram cardiology consult serial labs and troponins.       Assessment & Plan:         Chest pain  Chest pain is more epigastric  Patient was trying to belch Maalox helped in the ED  Cardiology consult  Troponins slightly positive we will repeat  Echocardiogram ordered  Aspirin daily  Nitroglycerin Q 12     Diabetes mellitus type 2, controlled ([address]  Is a chronic problem  Hold oral hypoglycemics at this time  Continue Lantus  Sliding scale AC at bedtime  Diabetic diet     Hypertension  This is a chronic problem  Continue hydrochlorothiazide, Tenormin, Norvasc        Pt still having chest tightness, I suspect this may be GI, cardiology recommends inpt stress testing on Monday since her symptoms persist  Will also start some gi cocktail     Still having chest symptoms  Some relief from maalox  Plan on stress test Monday am       Stress test in am  If negative dc home with gi follow up  If positive transfer for [facility]      Hospital Problems  Never Reviewed            Codes Class Noted POA    Hypertension ICD-10-CM: I10  ICD-9-CM: 401.9  11/18/2022 Unknown        Diabetes mellitus type 2, controlled ([facility] ICD-10-CM: E11.9  ICD-9-CM: 250.00  11/18/2022 Unknown        Chest pain ICD-10-CM: R07.9  ICD-9-CM: 786.50  11/17/2022 Unknown             Review of Systems:   A comprehensive review of systems was negative except for that written in the HPI. Still some chest discomfort, maalox seems to help temporariliy      Vital Signs:    Last 24hrs VS reviewed since prior progress note. Most recent are:  Visit Vitals  BP (!) 148/63   Pulse (!) 55   Temp 97.6 °F (36.4 °C)   Resp 16   Ht 5' 1\" (1.549 m)   Wt 68.3 kg (150 lb 9.2 oz)   SpO2 98%   BMI 28.45 kg/m²         Intake/Output Summary (Last 24 hours) at 11/20/2022 1011  Last data filed at 11/19/2022 2244  Gross per 24 hour   Intake 300 ml   Output --   Net 300 ml        Physical Examination:             General:          Alert, cooperative, no distress, appears stated age. HEENT:           Atraumatic, anicteric sclerae, pink conjunctivae                          No oral ulcers, mucosa moist, throat clear, dentition fair  Neck:               Supple, symmetrical  Lungs:             Clear to auscultation bilaterally. No Wheezing or Rhonchi. No rales. Chest wall:      No tenderness  No Accessory muscle use. Heart:              Regular  rhythm,  No  murmur   No edema  Abdomen:        Soft, non-tender. Not distended. Bowel sounds normal  Extremities:     No cyanosis. No clubbing,                            Skin turgor normal, Capillary refill normal  Skin:                Not pale. Not Jaundiced  No rashes   Psych:             Not anxious or agitated.   Neurologic:      Alert, moves all extremities, answers questions appropriately and responds to commands        Data Review:    Review and/or order of clinical lab test  Review and/or order of tests in the radiology section of CPT  Review and/or order of tests in the medicine section of [facility]      Labs:     Recent Labs     11/19/22  0300 11/18/22  0330   WBC 7.4 9.8   HGB 13.4 13.9   HCT 40.7 41.8   * 122*     Recent Labs     11/20/22  0342 11/19/22  0300 11/18/22 0330    141 140   K 3.6 2.9* 3.4*    102 102   CO2 29 31 31   BUN 22* 19* 22*   CREA 1.15 0.99 1.02   * 118* 175*   CA 10.1 10.0 9.7   MG 1.8 1.8 1.8   PHOS  --  2.2*  --      Recent Labs     11/18/22 0330 11/17/22 1858   ALT 30 38   AP 59 68   TBILI 0.9 0.6   TP 6.2* 6.9   ALB 3.1* 3.3*   GLOB 3.1 3.6   LPSE  --  132     Recent Labs     11/17/22 1858   INR 1.0   PTP 14.1      No results for input(s): FE, TIBC, PSAT, FERR in the last 72 hours. No results found for: FOL, RBCF   No results for input(s): PH, PCO2, PO2 in the last 72 hours. No results for input(s): CPK, CKNDX, TROIQ in the last 72 hours.     No lab exists for component: CPKMB  No results found for: CHOL, CHOLX, CHLST, CHOLV, HDL, HDLP, LDL, LDLC, DLDLP, TGLX, TRIGL, TRIGP, CHHD, CHHDX  Lab Results   Component Value Date/Time    Glucose (POC) 136 (H) 11/20/2022 07:26 AM    Glucose (POC) 134 (H) 11/19/2022 08:43 PM    Glucose (POC) 202 (H) 11/19/2022 04:05 PM    Glucose (POC) 150 (H) 11/19/2022 11:36 AM    Glucose (POC) 209 (H) 11/18/2022 09:43 PM     No results found for: COLOR, APPRN, SPGRU, REFSG, SOSA, PROTU, GLUCU, KETU, BILU, UROU, FRANCHESCA, LEUKU, GLUKE, EPSU, BACTU, WBCU, RBCU, CASTS, UCRY      Medications Reviewed:     Current Facility-Administered Medications   Medication Dose Route Frequency    cloNIDine (CATAPRES) 0.1 mg/24 hr patch 1 Patch  1 Patch TransDERmal Q7D    amLODIPine (NORVASC) tablet 5 mg  5 mg Oral DAILY    atenoloL (TENORMIN) tablet 50 mg  50 mg Oral DAILY    atorvastatin (LIPITOR) tablet 40 mg  40 mg Oral DAILY    hydroCHLOROthiazide (HYDRODIURIL) tablet 25 mg  25 mg Oral DAILY    insulin glargine (LANTUS) injection 15 Units  15 Units SubCUTAneous QHS    insulin lispro (HUMALOG) injection   SubCUTAneous AC&HS    aspirin chewable tablet 81 mg  81 mg Oral DAILY    alum-mag hydroxide-simeth (MYLANTA) oral suspension 30 mL  30 mL Oral Q4H PRN    famotidine (PEPCID) tablet 10 mg  10 mg Oral BID    sodium chloride (NS) flush 5-40 mL  5-40 mL IntraVENous Q8H    sodium chloride (NS) flush 5-40 mL  5-40 mL IntraVENous PRN    acetaminophen (TYLENOL) tablet 650 mg  650 mg Oral Q6H PRN    Or    acetaminophen (TYLENOL) suppository 650 mg  650 mg Rectal Q6H PRN    polyethylene glycol (MIRALAX) packet 17 g  17 g Oral DAILY PRN    ondansetron (ZOFRAN ODT) tablet 4 mg  4 mg Oral Q8H PRN    Or    ondansetron (ZOFRAN) injection 4 mg  4 mg IntraVENous Q6H PRN    enoxaparin (LOVENOX) injection 40 mg  40 mg SubCUTAneous DAILY    nitroglycerin (NITROBID) 2 % ointment 1 Inch  1 Inch Topical BID    hydrALAZINE (APRESOLINE) 20 mg/mL injection 10 mg  10 mg IntraVENous Q6H PRN     ______________________________________________________________________  EXPECTED LENGTH OF STAY: - - -  ACTUAL LENGTH OF STAY:          1                 Kristin Paula MD

## 2022-11-20 NOTE — PROGRESS NOTES
Problem: Unstable Angina/NSTEMI: Discharge Outcomes  Goal: *Stable cardiac rhythm  Outcome: Progressing Towards Goal  Goal: *Lungs clear or at baseline  Outcome: Progressing Towards Goal  Goal: *Optimal pain control at patient's stated goal  Outcome: Progressing Towards Goal     Problem: Falls - Risk of  Goal: *Absence of Falls  Description: Document Jann Mcclain Fall Risk and appropriate interventions in the flowsheet.   Outcome: Progressing Towards Goal  Note: Fall Risk Interventions:  Mobility Interventions: Patient to call before getting OOB         Medication Interventions: Patient to call before getting OOB, Teach patient to arise slowly    Elimination Interventions: Call light in reach, Patient to call for help with toileting needs

## 2022-11-20 NOTE — PROGRESS NOTES
1900 - Assumed care of pt, shift report given    1915 - VSS. Assessment completed. Pt a&ox2-3, some confusion noted. Pt up to bathroom and back to bed with cane. Denies any needs at this time. CBWR     2130 - HS medication given, pt tolerated well. Pt asking for assistance calling daughter, phone number dialed from room phone and phone given to pt. CBWR     2145 - Pt very confused, charge nurse at bedside, daughter asking for an update. This nurse called pt's daughter from desk phone and gave update on condition and location, per daughter pt is more confused than usual. Reassessment completed, pt alert to self only at this time. Hospitalist made aware of status change. 2340 - Reassessed pt, alert to self and why she came to the hospital, but forgets where she is when asked \"do you know where you are. \" Resting quietly in bed, easily reoriented. CBWR bed alarm on    0340 - VSS. Pt resting comfortably in bed. Lab in for blood draw.     0606 - IV flushed locked and capped. Pt able to answer orientation questions to year and situation, states she is in the hospital however thought she was in [place]. Easily reoriented. Assisted to bathroom and back to bed. Weight obtained. Pt denies any pain, discomfort or needs at this time.  CBWR

## 2022-11-21 ENCOUNTER — APPOINTMENT (OUTPATIENT)
Dept: NUCLEAR MEDICINE | Age: 87
DRG: 392 | End: 2022-11-21
Attending: INTERNAL MEDICINE
Payer: MEDICARE

## 2022-11-21 ENCOUNTER — APPOINTMENT (OUTPATIENT)
Dept: NON INVASIVE DIAGNOSTICS | Age: 87
DRG: 392 | End: 2022-11-21
Attending: INTERNAL MEDICINE
Payer: MEDICARE

## 2022-11-21 VITALS
HEIGHT: 61 IN | DIASTOLIC BLOOD PRESSURE: 73 MMHG | TEMPERATURE: 97.2 F | BODY MASS INDEX: 28.43 KG/M2 | SYSTOLIC BLOOD PRESSURE: 155 MMHG | OXYGEN SATURATION: 95 % | HEART RATE: 61 BPM | WEIGHT: 150.57 LBS | RESPIRATION RATE: 18 BRPM

## 2022-11-21 LAB
GLUCOSE BLD STRIP.AUTO-MCNC: 133 MG/DL (ref 70–110)
GLUCOSE BLD STRIP.AUTO-MCNC: 169 MG/DL (ref 70–110)
NUC STRESS EJECTION FRACTION: 75 %
PERFORMED BY, TECHID: ABNORMAL
PERFORMED BY, TECHID: ABNORMAL
STRESS BASELINE HR: 60 BPM
STRESS BASELINE ST DEPRESSION: 0 MM
STRESS ESTIMATED WORKLOAD: 1 METS
STRESS EXERCISE DUR MIN: 4 MIN
STRESS EXERCISE DUR SEC: 0 SEC
STRESS PEAK DIAS BP: 88 MMHG
STRESS PEAK SYS BP: 174 MMHG
STRESS PERCENT HR ACHIEVED: 69 %
STRESS POST PEAK HR: 93 BPM
STRESS RATE PRESSURE PRODUCT: NORMAL BPM*MMHG
STRESS ST DEPRESSION: 0 MM
STRESS TARGET HR: 135 BPM

## 2022-11-21 PROCEDURE — 74011636637 HC RX REV CODE- 636/637: Performed by: NURSE PRACTITIONER

## 2022-11-21 PROCEDURE — 74011000250 HC RX REV CODE- 250: Performed by: NURSE PRACTITIONER

## 2022-11-21 PROCEDURE — 93017 CV STRESS TEST TRACING ONLY: CPT

## 2022-11-21 PROCEDURE — A9500 TC99M SESTAMIBI: HCPCS

## 2022-11-21 PROCEDURE — 82962 GLUCOSE BLOOD TEST: CPT

## 2022-11-21 PROCEDURE — 74011250636 HC RX REV CODE- 250/636: Performed by: INTERNAL MEDICINE

## 2022-11-21 RX ORDER — ISOSORBIDE MONONITRATE 30 MG/1
30 TABLET, EXTENDED RELEASE ORAL DAILY
Qty: 30 TABLET | Refills: 0 | Status: SHIPPED | OUTPATIENT
Start: 2022-11-21 | End: 2022-12-21

## 2022-11-21 RX ORDER — TETRAKIS(2-METHOXYISOBUTYLISOCYANIDE)COPPER(I) TETRAFLUOROBORATE 1 MG/ML
8.44 INJECTION, POWDER, LYOPHILIZED, FOR SOLUTION INTRAVENOUS
Status: COMPLETED | OUTPATIENT
Start: 2022-11-21 | End: 2022-11-21

## 2022-11-21 RX ORDER — TETRAKIS(2-METHOXYISOBUTYLISOCYANIDE)COPPER(I) TETRAFLUOROBORATE 1 MG/ML
26.4 INJECTION, POWDER, LYOPHILIZED, FOR SOLUTION INTRAVENOUS
Status: COMPLETED | OUTPATIENT
Start: 2022-11-21 | End: 2022-11-21

## 2022-11-21 RX ADMIN — TETRAKIS(2-METHOXYISOBUTYLISOCYANIDE)COPPER(I) TETRAFLUOROBORATE 8.44 MILLICURIE: 1 INJECTION, POWDER, LYOPHILIZED, FOR SOLUTION INTRAVENOUS at 06:40

## 2022-11-21 RX ADMIN — REGADENOSON 0.4 MG: 0.08 INJECTION, SOLUTION INTRAVENOUS at 08:52

## 2022-11-21 RX ADMIN — TETRAKIS(2-METHOXYISOBUTYLISOCYANIDE)COPPER(I) TETRAFLUOROBORATE 26.4 MILLICURIE: 1 INJECTION, POWDER, LYOPHILIZED, FOR SOLUTION INTRAVENOUS at 08:55

## 2022-11-21 RX ADMIN — SODIUM CHLORIDE, PRESERVATIVE FREE 10 ML: 5 INJECTION INTRAVENOUS at 07:38

## 2022-11-21 RX ADMIN — INSULIN LISPRO 2 UNITS: 100 INJECTION, SOLUTION INTRAVENOUS; SUBCUTANEOUS at 11:28

## 2022-11-21 RX ADMIN — SODIUM CHLORIDE, PRESERVATIVE FREE 10 ML: 5 INJECTION INTRAVENOUS at 13:59

## 2022-11-21 NOTE — DISCHARGE SUMMARY
HOSPITALIST DISCHARGE NOTE  Radhika Lowery MD, [address] [facility]       PATIENT ID: Bob Mason  MRN: [id number]   YOB: 1935    DATE OF ADMISSION: 11/17/2022  6:45 PM    DATE OF DISCHARGE: 11/21/22    PRIMARY CARE PROVIDER: Ann Marie Hurtado MD     ATTENDING PHYSICIAN: Radhika Lowery MD  DISCHARGING PROVIDER: Radhika Lowery MD        CONSULTATIONS: IP CONSULT TO CARDIOLOGY    PROCEDURES/SURGERIES: * No surgery found *    ADMITTING [address] [place] [address] COURSE:     Bob Mason is a 82 y.o. female  has a past medical history of Diabetes ([address] High cholesterol, and Hypertension. Patient seen at bedside. Patient came to emergency room tonight with chest pain to her epigastric area that she explained as a pressure and it did radiate to her jaw and she has had it for a week. Patient states she has had the pain on and off for a week she has not taken anything for the pain or called her primary care doctor. Patient states she thought it was gas and was trying to belch. At this point in time patient states she is pain-free except for on exam she was found tender in the epigastric area. Patient will be admitted to the hospitalist group on observation no echo is found in the history I will order an echocardiogram cardiology consult serial labs and troponins. DISCHARGE DIAGNOSES / PLAN:      Chest pain  Chest pain is more epigastric  Patient was trying to belch Maalox helped in the ED  Aspirin daily  Nitroglycerin Q 12. Appreciate cardiology consultation with nuclear stress test with Constantino Angelr is essentially normal however patient continued to have chest pain through the stress test.  Recommended for outpatient cardiac catheterization per Elia Swain or cardiology follow-up. Imdur 30 mg daily added per cardiology. Continue aspirin, metoprolol.      Diabetes mellitus type 2, controlled ([address]  Hold oral hypoglycemics at this time  Continue Lantus  Sliding scale AC at bedtime  Diabetic diet     Hypertension  Continue hydrochlorothiazide, Tenormin, Norvasc and Clonidine. PENDING TEST RESULTS:   At the time of discharge the following test results are still pending: None    FOLLOW UP APPOINTMENTS:    Follow-up Information       Follow up With Specialties Details Why Contact Info    Twan Puentes MD Family Medicine   [address] [place] [id number] [phone]               DIET: Diabetic Diet    ACTIVITY: Activity as tolerated    DISCHARGE MEDICATIONS:  Current Discharge Medication List        START taking these medications    Details   isosorbide mononitrate ER (IMDUR) 30 mg tablet Take 1 Tablet by mouth daily for 30 days. Qty: 30 Tablet, Refills: 0  Start date: 11/21/2022, End date: 12/21/2022           CONTINUE these medications which have NOT CHANGED    Details   amLODIPine (NORVASC) 5 mg tablet Take 5 mg by mouth daily. atenoloL (TENORMIN) 50 mg tablet Take 50 mg by mouth daily. atorvastatin (LIPITOR) 40 mg tablet Take 40 mg by mouth daily. cloNIDine (CATAPRES) 0.1 mg/24 hr ptwk APPLY 1 PATCH EVERY WEEK AS DIRECTED. STOP CLONIDINE TABLETS      glimepiride (AMARYL) 2 mg tablet Take 2 mg by mouth daily. hydroCHLOROthiazide (HYDRODIURIL) 25 mg tablet Take 25 mg by mouth daily. Lantus Solostar U-100 Insulin 100 unit/mL (3 mL) inpn 15 Units by SubCUTAneous route nightly. Recent Days:  Recent Labs     11/19/22  0300   WBC 7.4   HGB 13.4   HCT 40.7   *     Recent Labs     11/20/22  0342 11/19/22  0300    141   K 3.6 2.9*    102   CO2 29 31   * 118*   BUN 22* 19*   CREA 1.15 0.99   CA 10.1 10.0   MG 1.8 1.8   PHOS  --  2.2*     No results for input(s): PH, PCO2, PO2, HCO3, FIO2 in the last 72 hours.       Recent Results (from the past 336 hour(s))   EKG, 12 LEAD, INITIAL    Collection Time: 11/17/22  6:46 PM   Result Value Ref Range    Ventricular Rate 72 BPM    Atrial Rate 72 BPM    P-R Interval 190 ms    QRS Duration 80 ms    Q-T Interval 393 ms    QTC Calculation (Bezet) 431 ms    Calculated P Axis -26 degrees    Calculated R Axis 12 degrees    Calculated T Axis 29 degrees    Diagnosis       Sinus rhythm    Confirmed by Galen Rodríguez ([id number]) on 11/18/2022 4:30:55 PM     CBC WITH AUTOMATED DIFF    Collection Time: 11/17/22  6:58 PM   Result Value Ref Range    WBC 10.9 4.6 - 13.2 K/uL    RBC 4.76 4.20 - 5.30 M/uL    HGB 15.3 12.0 - 16.0 g/dL    HCT 44.9 35.0 - 45.0 %    MCV 94.3 78.0 - 100.0 FL    MCH 32.1 24.0 - 34.0 PG    MCHC 34.1 31.0 - 37.0 g/dL    RDW 13.4 11.6 - 14.5 %    PLATELET 010 (L) 396 - 420 K/uL    MPV 12.3 (H) 9.2 - 11.8 FL    NRBC 0.0 0.0  WBC    ABSOLUTE NRBC 0.00 0.00 - 0.01 K/uL    NEUTROPHILS 65 40 - 73 %    LYMPHOCYTES 23 21 - 52 %    MONOCYTES 9 3 - 10 %    EOSINOPHILS 1 0 - 5 %    BASOPHILS 1 0 - 2 %    IMMATURE GRANULOCYTES 1 (H) 0 - 0.5 %    ABS. NEUTROPHILS 7.1 1.8 - 8.0 K/UL    ABS. LYMPHOCYTES 2.5 0.9 - 3.6 K/UL    ABS. MONOCYTES 1.0 0.05 - 1.2 K/UL    ABS. EOSINOPHILS 0.1 0.0 - 0.4 K/UL    ABS. BASOPHILS 0.1 0.0 - 0.1 K/UL    ABS. IMM. GRANS. 0.1 (H) 0.00 - 0.04 K/UL    DF AUTOMATED     METABOLIC PANEL, COMPREHENSIVE    Collection Time: 11/17/22  6:58 PM   Result Value Ref Range    Sodium 138 136 - 145 mmol/L    Potassium 3.6 3.5 - 5.5 mmol/L    Chloride 101 100 - 111 mmol/L    CO2 31 21 - 32 mmol/L    Anion gap 6 3.0 - 18.0 mmol/L    Glucose 158 (H) 74 - 99 mg/dL    BUN 26 (H) 7 - 18 mg/dL    Creatinine 1.17 0.60 - 1.30 mg/dL    BUN/Creatinine ratio 22 (H) 12 - 20      eGFR 46 (L) >60 ml/min/1.73m2    Calcium 10.2 (H) 8.5 - 10.1 mg/dL    Bilirubin, total 0.6 0.2 - 1.0 mg/dL    AST (SGOT) 26 10 - 38 U/L    ALT (SGPT) 38 13 - 56 U/L    Alk.  phosphatase 68 45 - 117 U/L    Protein, total 6.9 6.4 - 8.2 g/dL    Albumin 3.3 (L) 3.4 - 5.0 g/dL    Globulin 3.6 2.0 - 4.0 g/dL    A-G Ratio 0.9 0.8 - 1.7     NT-PRO BNP    Collection Time: 11/17/22  6:58 PM   Result Value Ref Range    NT pro-BNP 94 0 - 1,800 pg/mL   TROPONIN-HIGH SENSITIVITY    Collection Time: 11/17/22  6:58 PM   Result Value Ref Range    Troponin-High Sensitivity 56 (H) 0 - 54 ng/L   LIPASE    Collection Time: 11/17/22  6:58 PM   Result Value Ref Range    Lipase 132 73 - 393 U/L   PROTHROMBIN TIME + INR    Collection Time: 11/17/22  6:58 PM   Result Value Ref Range    Prothrombin time 14.1 11.5 - 15.2 sec    INR 1.0 0.8 - 1.2     TROPONIN-HIGH SENSITIVITY    Collection Time: 11/17/22  9:04 PM   Result Value Ref Range    Troponin-High Sensitivity 58 (H) 0 - 54 ng/L   METABOLIC PANEL, COMPREHENSIVE    Collection Time: 11/18/22  3:30 AM   Result Value Ref Range    Sodium 140 136 - 145 mmol/L    Potassium 3.4 (L) 3.5 - 5.5 mmol/L    Chloride 102 100 - 111 mmol/L    CO2 31 21 - 32 mmol/L    Anion gap 7 3.0 - 18.0 mmol/L    Glucose 175 (H) 74 - 99 mg/dL    BUN 22 (H) 7 - 18 mg/dL    Creatinine 1.02 0.60 - 1.30 mg/dL    BUN/Creatinine ratio 22 (H) 12 - 20      eGFR 54 (L) >60 ml/min/1.73m2    Calcium 9.7 8.5 - 10.1 mg/dL    Bilirubin, total 0.9 0.2 - 1.0 mg/dL    AST (SGOT) 22 10 - 38 U/L    ALT (SGPT) 30 13 - 56 U/L    Alk.  phosphatase 59 45 - 117 U/L    Protein, total 6.2 (L) 6.4 - 8.2 g/dL    Albumin 3.1 (L) 3.4 - 5.0 g/dL    Globulin 3.1 2.0 - 4.0 g/dL    A-G Ratio 1.0 0.8 - 1.7     MAGNESIUM    Collection Time: 11/18/22  3:30 AM   Result Value Ref Range    Magnesium 1.8 1.6 - 2.6 mg/dL   CBC WITH AUTOMATED DIFF    Collection Time: 11/18/22  3:30 AM   Result Value Ref Range    WBC 9.8 4.6 - 13.2 K/uL    RBC 4.40 4.20 - 5.30 M/uL    HGB 13.9 12.0 - 16.0 g/dL    HCT 41.8 35.0 - 45.0 %    MCV 95.0 78.0 - 100.0 FL    MCH 31.6 24.0 - 34.0 PG    MCHC 33.3 31.0 - 37.0 g/dL    RDW 13.5 11.6 - 14.5 %    PLATELET 214 (L) 426 - 420 K/uL    MPV 12.5 (H) 9.2 - 11.8 FL    NRBC 0.0 0.0  WBC    ABSOLUTE NRBC 0.00 0.00 - 0.01 K/uL    NEUTROPHILS 59 40 - 73 %    LYMPHOCYTES 29 21 - 52 %    MONOCYTES 10 3 - 10 % EOSINOPHILS 1 0 - 5 %    BASOPHILS 1 0 - 2 %    IMMATURE GRANULOCYTES 0 0 - 0.5 %    ABS. NEUTROPHILS 5.8 1.8 - 8.0 K/UL    ABS. LYMPHOCYTES 2.8 0.9 - 3.6 K/UL    ABS. MONOCYTES 1.0 0.05 - 1.2 K/UL    ABS. EOSINOPHILS 0.1 0.0 - 0.4 K/UL    ABS. BASOPHILS 0.1 0.0 - 0.1 K/UL    ABS. IMM.  GRANS. 0.0 0.00 - 0.04 K/UL    DF AUTOMATED     TROPONIN-HIGH SENSITIVITY    Collection Time: 11/18/22  3:30 AM   Result Value Ref Range    Troponin-High Sensitivity 60 (H) 0 - 54 ng/L   ECHO ADULT COMPLETE    Collection Time: 11/18/22  8:20 AM   Result Value Ref Range    LV EDV A2C 33 mL    LV EDV A4C 53 mL    LV ESV A2C 10 mL    LV ESV A4C 16 mL    IVSd 1.2 (A) 0.6 - 0.9 cm    LVIDd 3.6 (A) 3.9 - 5.3 cm    LVIDs 2.3 cm    LVOT Diameter 2.0 cm    LVOT Mean Gradient 4 mmHg    LVOT VTI 32.7 cm    LVOT Peak Velocity 1.4 m/s    LVOT Peak Gradient 8 mmHg    LVPWd 1.3 (A) 0.6 - 0.9 cm    LV E' Lateral Velocity 8 cm/s    LV E' Septal Velocity 6 cm/s    LV Ejection Fraction A2C 70 %    LV Ejection Fraction A4C 69 %    EF BP 70 55 - 100 %    LVOT Area 3.1 cm2    LVOT .7 ml    LA Minor Axis 5.6 cm    LA Major Axis 5.8 cm    LA Area 2C 17.3 cm2    LA Area 4C 17.9 cm2    LA Volume BP 46 22 - 52 mL    LA Diameter 4.1 cm    RA Area 4C 14.9 cm2    RA Volume 36 ml    Est. RA Pressure 3 mmHg    AV Mean Gradient 6 mmHg    AV VTI 36.2 cm    AV Mean Velocity 1.1 m/s    AV Peak Velocity 1.6 m/s    AV Peak Gradient 10 mmHg    AV Area by VTI 2.8 cm2    AV Area by Peak Velocity 2.7 cm2    Aortic Root 3.4 cm    Ascending Aorta 3.7 cm    IVC Proxmal 1.9 cm    MV E Wave Deceleration Time 368.0 ms    MV A Velocity 1.12 m/s    MV E Velocity 0.82 m/s    MV Mean Gradient 1 mmHg    MV VTI 33.5 cm    MV Mean Velocity 0.5 m/s    MV Max Velocity 1.1 m/s    MV Peak Gradient 4 mmHg    MV Area by VTI 3.1 cm2    PV Max Velocity 1.1 m/s    PV Peak Gradient 5 mmHg    RV Basal Dimension 3.1 cm    RV Longitudinal Dimension 5.5 cm    RV Mid Dimension 2.4 cm    TAPSE 1. 7 1.7 cm    TR Max Velocity 2.60 m/s    TR Peak Gradient 27 mmHg    Fractional Shortening 2D 36 28 - 44 %    LV ESV Index A4C 10 mL/m2    LV EDV Index A4C 32 mL/m2    LV ESV Index A2C 6 mL/m2    LV EDV Index A2C 20 mL/m2    LVIDd Index 2.14 cm/m2    LVIDs Index 1.37 cm/m2    LV RWT Ratio 0.72     LV Mass 2D 150.6 67 - 162 g    LV Mass 2D Index 89.7 43 - 95 g/m2    MV E/A 0.73     E/E' Ratio (Averaged) 11.96     E/E' Lateral 10.25     E/E' Septal 13.67     LA Volume Index BP 27 16 - 34 ml/m2    LVOT Stroke Volume Index 61.1 mL/m2    LA Size Index 2.44 cm/m2    LA/AO Root Ratio 1.21     RA Volume Index A4C 21 mL/m2    Ao Root Index 2.02 cm/m2    Ascending Aorta Index 2.20 cm/m2    AV Velocity Ratio 0.88     LVOT:AV VTI Index 0.90     MARIANA/BSA VTI 1.7 cm2/m2    MARIANA/BSA Peak Velocity 1.6 cm2/m2    MV:LVOT VTI Index 1.02     RVSP 30 mmHg   TROPONIN-HIGH SENSITIVITY    Collection Time: 11/18/22  9:20 AM   Result Value Ref Range    Troponin-High Sensitivity 59 (H) 0 - 54 ng/L   GLUCOSE, POC    Collection Time: 11/18/22 11:17 AM   Result Value Ref Range    Glucose (POC) 245 (H) 70 - 110 mg/dL    Performed by [address] [place], [place] [address]vd, POC    Collection Time: 11/18/22  4:30 PM   Result Value Ref Range    Glucose (POC) 105 70 - 110 mg/dL    Performed by Iman Kimball    GLUCOSE, POC    Collection Time: 11/18/22  9:43 PM   Result Value Ref Range    Glucose (POC) 209 (H) 70 - 110 mg/dL    Performed by Hiren Paul    CBC WITH AUTOMATED DIFF    Collection Time: 11/19/22  3:00 AM   Result Value Ref Range    WBC 7.4 4.6 - 13.2 K/uL    RBC 4.31 4.20 - 5.30 M/uL    HGB 13.4 12.0 - 16.0 g/dL    HCT 40.7 35.0 - 45.0 %    MCV 94.4 78.0 - 100.0 FL    MCH 31.1 24.0 - 34.0 PG    MCHC 32.9 31.0 - 37.0 g/dL    RDW 13.5 11.6 - 14.5 %    PLATELET 255 (L) 070 - 420 K/uL    MPV 12.6 (H) 9.2 - 11.8 FL    NRBC 0.0 0.0  WBC    ABSOLUTE NRBC 0.00 0.00 - 0.01 K/uL    NEUTROPHILS 46 40 - 73 %    LYMPHOCYTES 40 21 - 52 % MONOCYTES 11 (H) 3 - 10 %    EOSINOPHILS 2 0 - 5 %    BASOPHILS 1 0 - 2 %    IMMATURE GRANULOCYTES 0 0 - 0.5 %    ABS. NEUTROPHILS 3.4 1.8 - 8.0 K/UL    ABS. LYMPHOCYTES 2.9 0.9 - 3.6 K/UL    ABS. MONOCYTES 0.8 0.05 - 1.2 K/UL    ABS. EOSINOPHILS 0.1 0.0 - 0.4 K/UL    ABS. BASOPHILS 0.1 0.0 - 0.1 K/UL    ABS. IMM.  GRANS. 0.0 0.00 - 0.04 K/UL    DF AUTOMATED     MAGNESIUM    Collection Time: 11/19/22  3:00 AM   Result Value Ref Range    Magnesium 1.8 1.6 - 2.6 mg/dL   METABOLIC PANEL, BASIC    Collection Time: 11/19/22  3:00 AM   Result Value Ref Range    Sodium 141 136 - 145 mmol/L    Potassium 2.9 (LL) 3.5 - 5.5 mmol/L    Chloride 102 100 - 111 mmol/L    CO2 31 21 - 32 mmol/L    Anion gap 8 3.0 - 18.0 mmol/L    Glucose 118 (H) 74 - 99 mg/dL    BUN 19 (H) 7 - 18 mg/dL    Creatinine 0.99 0.60 - 1.30 mg/dL    BUN/Creatinine ratio 19 12 - 20      eGFR 56 (L) >60 ml/min/1.73m2    Calcium 10.0 8.5 - 10.1 mg/dL   PHOSPHORUS    Collection Time: 11/19/22  3:00 AM   Result Value Ref Range    Phosphorus 2.2 (L) 2.5 - 4.9 mg/dL   GLUCOSE, POC    Collection Time: 11/19/22 11:36 AM   Result Value Ref Range    Glucose (POC) 150 (H) 70 - 110 mg/dL    Performed by [facility], POC    Collection Time: 11/19/22  4:05 PM   Result Value Ref Range    Glucose (POC) 202 (H) 70 - 110 mg/dL    Performed by [facility], POC    Collection Time: 11/19/22  8:43 PM   Result Value Ref Range    Glucose (POC) 134 (H) 70 - 110 mg/dL    Performed by Amber Ron    METABOLIC PANEL, BASIC    Collection Time: 11/20/22  3:42 AM   Result Value Ref Range    Sodium 139 136 - 145 mmol/L    Potassium 3.6 3.5 - 5.5 mmol/L    Chloride 103 100 - 111 mmol/L    CO2 29 21 - 32 mmol/L    Anion gap 7 3.0 - 18.0 mmol/L    Glucose 151 (H) 74 - 99 mg/dL    BUN 22 (H) 7 - 18 mg/dL    Creatinine 1.15 0.60 - 1.30 mg/dL    BUN/Creatinine ratio 19 12 - 20      eGFR 47 (L) >60 ml/min/1.73m2    Calcium 10.1 8.5 - 10.1 mg/dL   MAGNESIUM    Collection Time: 11/20/22  3:42 AM   Result Value Ref Range    Magnesium 1.8 1.6 - 2.6 mg/dL   GLUCOSE, POC    Collection Time: 11/20/22  7:26 AM   Result Value Ref Range    Glucose (POC) 136 (H) 70 - 110 mg/dL    Performed by [address], POC    Collection Time: 11/20/22 11:31 AM   Result Value Ref Range    Glucose (POC) 241 (H) 70 - 110 mg/dL    Performed by [address], POC    Collection Time: 11/20/22  3:45 PM   Result Value Ref Range    Glucose (POC) 144 (H) 70 - 110 mg/dL    Performed by [address], POC    Collection Time: 11/20/22 10:26 PM   Result Value Ref Range    Glucose (POC) 140 (H) 70 - 110 mg/dL    Performed by [address] [place] [address], POC    Collection Time: 11/21/22  7:14 AM   Result Value Ref Range    Glucose (POC) 133 (H) 70 - 110 mg/dL    Performed by [address] [place] [address] STRESS TEST    Collection Time: 11/21/22 10:28 AM   Result Value Ref Range    Stress Target  bpm    Exercise Duration Time 4 min    Exercuse Duration Seconds 0 sec    Stress Systolic  mmHg    Stress Diastolic BP 88 mmHg    Stress Peak HR 93 BPM    Baseline HR 60 BPM    Stress Estimated Workload 1.0 METS    Stress Rate Pressure Product 16,182 BPM*mmHg    Stress Percent HR Achieved 69 %    Baseline ST Depression 0 mm    Stress ST Depression 0 mm    Nuc Stress EF 75 %   GLUCOSE, POC    Collection Time: 11/21/22 11:07 AM   Result Value Ref Range    Glucose (POC) 169 (H) 70 - 110 mg/dL    Performed by Harjit ePrry         NOTIFY YOUR PHYSICIAN FOR ANY OF THE FOLLOWING:   Fever over 101 degrees for 24 hours. Chest pain, shortness of breath, fever, chills, nausea, vomiting, diarrhea, change in mentation, falling, weakness, bleeding. Severe pain or pain not relieved by medications. Or, any other signs or symptoms that you may have questions about.     DISPOSITION:  x  Home With:   OT  PT  HH  RN       Long term SNF/Inpatient Rehab    Independent/assisted living    Hospice    Other: PATIENT CONDITION AT DISCHARGE:     Functional status    Poor     Deconditioned    x Independent      Cognition    x Lucid     Forgetful     Dementia      Catheters/lines (plus indication)    Glass     PICC     PEG    x None      Code status    x Full code     DNR      PHYSICAL EXAMINATION AT DISCHARGE:  General:          Alert, cooperative, no distress, appears stated age. HEENT:           Atraumatic, anicteric sclerae, pink conjunctivae                          No oral ulcers, mucosa moist, throat clear, dentition fair  Neck:               Supple, symmetrical  Lungs:             Clear to auscultation bilaterally. No Wheezing or Rhonchi. No rales. Chest wall:      No tenderness  No Accessory muscle use. Heart:              Regular  rhythm,  No  murmur   No edema  Abdomen:        Soft, non-tender. Not distended. Bowel sounds normal  Extremities:     No cyanosis. No clubbing,                            Skin turgor normal, Capillary refill normal  Skin:                Not pale. Not Jaundiced  No rashes   Psych:             Not anxious or agitated.   Neurologic:      Alert, moves all extremities, answers questions appropriately and responds to commands       [address]:  Problem List as of 11/21/2022 Never Reviewed            Codes Class Noted - Resolved    Hypertension ICD-10-CM: I10  ICD-9-CM: 401.9  11/18/2022 - Present        Diabetes mellitus type 2, controlled ([facility] ICD-10-CM: E11.9  ICD-9-CM: 250.00  11/18/2022 - Present        Chest pain ICD-10-CM: R07.9  ICD-9-CM: 786.50  11/17/2022 - Present           Greater than 35 minutes were spent with the patient on counseling and coordination of care    Signed:   Radhika Lowery MD  11/21/2022  3:51 PM

## 2022-11-21 NOTE — PROGRESS NOTES
0700- Pt received. Vitals obtained. Glucose checked. Pt up to restroom. IV dressing changed. 0830- Pt is off the floor for scheduled stress test. Pt medications held.

## 2022-11-21 NOTE — PROGRESS NOTES
1900-Assumed care of pt from off going nurse. 2230-HS med given and snack, pt educated on NPO @ midnight pending stress test in the A.M.    0030-Pt resting in bed no acute distress or sob, pt ambulated to bathroom back to bed, call bell in reach. 0420-Pt sleeping during rounds, easy to arouse, no needs voiced, no acute distress or sob, call bell in reach.

## 2022-11-21 NOTE — PROGRESS NOTES
Patient will be going home today with [facility] as her [facility] agency. Information sent to [facility].

## 2022-11-21 NOTE — PROGRESS NOTES
CARDIOLOGY PROGRESS NOTE - NP    Patient seen and examined. This is a patient who is followed for chest/epigastric pain. She was NPO this morning for a nuclear stress test.  She had some mild chest discomfort during stress test, however, EKG and nuclear images negative for ischemia/infarct. Echocardiogram with preserved EF and normal wall motion. No other complaints reported. Telemetry reviewed, there were no events noted in the past 24 hours. Remains in NSR. SB in the 50s overnight. Pertinent review of systems items noted above, all other systems are negative. Current medications reviewed. Physical Examination  Vital signs are stable. Blood pressure 155/73, Pulse 61  No apparent distress. Heart is regular, rate and rhythm. Normal S1, S2, no murmurs are appreciated. Lungs are clear bilaterally. Abdomen is soft, nontender, normal bowel sounds. Extremities have no edema. Labs reviewed:     Case discussed with Dr. Rachael Camejo and our impression and recommendations are as follows:    Chest pain, seems atypical but multiple risk factors noted  Trop 33-68-80-04 and ECG nonischemic  Echo with preserved EF and wall motion  Nuclear stress test today negative. Will discharge with Imdur 30mg daily and plan to see back in office on 12/2/22. Given IDDM will have a low threshold to proceed with cath if she continues with symptoms. Continue asa 81mg and high intensity statin daily. Hypertension:  Above goal today, however, has not received all of her anti-hypertensives due to being off of the floor. Adding Imdur as stated. Hyperlipidemia:  Continue statin  SVT: None noted this admission. Continue beta blocker. A. Echo as noted above  bPossible EP eval as OP  DM, per primary    She is considered stable for discharge. Please do not hesitate to call me or Dr. Rachael Camejo if additional questions arise.

## 2022-11-22 NOTE — PROGRESS NOTES
Physician Progress Note      Kelly Gómez  [facility] #:                  [id number]  :                       1935  ADMIT DATE:       2022 6:45 PM  Syd Jin DATE:        2022 4:39 PM  RESPONDING  PROVIDER #:        Gabe Pacheco MD          QUERY TEXT:    Pt admitted with Chest Pain. Pt noted to have negative Lexiscan with continued chest pain through out test. If possible, please document in progress notes and discharge summary if you are evaluating and/or treating any of the following: The medical record reflects the following:  Risk Factors: 81 y/o, PMH SVT, IDDM, HTN    Clinical Indicators:  * H&P: Patient states she has had the pain on and off for a week she has not taken anything for the pain or called her primary care doctor. Patient states she thought it was gas and was trying to belch. At this point in time patient states she is pain-free except for on exam she was found tender in the epigastric area. *  PN: Still having chest symptoms Some relief from maalox  * nuclear stress test with Maximus Ayers is essentially normal however patient continued to have chest pain through the stress test.    Treatment: Cardiology consult, Echo, Nuclear Stress Test, Nitro paste, ASA, DC on Imdur    Thank you,  Phan ANGELN, RN, CRCR  Please contact me for any questions or concerns regarding this query at [email or web address]  Options provided:  -- Chest pain due to CAD with unstable angina  -- Chest pain due to GERD  -- Chest pain due to(please specify cause), Please specify cause. -- Other - I will add my own diagnosis  -- Disagree - Not applicable / Not valid  -- Disagree - Clinically unable to determine / Unknown  -- Refer to Clinical Documentation Reviewer    PROVIDER RESPONSE TEXT:    This patient has chest pain due to GERD.     Query created by: Radha Jones on 2022 10:39 AM      Electronically signed by:  Gabe Pacheco MD 2022 3:38 PM

## 2023-01-31 ENCOUNTER — HOSPITAL ENCOUNTER (OUTPATIENT)
Dept: GENERAL RADIOLOGY | Age: 88
Discharge: HOME | End: 2023-01-31
Payer: MEDICARE

## 2023-01-31 ENCOUNTER — TRANSCRIBE ORDER (OUTPATIENT)
Dept: REGISTRATION | Age: 88
End: 2023-01-31

## 2023-01-31 DIAGNOSIS — M25.512 LEFT SHOULDER PAIN: Primary | ICD-10-CM

## 2023-01-31 DIAGNOSIS — M25.512 LEFT SHOULDER PAIN: ICD-10-CM

## 2023-01-31 PROCEDURE — 73030 X-RAY EXAM OF SHOULDER: CPT

## 2023-02-01 RX ORDER — ASPIRIN 81 MG/1
81 TABLET ORAL DAILY
COMMUNITY

## 2023-02-01 RX ORDER — ISOSORBIDE MONONITRATE 30 MG/1
30 TABLET, EXTENDED RELEASE ORAL DAILY
COMMUNITY
Start: 2022-12-22

## 2023-02-01 RX ORDER — CLONIDINE HYDROCHLORIDE 0.1 MG/1
0.1 TABLET ORAL 2 TIMES DAILY
COMMUNITY
Start: 2022-12-06

## 2023-02-07 PROBLEM — I50.9 CHF (CONGESTIVE HEART FAILURE) (HCC): Status: ACTIVE | Noted: 2023-02-07

## 2023-02-07 RX ORDER — SODIUM CHLORIDE 9 MG/ML
50 INJECTION, SOLUTION INTRAVENOUS CONTINUOUS
Status: DISCONTINUED | OUTPATIENT
Start: 2023-02-08 | End: 2023-02-07

## 2023-02-07 RX ORDER — SODIUM CHLORIDE 9 MG/ML
50 INJECTION, SOLUTION INTRAVENOUS CONTINUOUS
Status: CANCELLED | OUTPATIENT
Start: 2023-02-08

## 2023-02-08 ENCOUNTER — HOSPITAL ENCOUNTER (OUTPATIENT)
Age: 88
Setting detail: OBSERVATION
Discharge: HOME | End: 2023-02-09
Attending: STUDENT IN AN ORGANIZED HEALTH CARE EDUCATION/TRAINING PROGRAM | Admitting: STUDENT IN AN ORGANIZED HEALTH CARE EDUCATION/TRAINING PROGRAM
Payer: MEDICARE

## 2023-02-08 DIAGNOSIS — I50.9 CONGESTIVE HEART FAILURE, UNSPECIFIED HF CHRONICITY, UNSPECIFIED HEART FAILURE TYPE (HCC): ICD-10-CM

## 2023-02-08 PROBLEM — I25.10 CAD (CORONARY ARTERY DISEASE): Status: ACTIVE | Noted: 2023-02-08

## 2023-02-08 LAB
ALBUMIN SERPL-MCNC: 3.7 G/DL (ref 3.5–5)
ALBUMIN/GLOB SERPL: 0.9 (ref 1.1–2.2)
ALP SERPL-CCNC: 66 U/L (ref 45–117)
ALT SERPL-CCNC: 24 U/L (ref 12–78)
ANION GAP BLD CALC-SCNC: 14
ANION GAP SERPL CALC-SCNC: 5 MMOL/L (ref 5–15)
ANION GAP SERPL CALC-SCNC: 7 MMOL/L (ref 5–15)
APTT PPP: 25.7 SEC (ref 21.2–34.1)
AST SERPL W P-5'-P-CCNC: ABNORMAL U/L (ref 15–37)
ATRIAL RATE: 60 BPM
BILIRUB SERPL-MCNC: 0.9 MG/DL (ref 0.2–1)
BUN SERPL-MCNC: 17 MG/DL (ref 6–20)
BUN SERPL-MCNC: 18 MG/DL (ref 6–20)
BUN/CREAT SERPL: 14 (ref 12–20)
BUN/CREAT SERPL: 18 (ref 12–20)
CA-I BLD-MCNC: 1.16 MMOL/L (ref 1.12–1.32)
CA-I BLD-MCNC: 10.5 MG/DL (ref 8.5–10.1)
CA-I BLD-MCNC: 9.8 MG/DL (ref 8.5–10.1)
CALCULATED R AXIS, ECG10: 13 DEGREES
CALCULATED T AXIS, ECG11: 19 DEGREES
CHLORIDE BLD-SCNC: 99 MMOL/L (ref 98–107)
CHLORIDE SERPL-SCNC: 103 MMOL/L (ref 97–108)
CHLORIDE SERPL-SCNC: 99 MMOL/L (ref 97–108)
CO2 BLD-SCNC: 28 MMOL/L
CO2 SERPL-SCNC: 30 MMOL/L (ref 21–32)
CO2 SERPL-SCNC: 33 MMOL/L (ref 21–32)
CREAT SERPL-MCNC: 0.92 MG/DL (ref 0.55–1.02)
CREAT SERPL-MCNC: 1.27 MG/DL (ref 0.55–1.02)
CREAT UR-MCNC: 0.94 MG/DL (ref 0.6–1.3)
DIAGNOSIS, 93000: NORMAL
ERYTHROCYTE [DISTWIDTH] IN BLOOD BY AUTOMATED COUNT: 12.3 % (ref 11.5–14.5)
GLOBULIN SER CALC-MCNC: 4.1 G/DL (ref 2–4)
GLUCOSE BLD STRIP.AUTO-MCNC: 157 MG/DL (ref 65–100)
GLUCOSE BLD STRIP.AUTO-MCNC: 162 MG/DL (ref 65–100)
GLUCOSE BLD STRIP.AUTO-MCNC: 211 MG/DL (ref 65–100)
GLUCOSE BLD STRIP.AUTO-MCNC: 82 MG/DL (ref 65–100)
GLUCOSE BLD STRIP.AUTO-MCNC: 84 MG/DL (ref 65–100)
GLUCOSE SERPL-MCNC: 165 MG/DL (ref 65–100)
GLUCOSE SERPL-MCNC: 88 MG/DL (ref 65–100)
HCT VFR BLD AUTO: 43.7 % (ref 35–47)
HGB BLD-MCNC: 14.8 G/DL (ref 11.5–16)
INR PPP: 1.1 (ref 0.9–1.1)
MCH RBC QN AUTO: 30.5 PG (ref 26–34)
MCHC RBC AUTO-ENTMCNC: 33.9 G/DL (ref 30–36.5)
MCV RBC AUTO: 90.1 FL (ref 80–99)
NRBC # BLD: 0 K/UL (ref 0–0.01)
NRBC BLD-RTO: 0 PER 100 WBC
P-R INTERVAL, ECG05: 192 MS
PERFORMED BY, TECHID: ABNORMAL
PERFORMED BY, TECHID: NORMAL
PLATELET # BLD AUTO: 153 K/UL (ref 150–400)
PMV BLD AUTO: 12.4 FL (ref 8.9–12.9)
POTASSIUM BLD-SCNC: 2.7 MMOL/L (ref 3.5–5.5)
POTASSIUM SERPL-SCNC: 2.7 MMOL/L (ref 3.5–5.1)
POTASSIUM SERPL-SCNC: ABNORMAL MMOL/L (ref 3.5–5.1)
PROT SERPL-MCNC: 7.8 G/DL (ref 6.4–8.2)
PROTHROMBIN TIME: 13.9 SEC (ref 11.9–14.6)
Q-T INTERVAL, ECG07: 452 MS
QRS DURATION, ECG06: 92 MS
QTC CALCULATION (BEZET), ECG08: 452 MS
RBC # BLD AUTO: 4.85 M/UL (ref 3.8–5.2)
SODIUM BLD-SCNC: 139 MMOL/L (ref 136–145)
SODIUM SERPL-SCNC: 137 MMOL/L (ref 136–145)
SODIUM SERPL-SCNC: 140 MMOL/L (ref 136–145)
THERAPEUTIC RANGE,PTTT: NORMAL SEC (ref 82–109)
VENTRICULAR RATE, ECG03: 60 BPM
WBC # BLD AUTO: 7.2 K/UL (ref 3.6–11)

## 2023-02-08 PROCEDURE — 74011636637 HC RX REV CODE- 636/637: Performed by: STUDENT IN AN ORGANIZED HEALTH CARE EDUCATION/TRAINING PROGRAM

## 2023-02-08 PROCEDURE — 80053 COMPREHEN METABOLIC PANEL: CPT

## 2023-02-08 PROCEDURE — 82962 GLUCOSE BLOOD TEST: CPT

## 2023-02-08 PROCEDURE — 93458 L HRT ARTERY/VENTRICLE ANGIO: CPT | Performed by: STUDENT IN AN ORGANIZED HEALTH CARE EDUCATION/TRAINING PROGRAM

## 2023-02-08 PROCEDURE — 74011250637 HC RX REV CODE- 250/637: Performed by: STUDENT IN AN ORGANIZED HEALTH CARE EDUCATION/TRAINING PROGRAM

## 2023-02-08 PROCEDURE — 74011250636 HC RX REV CODE- 250/636: Performed by: STUDENT IN AN ORGANIZED HEALTH CARE EDUCATION/TRAINING PROGRAM

## 2023-02-08 PROCEDURE — 99152 MOD SED SAME PHYS/QHP 5/>YRS: CPT | Performed by: STUDENT IN AN ORGANIZED HEALTH CARE EDUCATION/TRAINING PROGRAM

## 2023-02-08 PROCEDURE — 76210000016 HC OR PH I REC 1 TO 1.5 HR: Performed by: STUDENT IN AN ORGANIZED HEALTH CARE EDUCATION/TRAINING PROGRAM

## 2023-02-08 PROCEDURE — 85730 THROMBOPLASTIN TIME PARTIAL: CPT

## 2023-02-08 PROCEDURE — 76937 US GUIDE VASCULAR ACCESS: CPT | Performed by: STUDENT IN AN ORGANIZED HEALTH CARE EDUCATION/TRAINING PROGRAM

## 2023-02-08 PROCEDURE — 2709999900 HC NON-CHARGEABLE SUPPLY: Performed by: STUDENT IN AN ORGANIZED HEALTH CARE EDUCATION/TRAINING PROGRAM

## 2023-02-08 PROCEDURE — C1769 GUIDE WIRE: HCPCS | Performed by: STUDENT IN AN ORGANIZED HEALTH CARE EDUCATION/TRAINING PROGRAM

## 2023-02-08 PROCEDURE — C1894 INTRO/SHEATH, NON-LASER: HCPCS | Performed by: STUDENT IN AN ORGANIZED HEALTH CARE EDUCATION/TRAINING PROGRAM

## 2023-02-08 PROCEDURE — 85027 COMPLETE CBC AUTOMATED: CPT

## 2023-02-08 PROCEDURE — 77030040934 HC CATH DIAG DXTERITY MEDT -A: Performed by: STUDENT IN AN ORGANIZED HEALTH CARE EDUCATION/TRAINING PROGRAM

## 2023-02-08 PROCEDURE — 74011000636 HC RX REV CODE- 636: Performed by: STUDENT IN AN ORGANIZED HEALTH CARE EDUCATION/TRAINING PROGRAM

## 2023-02-08 PROCEDURE — 80048 BASIC METABOLIC PNL TOTAL CA: CPT

## 2023-02-08 PROCEDURE — 80047 BASIC METABLC PNL IONIZED CA: CPT

## 2023-02-08 PROCEDURE — G0378 HOSPITAL OBSERVATION PER HR: HCPCS

## 2023-02-08 PROCEDURE — 93005 ELECTROCARDIOGRAM TRACING: CPT

## 2023-02-08 PROCEDURE — 85610 PROTHROMBIN TIME: CPT

## 2023-02-08 PROCEDURE — 74011000250 HC RX REV CODE- 250: Performed by: STUDENT IN AN ORGANIZED HEALTH CARE EDUCATION/TRAINING PROGRAM

## 2023-02-08 PROCEDURE — 77030019698 HC SYR ANGI MDLON MRTM -A: Performed by: STUDENT IN AN ORGANIZED HEALTH CARE EDUCATION/TRAINING PROGRAM

## 2023-02-08 PROCEDURE — 36415 COLL VENOUS BLD VENIPUNCTURE: CPT

## 2023-02-08 RX ORDER — SODIUM CHLORIDE 0.9 % (FLUSH) 0.9 %
5-40 SYRINGE (ML) INJECTION EVERY 8 HOURS
Status: DISCONTINUED | OUTPATIENT
Start: 2023-02-08 | End: 2023-02-09 | Stop reason: HOSPADM

## 2023-02-08 RX ORDER — LIDOCAINE HYDROCHLORIDE 10 MG/ML
INJECTION INFILTRATION; PERINEURAL AS NEEDED
Status: DISCONTINUED | OUTPATIENT
Start: 2023-02-08 | End: 2023-02-08 | Stop reason: HOSPADM

## 2023-02-08 RX ORDER — AMLODIPINE BESYLATE 5 MG/1
5 TABLET ORAL DAILY
Status: DISCONTINUED | OUTPATIENT
Start: 2023-02-09 | End: 2023-02-09 | Stop reason: HOSPADM

## 2023-02-08 RX ORDER — SODIUM CHLORIDE 9 MG/ML
50 INJECTION, SOLUTION INTRAVENOUS CONTINUOUS
Status: DISCONTINUED | OUTPATIENT
Start: 2023-02-08 | End: 2023-02-09 | Stop reason: HOSPADM

## 2023-02-08 RX ORDER — NITROGLYCERIN 5 MG/ML
INJECTION, SOLUTION INTRAVENOUS AS NEEDED
Status: DISCONTINUED | OUTPATIENT
Start: 2023-02-08 | End: 2023-02-08 | Stop reason: HOSPADM

## 2023-02-08 RX ORDER — ISOSORBIDE MONONITRATE 30 MG/1
30 TABLET, EXTENDED RELEASE ORAL DAILY
Status: DISCONTINUED | OUTPATIENT
Start: 2023-02-09 | End: 2023-02-09 | Stop reason: HOSPADM

## 2023-02-08 RX ORDER — ATENOLOL 50 MG/1
50 TABLET ORAL DAILY
Status: DISCONTINUED | OUTPATIENT
Start: 2023-02-09 | End: 2023-02-09 | Stop reason: HOSPADM

## 2023-02-08 RX ORDER — FENTANYL CITRATE 50 UG/ML
INJECTION, SOLUTION INTRAMUSCULAR; INTRAVENOUS AS NEEDED
Status: DISCONTINUED | OUTPATIENT
Start: 2023-02-08 | End: 2023-02-08 | Stop reason: HOSPADM

## 2023-02-08 RX ORDER — HEPARIN SODIUM 200 [USP'U]/100ML
INJECTION, SOLUTION INTRAVENOUS
Status: COMPLETED | OUTPATIENT
Start: 2023-02-08 | End: 2023-02-08

## 2023-02-08 RX ORDER — CLONIDINE HYDROCHLORIDE 0.1 MG/1
0.1 TABLET ORAL 2 TIMES DAILY
Status: DISCONTINUED | OUTPATIENT
Start: 2023-02-08 | End: 2023-02-09 | Stop reason: HOSPADM

## 2023-02-08 RX ORDER — INSULIN GLARGINE 100 [IU]/ML
15 INJECTION, SOLUTION SUBCUTANEOUS
Status: DISCONTINUED | OUTPATIENT
Start: 2023-02-08 | End: 2023-02-09 | Stop reason: HOSPADM

## 2023-02-08 RX ORDER — MIDAZOLAM HYDROCHLORIDE 1 MG/ML
INJECTION INTRAMUSCULAR; INTRAVENOUS AS NEEDED
Status: DISCONTINUED | OUTPATIENT
Start: 2023-02-08 | End: 2023-02-08 | Stop reason: HOSPADM

## 2023-02-08 RX ORDER — POTASSIUM CHLORIDE 20 MEQ/1
40 TABLET, EXTENDED RELEASE ORAL
Status: COMPLETED | OUTPATIENT
Start: 2023-02-08 | End: 2023-02-08

## 2023-02-08 RX ORDER — GLIPIZIDE 5 MG/1
5 TABLET ORAL DAILY
Status: DISCONTINUED | OUTPATIENT
Start: 2023-02-09 | End: 2023-02-09 | Stop reason: HOSPADM

## 2023-02-08 RX ORDER — HYDROCHLOROTHIAZIDE 25 MG/1
25 TABLET ORAL DAILY
Status: DISCONTINUED | OUTPATIENT
Start: 2023-02-09 | End: 2023-02-09 | Stop reason: HOSPADM

## 2023-02-08 RX ORDER — ASPIRIN 81 MG/1
81 TABLET ORAL DAILY
Status: DISCONTINUED | OUTPATIENT
Start: 2023-02-09 | End: 2023-02-09 | Stop reason: HOSPADM

## 2023-02-08 RX ORDER — ATORVASTATIN CALCIUM 40 MG/1
40 TABLET, FILM COATED ORAL DAILY
Status: DISCONTINUED | OUTPATIENT
Start: 2023-02-09 | End: 2023-02-09 | Stop reason: HOSPADM

## 2023-02-08 RX ORDER — SODIUM CHLORIDE 0.9 % (FLUSH) 0.9 %
5-40 SYRINGE (ML) INJECTION AS NEEDED
Status: DISCONTINUED | OUTPATIENT
Start: 2023-02-08 | End: 2023-02-09 | Stop reason: HOSPADM

## 2023-02-08 RX ORDER — INSULIN GLARGINE 100 [IU]/ML
15 INJECTION, SOLUTION SUBCUTANEOUS
Status: DISCONTINUED | OUTPATIENT
Start: 2023-02-08 | End: 2023-02-08

## 2023-02-08 RX ORDER — VERAPAMIL HYDROCHLORIDE 2.5 MG/ML
INJECTION, SOLUTION INTRAVENOUS AS NEEDED
Status: DISCONTINUED | OUTPATIENT
Start: 2023-02-08 | End: 2023-02-08 | Stop reason: HOSPADM

## 2023-02-08 RX ADMIN — POTASSIUM CHLORIDE 40 MEQ: 1500 TABLET, EXTENDED RELEASE ORAL at 21:27

## 2023-02-08 RX ADMIN — INSULIN GLARGINE 15 UNITS: 100 INJECTION, SOLUTION SUBCUTANEOUS at 21:27

## 2023-02-08 RX ADMIN — SODIUM CHLORIDE, PRESERVATIVE FREE 10 ML: 5 INJECTION INTRAVENOUS at 18:44

## 2023-02-08 RX ADMIN — POTASSIUM CHLORIDE 40 MEQ: 1500 TABLET, EXTENDED RELEASE ORAL at 18:43

## 2023-02-08 RX ADMIN — SODIUM CHLORIDE 50 ML/HR: 9 INJECTION, SOLUTION INTRAVENOUS at 09:47

## 2023-02-08 RX ADMIN — CLONIDINE HYDROCHLORIDE 0.1 MG: 0.1 TABLET ORAL at 21:27

## 2023-02-08 RX ADMIN — SODIUM CHLORIDE, PRESERVATIVE FREE 10 ML: 5 INJECTION INTRAVENOUS at 21:36

## 2023-02-08 NOTE — Clinical Note
TRANSFER - OUT REPORT:     Verbal report given to: Elvis Carrera, (at bedside). Report consisted of patient's Situation, Background, Assessment and   Recommendations(SBAR). Opportunity for questions and clarification was provided. Patient transported with a Registered Nurse. Patient transported to: recovery.

## 2023-02-08 NOTE — PROGRESS NOTES
Patient states okay to review and give discharge instructions to [facility] JEWELS PT'S DAUGHTER

## 2023-02-08 NOTE — PERIOP NOTES
TRANSFER - OUT REPORT:    Verbal report given to Nova RN(name) on Ngozi Aguilar  being transferred to 461(unit) for routine post - op       Report consisted of patients Situation, Background, Assessment and   Recommendations(SBAR). Information from the following report(s) SBAR, Procedure Summary, Intake/Output, and MAR was reviewed with the receiving nurse. Opportunity for questions and clarification was provided.       Patient transported with:   Monitor  Registered Nurse

## 2023-02-08 NOTE — Clinical Note
Contrast Dose Calculator:   Patient's age: 82.   Patient's sex: Female. Patient weight (kg) = 67.1. Creatinine level (mg/dL) = 1.27. Creatinine clearance (mL/min): 34.   Contrast concentration (mg/mL) = 370. MACD = 214.19 mL. Max Contrast dose per Creatinine Cl calculator = 76.5 mL.

## 2023-02-08 NOTE — PROGRESS NOTES
Problem: Falls - Risk of  Goal: *Absence of Falls  Description: Document Roderick Young Fall Risk and appropriate interventions in the flowsheet.   Outcome: Progressing Towards Goal  Note: Fall Risk Interventions:  Mobility Interventions: PT Consult for assist device competence              Elimination Interventions: Bed/chair exit alarm, Call light in reach, Patient to call for help with toileting needs    History of Falls Interventions: Bed/chair exit alarm

## 2023-02-08 NOTE — PROGRESS NOTES
PT arrived from PACU. PT is alert and oreitend and is resting in bed. PT vitals are WNL. PT Tband time to deflate. Pt -2mlsQ 15. Toatl mls 12. PT heart cath no intervention R side wrist.    Pt states she has had a previous fall prior to this admission in the past month at the grocery store. Stated she tripped over a box in the floor. She has been to see a dr and has some bone \"cracking\" in her shoulder from the fall. PT has some outpatient surgery scheduled. PT states she doesn't understand why she is having all the chest pain she been having.    PT eat an entire tray (lasagna and salad) and drank all her drink 140mls    Skin intact

## 2023-02-09 VITALS
HEART RATE: 53 BPM | SYSTOLIC BLOOD PRESSURE: 112 MMHG | OXYGEN SATURATION: 92 % | DIASTOLIC BLOOD PRESSURE: 58 MMHG | TEMPERATURE: 98.8 F | HEIGHT: 61 IN | BODY MASS INDEX: 29.15 KG/M2 | RESPIRATION RATE: 19 BRPM | WEIGHT: 154.4 LBS

## 2023-02-09 PROBLEM — I50.9 CHF (CONGESTIVE HEART FAILURE) (HCC): Status: RESOLVED | Noted: 2023-02-07 | Resolved: 2023-02-09

## 2023-02-09 LAB
ANION GAP SERPL CALC-SCNC: 5 MMOL/L (ref 5–15)
BUN SERPL-MCNC: 23 MG/DL (ref 6–20)
BUN/CREAT SERPL: 20 (ref 12–20)
CA-I BLD-MCNC: 9.8 MG/DL (ref 8.5–10.1)
CHLORIDE SERPL-SCNC: 106 MMOL/L (ref 97–108)
CO2 SERPL-SCNC: 27 MMOL/L (ref 21–32)
CREAT SERPL-MCNC: 1.15 MG/DL (ref 0.55–1.02)
ERYTHROCYTE [DISTWIDTH] IN BLOOD BY AUTOMATED COUNT: 12.6 % (ref 11.5–14.5)
GLUCOSE SERPL-MCNC: 134 MG/DL (ref 65–100)
HCT VFR BLD AUTO: 39.1 % (ref 35–47)
HGB BLD-MCNC: 13.1 G/DL (ref 11.5–16)
MCH RBC QN AUTO: 30.5 PG (ref 26–34)
MCHC RBC AUTO-ENTMCNC: 33.5 G/DL (ref 30–36.5)
MCV RBC AUTO: 90.9 FL (ref 80–99)
NRBC # BLD: 0 K/UL (ref 0–0.01)
NRBC BLD-RTO: 0 PER 100 WBC
PLATELET # BLD AUTO: 139 K/UL (ref 150–400)
PMV BLD AUTO: 11.8 FL (ref 8.9–12.9)
POTASSIUM SERPL-SCNC: 3.7 MMOL/L (ref 3.5–5.1)
RBC # BLD AUTO: 4.3 M/UL (ref 3.8–5.2)
SODIUM SERPL-SCNC: 138 MMOL/L (ref 136–145)
WBC # BLD AUTO: 8.2 K/UL (ref 3.6–11)

## 2023-02-09 PROCEDURE — 85027 COMPLETE CBC AUTOMATED: CPT

## 2023-02-09 PROCEDURE — 36415 COLL VENOUS BLD VENIPUNCTURE: CPT

## 2023-02-09 PROCEDURE — 74011000250 HC RX REV CODE- 250: Performed by: STUDENT IN AN ORGANIZED HEALTH CARE EDUCATION/TRAINING PROGRAM

## 2023-02-09 PROCEDURE — G0378 HOSPITAL OBSERVATION PER HR: HCPCS

## 2023-02-09 PROCEDURE — 74011250637 HC RX REV CODE- 250/637: Performed by: STUDENT IN AN ORGANIZED HEALTH CARE EDUCATION/TRAINING PROGRAM

## 2023-02-09 PROCEDURE — 80048 BASIC METABOLIC PNL TOTAL CA: CPT

## 2023-02-09 RX ADMIN — ASPIRIN 81 MG: 81 TABLET, COATED ORAL at 09:27

## 2023-02-09 RX ADMIN — HYDROCHLOROTHIAZIDE 25 MG: 25 TABLET ORAL at 09:27

## 2023-02-09 RX ADMIN — ATORVASTATIN CALCIUM 40 MG: 40 TABLET, FILM COATED ORAL at 09:27

## 2023-02-09 RX ADMIN — ISOSORBIDE MONONITRATE 30 MG: 30 TABLET, EXTENDED RELEASE ORAL at 09:27

## 2023-02-09 RX ADMIN — CLONIDINE HYDROCHLORIDE 0.1 MG: 0.1 TABLET ORAL at 09:27

## 2023-02-09 RX ADMIN — GLIPIZIDE 5 MG: 5 TABLET ORAL at 09:27

## 2023-02-09 RX ADMIN — SODIUM CHLORIDE, PRESERVATIVE FREE 10 ML: 5 INJECTION INTRAVENOUS at 05:36

## 2023-02-09 RX ADMIN — AMLODIPINE BESYLATE 5 MG: 5 TABLET ORAL at 09:27

## 2023-02-09 RX ADMIN — ATENOLOL 50 MG: 50 TABLET ORAL at 09:27

## 2023-02-09 NOTE — PROGRESS NOTES
Problem: Falls - Risk of  Goal: *Absence of Falls  Description: Document Ruiz Baum Fall Risk and appropriate interventions in the flowsheet. Outcome: Progressing Towards Goal  Note: Fall Risk Interventions:  Mobility Interventions: PT Consult for assist device competence              Elimination Interventions: Bed/chair exit alarm    History of Falls Interventions: Bed/chair exit alarm         Problem: Patient Education: Go to Patient Education Activity  Goal: Patient/Family Education  Outcome: Progressing Towards Goal     Problem: Pressure Injury - Risk of  Goal: *Prevention of pressure injury  Description: Document Prosper Scale and appropriate interventions in the flowsheet.   Outcome: Progressing Towards Goal  Note: Pressure Injury Interventions:                 Mobility Interventions: PT/OT evaluation, HOB 30 degrees or less                          Problem: Patient Education: Go to Patient Education Activity  Goal: Patient/Family Education  Outcome: Progressing Towards Goal

## 2023-02-09 NOTE — PROGRESS NOTES
Sent DR Damian Clark a message regarding family in the room asking to see . DR Damian Clark stated hes not here today to get intouch with Neomia Romberg. Tried to perfect Dr Neomia Romberg and he isn't on perfect serve. Called  and she gave me a call service number for the Dr during the weak. Called the call service number and she gave me the Dr cell phone number. Called the Dr cell phone, no answer and cant leave a message.   PT has no active orders for pain and is having pain in her shoulder and has family in the room waiting for the PT to [address] [facility]

## 2023-02-09 NOTE — DISCHARGE SUMMARY
Ms. Yesenia Pimentel is a 82year old female who presented 2/8 for an outpatient cardiac catheterization. Mild, nonobstructive coronary artery disease. Right radial access site dry and intact. No swelling note dto site and radial pluse strong, color and sensation intact. Plans to follow up in out office for 2 week follow up to discuss medical management. Continue current medications and doses.

## 2023-02-09 NOTE — PROGRESS NOTES
Pt is alert and oriented. Cath site looks clean with no drainage or hematoma. PT vitals all WNL  PT stated she slept well.

## 2023-02-09 NOTE — PROGRESS NOTES
Problem: Falls - Risk of  Goal: *Absence of Falls  Description: Document Florence Toth Fall Risk and appropriate interventions in the flowsheet.   Outcome: Progressing Towards Goal  Note: Fall Risk Interventions:  Mobility Interventions: PT Consult for assist device competence              Elimination Interventions: Bed/chair exit alarm    History of Falls Interventions: Bed/chair exit alarm         Problem: Patient Education: Go to Patient Education Activity  Goal: Patient/Family Education  Outcome: Progressing Towards Goal

## 2023-02-09 NOTE — PROGRESS NOTES
Medicare Outpatient Observation Notice (MOON)/ [place] Outpatient Observation Notice (Annita Dean) provided to patient/representative with verbal explanation of the notice. Time allotted for questions regarding the notice. Patient /representative provided a completed copy of the MOON/VOON notice. Copy placed on bedside chart.

## 2023-02-10 NOTE — H&P
History and Physical    83 y/o female with PMH HTN, HLD here for heart cath. RRR, Clear lungs, No edema   No severe stenosis seen. Staying overnight for observation.

## 2023-02-24 ENCOUNTER — APPOINTMENT (OUTPATIENT)
Age: 88
End: 2023-02-24
Payer: MEDICARE

## 2023-02-24 ENCOUNTER — HOSPITAL ENCOUNTER (OUTPATIENT)
Age: 88
Setting detail: OBSERVATION
Discharge: HOME | End: 2023-02-25
Attending: INTERNAL MEDICINE | Admitting: INTERNAL MEDICINE
Payer: MEDICARE

## 2023-02-24 DIAGNOSIS — N30.00 ACUTE CYSTITIS WITHOUT HEMATURIA: ICD-10-CM

## 2023-02-24 DIAGNOSIS — I63.532 STROKE DUE TO OCCLUSION OF LEFT POSTERIOR CEREBRAL ARTERY (HCC): ICD-10-CM

## 2023-02-24 DIAGNOSIS — R77.8 TROPONIN I ABOVE REFERENCE RANGE: ICD-10-CM

## 2023-02-24 DIAGNOSIS — E87.6 HYPOKALEMIA: ICD-10-CM

## 2023-02-24 DIAGNOSIS — I63.332 CEREBROVASCULAR ACCIDENT (CVA) DUE TO THROMBOSIS OF LEFT POSTERIOR CEREBRAL ARTERY (HCC): Primary | ICD-10-CM

## 2023-02-24 LAB
ALBUMIN SERPL-MCNC: 3.4 G/DL (ref 3.4–5)
ALBUMIN/GLOB SERPL: 1 (ref 0.8–1.7)
ALP SERPL-CCNC: 61 U/L (ref 45–117)
ALT SERPL-CCNC: 21 U/L (ref 13–56)
ANION GAP SERPL CALC-SCNC: 6 MMOL/L (ref 3–18)
APPEARANCE UR: ABNORMAL
AST SERPL W P-5'-P-CCNC: 21 U/L (ref 10–38)
BACTERIA URNS QL MICRO: ABNORMAL /HPF
BASOPHILS # BLD: 0.1 K/UL (ref 0–0.1)
BASOPHILS NFR BLD: 1 % (ref 0–2)
BILIRUB SERPL-MCNC: 0.7 MG/DL (ref 0.2–1)
BILIRUB UR QL: NEGATIVE
BNP SERPL-MCNC: 221 PG/ML (ref 0–1800)
BUN SERPL-MCNC: 21 MG/DL (ref 7–18)
BUN/CREAT SERPL: 20 (ref 12–20)
CA-I BLD-MCNC: 10 MG/DL (ref 8.5–10.1)
CHLORIDE SERPL-SCNC: 97 MMOL/L (ref 100–111)
CO2 SERPL-SCNC: 33 MMOL/L (ref 21–32)
COLOR UR: ABNORMAL
CREAT SERPL-MCNC: 1.07 MG/DL (ref 0.6–1.3)
DIFFERENTIAL METHOD BLD: ABNORMAL
EKG ATRIAL RATE: 53 BPM
EKG DIAGNOSIS: NORMAL
EKG P AXIS: -26 DEGREES
EKG P-R INTERVAL: 211 MS
EKG Q-T INTERVAL: 434 MS
EKG QRS DURATION: 101 MS
EKG QTC CALCULATION (BAZETT): 416 MS
EKG R AXIS: 2 DEGREES
EKG T AXIS: 15 DEGREES
EKG VENTRICULAR RATE: 55 BPM
EOSINOPHIL # BLD: 0.1 K/UL (ref 0–0.4)
EOSINOPHIL NFR BLD: 2 % (ref 0–5)
EPITH CASTS URNS QL MICRO: ABNORMAL /LPF (ref 0–20)
ERYTHROCYTE [DISTWIDTH] IN BLOOD BY AUTOMATED COUNT: 12.8 % (ref 11.6–14.5)
GLOBULIN SER CALC-MCNC: 3.5 G/DL (ref 2–4)
GLUCOSE BLD STRIP.AUTO-MCNC: 193 MG/DL (ref 70–110)
GLUCOSE BLD STRIP.AUTO-MCNC: 229 MG/DL (ref 70–110)
GLUCOSE SERPL-MCNC: 243 MG/DL (ref 74–99)
GLUCOSE UR STRIP.AUTO-MCNC: NEGATIVE MG/DL
HCT VFR BLD AUTO: 40.9 % (ref 35–45)
HGB BLD-MCNC: 13.9 G/DL (ref 12–16)
HGB UR QL STRIP: NEGATIVE
IMM GRANULOCYTES # BLD AUTO: 0 K/UL (ref 0–0.04)
IMM GRANULOCYTES NFR BLD AUTO: 0 % (ref 0–0.5)
KETONES UR QL STRIP.AUTO: NEGATIVE MG/DL
LEUKOCYTE ESTERASE UR QL STRIP.AUTO: ABNORMAL
LYMPHOCYTES # BLD: 2.2 K/UL (ref 0.9–3.6)
LYMPHOCYTES NFR BLD: 33 % (ref 21–52)
MCH RBC QN AUTO: 31.4 PG (ref 24–34)
MCHC RBC AUTO-ENTMCNC: 34 G/DL (ref 31–37)
MCV RBC AUTO: 92.3 FL (ref 78–100)
MONOCYTES # BLD: 0.6 K/UL (ref 0.05–1.2)
MONOCYTES NFR BLD: 8 % (ref 3–10)
NEUTS SEG # BLD: 3.7 K/UL (ref 1.8–8)
NEUTS SEG NFR BLD: 56 % (ref 40–73)
NITRITE UR QL STRIP.AUTO: NEGATIVE
NRBC # BLD: 0 K/UL (ref 0–0.01)
NRBC BLD-RTO: 0 PER 100 WBC
PERFORMED BY:: ABNORMAL
PERFORMED BY:: ABNORMAL
PH UR STRIP: 7 (ref 5–9)
PLATELET # BLD AUTO: 118 K/UL (ref 135–420)
PMV BLD AUTO: 12.1 FL (ref 9.2–11.8)
POTASSIUM SERPL-SCNC: 2.9 MMOL/L (ref 3.5–5.5)
PROT SERPL-MCNC: 6.9 G/DL (ref 6.4–8.2)
PROT UR STRIP-MCNC: NEGATIVE MG/DL
RBC # BLD AUTO: 4.43 M/UL (ref 4.2–5.3)
RBC #/AREA URNS HPF: ABNORMAL /HPF (ref 0–2)
SODIUM SERPL-SCNC: 136 MMOL/L (ref 136–145)
SP GR UR REFRACTOMETRY: 1.01 (ref 1–1.03)
TROPONIN I SERPL HS-MCNC: 90 NG/L (ref 0–54)
TROPONIN I SERPL HS-MCNC: 94 NG/L (ref 0–54)
UROBILINOGEN UR QL STRIP.AUTO: 0.2 EU/DL (ref 0.2–1)
WBC # BLD AUTO: 6.7 K/UL (ref 4.6–13.2)
WBC URNS QL MICRO: ABNORMAL /HPF (ref 0–4)

## 2023-02-24 PROCEDURE — 85025 COMPLETE CBC W/AUTO DIFF WBC: CPT

## 2023-02-24 PROCEDURE — G0378 HOSPITAL OBSERVATION PER HR: HCPCS

## 2023-02-24 PROCEDURE — 83880 ASSAY OF NATRIURETIC PEPTIDE: CPT

## 2023-02-24 PROCEDURE — 6370000000 HC RX 637 (ALT 250 FOR IP): Performed by: INTERNAL MEDICINE

## 2023-02-24 PROCEDURE — 87186 SC STD MICRODIL/AGAR DIL: CPT

## 2023-02-24 PROCEDURE — 82962 GLUCOSE BLOOD TEST: CPT

## 2023-02-24 PROCEDURE — 87086 URINE CULTURE/COLONY COUNT: CPT

## 2023-02-24 PROCEDURE — 80053 COMPREHEN METABOLIC PANEL: CPT

## 2023-02-24 PROCEDURE — 93005 ELECTROCARDIOGRAM TRACING: CPT | Performed by: INTERNAL MEDICINE

## 2023-02-24 PROCEDURE — 36415 COLL VENOUS BLD VENIPUNCTURE: CPT

## 2023-02-24 PROCEDURE — 70551 MRI BRAIN STEM W/O DYE: CPT

## 2023-02-24 PROCEDURE — 87077 CULTURE AEROBIC IDENTIFY: CPT

## 2023-02-24 PROCEDURE — 70450 CT HEAD/BRAIN W/O DYE: CPT

## 2023-02-24 PROCEDURE — 84484 ASSAY OF TROPONIN QUANT: CPT

## 2023-02-24 PROCEDURE — 6370000000 HC RX 637 (ALT 250 FOR IP)

## 2023-02-24 PROCEDURE — 99285 EMERGENCY DEPT VISIT HI MDM: CPT

## 2023-02-24 PROCEDURE — 70544 MR ANGIOGRAPHY HEAD W/O DYE: CPT

## 2023-02-24 PROCEDURE — 81001 URINALYSIS AUTO W/SCOPE: CPT

## 2023-02-24 PROCEDURE — 6370000000 HC RX 637 (ALT 250 FOR IP): Performed by: NURSE PRACTITIONER

## 2023-02-24 RX ORDER — LORAZEPAM 0.5 MG/1
TABLET ORAL
Status: COMPLETED
Start: 2023-02-24 | End: 2023-02-24

## 2023-02-24 RX ORDER — ATENOLOL 25 MG/1
50 TABLET ORAL DAILY
Status: DISCONTINUED | OUTPATIENT
Start: 2023-02-24 | End: 2023-02-25 | Stop reason: HOSPADM

## 2023-02-24 RX ORDER — ONDANSETRON 2 MG/ML
4 INJECTION INTRAMUSCULAR; INTRAVENOUS EVERY 6 HOURS PRN
Status: DISCONTINUED | OUTPATIENT
Start: 2023-02-24 | End: 2023-02-25 | Stop reason: HOSPADM

## 2023-02-24 RX ORDER — ENOXAPARIN SODIUM 100 MG/ML
40 INJECTION SUBCUTANEOUS DAILY
Status: DISCONTINUED | OUTPATIENT
Start: 2023-02-25 | End: 2023-02-24

## 2023-02-24 RX ORDER — ACETAMINOPHEN 325 MG/1
650 TABLET ORAL EVERY 4 HOURS PRN
Status: DISCONTINUED | OUTPATIENT
Start: 2023-02-24 | End: 2023-02-25 | Stop reason: HOSPADM

## 2023-02-24 RX ORDER — ASPIRIN 81 MG/1
81 TABLET ORAL DAILY
COMMUNITY

## 2023-02-24 RX ORDER — LORAZEPAM 0.5 MG/1
0.5 TABLET ORAL ONCE
Status: COMPLETED | OUTPATIENT
Start: 2023-02-24 | End: 2023-02-24

## 2023-02-24 RX ORDER — POLYETHYLENE GLYCOL 3350 17 G/17G
17 POWDER, FOR SOLUTION ORAL DAILY PRN
Status: DISCONTINUED | OUTPATIENT
Start: 2023-02-24 | End: 2023-02-25 | Stop reason: HOSPADM

## 2023-02-24 RX ORDER — INSULIN LISPRO 100 [IU]/ML
0-8 INJECTION, SOLUTION INTRAVENOUS; SUBCUTANEOUS
Status: DISCONTINUED | OUTPATIENT
Start: 2023-02-25 | End: 2023-02-25 | Stop reason: HOSPADM

## 2023-02-24 RX ORDER — POTASSIUM CHLORIDE 750 MG/1
40 TABLET, EXTENDED RELEASE ORAL ONCE
Status: COMPLETED | OUTPATIENT
Start: 2023-02-24 | End: 2023-02-24

## 2023-02-24 RX ORDER — AMLODIPINE BESYLATE 5 MG/1
10 TABLET ORAL DAILY
Status: DISCONTINUED | OUTPATIENT
Start: 2023-02-25 | End: 2023-02-25

## 2023-02-24 RX ORDER — ASPIRIN 325 MG
325 TABLET ORAL ONCE
Status: COMPLETED | OUTPATIENT
Start: 2023-02-24 | End: 2023-02-24

## 2023-02-24 RX ORDER — ATORVASTATIN CALCIUM 40 MG/1
80 TABLET, FILM COATED ORAL NIGHTLY
Status: DISCONTINUED | OUTPATIENT
Start: 2023-02-24 | End: 2023-02-25 | Stop reason: HOSPADM

## 2023-02-24 RX ORDER — HYDRALAZINE HYDROCHLORIDE 20 MG/ML
10 INJECTION INTRAMUSCULAR; INTRAVENOUS EVERY 6 HOURS PRN
Status: DISCONTINUED | OUTPATIENT
Start: 2023-02-24 | End: 2023-02-25 | Stop reason: HOSPADM

## 2023-02-24 RX ORDER — ONDANSETRON 4 MG/1
4 TABLET, ORALLY DISINTEGRATING ORAL EVERY 8 HOURS PRN
Status: DISCONTINUED | OUTPATIENT
Start: 2023-02-24 | End: 2023-02-25 | Stop reason: HOSPADM

## 2023-02-24 RX ORDER — CLONIDINE HYDROCHLORIDE 0.1 MG/1
0.1 TABLET ORAL 2 TIMES DAILY
Status: DISCONTINUED | OUTPATIENT
Start: 2023-02-24 | End: 2023-02-25 | Stop reason: HOSPADM

## 2023-02-24 RX ORDER — ISOSORBIDE MONONITRATE 30 MG/1
30 TABLET, EXTENDED RELEASE ORAL DAILY
COMMUNITY

## 2023-02-24 RX ORDER — LORAZEPAM 2 MG/ML
1 INJECTION INTRAMUSCULAR ONCE
Status: DISCONTINUED | OUTPATIENT
Start: 2023-02-24 | End: 2023-02-24

## 2023-02-24 RX ORDER — CLONIDINE HYDROCHLORIDE 0.1 MG/1
0.1 TABLET ORAL 2 TIMES DAILY
COMMUNITY

## 2023-02-24 RX ORDER — ATORVASTATIN CALCIUM 40 MG/1
40 TABLET, FILM COATED ORAL DAILY
Status: DISCONTINUED | OUTPATIENT
Start: 2023-02-24 | End: 2023-02-24

## 2023-02-24 RX ORDER — HYDROCHLOROTHIAZIDE 25 MG/1
25 TABLET ORAL DAILY
Status: DISCONTINUED | OUTPATIENT
Start: 2023-02-24 | End: 2023-02-25 | Stop reason: HOSPADM

## 2023-02-24 RX ORDER — INSULIN LISPRO 100 [IU]/ML
0-4 INJECTION, SOLUTION INTRAVENOUS; SUBCUTANEOUS NIGHTLY
Status: DISCONTINUED | OUTPATIENT
Start: 2023-02-24 | End: 2023-02-25 | Stop reason: HOSPADM

## 2023-02-24 RX ORDER — AMLODIPINE BESYLATE 5 MG/1
5 TABLET ORAL DAILY
Status: DISCONTINUED | OUTPATIENT
Start: 2023-02-24 | End: 2023-02-24

## 2023-02-24 RX ORDER — DEXTROSE MONOHYDRATE 100 MG/ML
INJECTION, SOLUTION INTRAVENOUS CONTINUOUS PRN
Status: DISCONTINUED | OUTPATIENT
Start: 2023-02-24 | End: 2023-02-25 | Stop reason: HOSPADM

## 2023-02-24 RX ORDER — ENOXAPARIN SODIUM 100 MG/ML
40 INJECTION SUBCUTANEOUS DAILY
Status: DISCONTINUED | OUTPATIENT
Start: 2023-02-25 | End: 2023-02-25 | Stop reason: HOSPADM

## 2023-02-24 RX ORDER — ASPIRIN 81 MG/1
81 TABLET ORAL DAILY
Status: DISCONTINUED | OUTPATIENT
Start: 2023-02-25 | End: 2023-02-25 | Stop reason: HOSPADM

## 2023-02-24 RX ADMIN — ATENOLOL 50 MG: 25 TABLET ORAL at 23:18

## 2023-02-24 RX ADMIN — ATORVASTATIN CALCIUM 80 MG: 40 TABLET, FILM COATED ORAL at 23:17

## 2023-02-24 RX ADMIN — HYDROCHLOROTHIAZIDE 25 MG: 25 TABLET ORAL at 23:20

## 2023-02-24 RX ADMIN — CLONIDINE HYDROCHLORIDE 0.1 MG: 0.1 TABLET ORAL at 23:17

## 2023-02-24 RX ADMIN — LORAZEPAM 0.5 MG: 0.5 TABLET ORAL at 16:10

## 2023-02-24 RX ADMIN — POTASSIUM CHLORIDE 40 MEQ: 750 TABLET, EXTENDED RELEASE ORAL at 18:06

## 2023-02-24 RX ADMIN — ASPIRIN 325 MG ORAL TABLET 325 MG: 325 PILL ORAL at 23:17

## 2023-02-24 ASSESSMENT — PAIN SCALES - GENERAL
PAINLEVEL_OUTOF10: 0
PAINLEVEL_OUTOF10: 5
PAINLEVEL_OUTOF10: 0
PAINLEVEL_OUTOF10: 0

## 2023-02-24 ASSESSMENT — ENCOUNTER SYMPTOMS
SORE THROAT: 0
WHEEZING: 0
ABDOMINAL PAIN: 0
SHORTNESS OF BREATH: 0
RHINORRHEA: 0
EYE REDNESS: 0
CHEST TIGHTNESS: 0
COUGH: 0
VOMITING: 0
CONSTIPATION: 0
NAUSEA: 0
DIARRHEA: 0

## 2023-02-24 ASSESSMENT — LIFESTYLE VARIABLES
HOW OFTEN DO YOU HAVE A DRINK CONTAINING ALCOHOL: NEVER
HOW MANY STANDARD DRINKS CONTAINING ALCOHOL DO YOU HAVE ON A TYPICAL DAY: PATIENT DOES NOT DRINK

## 2023-02-24 ASSESSMENT — PAIN DESCRIPTION - ORIENTATION: ORIENTATION: LEFT

## 2023-02-24 ASSESSMENT — PAIN - FUNCTIONAL ASSESSMENT
PAIN_FUNCTIONAL_ASSESSMENT: 0-10
PAIN_FUNCTIONAL_ASSESSMENT: 0-10
PAIN_FUNCTIONAL_ASSESSMENT: NONE - DENIES PAIN

## 2023-02-24 ASSESSMENT — PAIN DESCRIPTION - LOCATION: LOCATION: CHEST

## 2023-02-24 NOTE — ED TRIAGE NOTES
Patient presents with c/o dizziness that began on awakening this morning. She denies eating prior to onset of dizziness. She states awakening at 0800 today.

## 2023-02-24 NOTE — ED PROVIDER NOTES
[facility] EMERGENCY DEPT  EMERGENCY DEPARTMENT ENCOUNTER      Pt Name: Rachel Downs  MRN: [id number]  [place] 1935  Date of evaluation: 2/24/2023  Provider: Leeroy Edwards MD    [address] [place] [address]       Chief Complaint   Patient presents with    Dizziness    Chest Pain         HISTORY OF PRESENT ILLNESS   (Location/Symptom, Timing/Onset, Context/Setting, Quality, Duration, Modifying Factors, Severity)  Note limiting factors. Rachel Downs is a 82 y.o. female who presents to the emergency department      83 yo wf that states that she woke up today; dizzy; everything spinning around; poor balance. Took her glu: 145 and bp: 136/72 and rested but later still had the dizziness; worse when she would try to get up and move around and not when laying still. Denies headache; diplopia; dysarthria; dysphagia; extremity weakness. States that she also had a left upper pectoral sharp pain since this am. States that she has chest pain 2/8/23 but she had cardiac cath and had mild non obstructive CAD; states that  her chest pain resolved after this but that  for the last 3 weeks, she has been getting this left pectoral chest pain that is worse when she lays on the left side. Today, she also felt palpitations. She states to nausea; no vomiting; no diaphoresis. The history is provided by the patient. Nursing Notes were reviewed. REVIEW OF SYSTEMS    (2-9 systems for level 4, 10 or more for level 5)     Review of Systems   Constitutional:  Negative for chills and fever. HENT:  Negative for congestion, rhinorrhea and sore throat. Eyes:  Negative for redness. Respiratory:  Negative for cough, shortness of breath and wheezing. Cardiovascular:  Negative for chest pain and palpitations. Gastrointestinal:  Negative for abdominal pain, diarrhea, nausea and vomiting. Genitourinary:  Negative for dysuria, flank pain and frequency. Musculoskeletal:  Negative for arthralgias and myalgias.    Skin:  Negative for rash.   Neurological:  Positive for dizziness. Negative for weakness and headaches. Psychiatric/Behavioral:  Negative for confusion. Except as noted above the remainder of the review of systems was reviewed and negative. PAST MEDICAL HISTORY     Past Medical History:   Diagnosis Date    Cataract     CHF (congestive heart failure) ([facility]     Claustrophobia     Cro[facility]'s disease ([facility]     CVA (cerebral vascular accident) ([facility]     DM (diabetes mellitus) ([facility]     High cholesterol     HTN (hypertension)     Hypertensive emergency     Hypokalemia     Intractable headache     Ruptured ear drum     Syncope     Unsteady gait     UTI (urinary tract infection)          SURGICAL HISTORY       Past Surgical History:   Procedure Laterality Date    CHOLECYSTECTOMY      HYSTERECTOMY (CERVIX STATUS UNKNOWN)      THORACOTOMY           CURRENT MEDICATIONS       Previous Medications    AMLODIPINE (NORVASC) 5 MG TABLET    Take 5 mg by mouth daily    ASPIRIN 81 MG EC TABLET    Take 81 mg by mouth daily    ATENOLOL (TENORMIN) 50 MG TABLET    Take 50 mg by mouth daily    ATORVASTATIN (LIPITOR) 40 MG TABLET    Take 40 mg by mouth daily    CLONIDINE (CATAPRES) 0.1 MG TABLET    Take 0.1 mg by mouth 2 times daily    GLIMEPIRIDE (AMARYL) 2 MG TABLET    Take 2 mg by mouth daily    HYDROCHLOROTHIAZIDE (HYDRODIURIL) 25 MG TABLET    Take 25 mg by mouth daily    INSULIN GLARGINE (LANTUS SOLOSTAR) 100 UNIT/ML INJECTION PEN    Inject 15 Units into the skin    ISOSORBIDE MONONITRATE (IMDUR) 30 MG EXTENDED RELEASE TABLET    Take 30 mg by mouth daily       ALLERGIES     Latex and Meperidine    FAMILY HISTORY     No family history on file. SOCIAL HISTORY       Social History     Socioeconomic History    Marital status:        SCREENINGS   NIH Stroke Scale  Interval: Baseline  Level of Consciousness (1a): Alert  LOC Questions (1b):  Answers both correctly  LOC Commands (1c): Performs both tasks correctly  Best Gaze (2): Normal  Visual (3): No visual loss  Facial Palsy (4): Normal symmetrical movement  Motor Arm, Left (5a): No drift  Motor Arm, Right (5b): No drift  Motor Leg, Left (6a): No drift  Motor Leg, Right (6b): No drift  Limb Ataxia (7): Absent  Sensory (8): Normal  Best Language (9): No aphasia  Dysarthria (10): Normal  Extinction and Inattention (11): No abnormality  Total: 0     [place] Coma Scale  Eye Opening: Spontaneous  Best Verbal Response: Oriented  Best Motor Response: Obeys commands  [place] Coma Scale Score: 15                     CIWA Assessment  BP: (!) 145/79  Heart Rate: 56                 PHYSICAL EXAM    (up to 7 for level 4, 8 or more for level 5)     ED Triage Vitals [02/24/23 1506]   BP Temp Temp Source Heart Rate Resp SpO2 Height Weight   (!) 167/75 97.9 °F (36.6 °C) Oral 58 26 97 % 5' (1.524 m) 156 lb (70.8 kg)       Physical Exam  Vitals and nursing note reviewed. Constitutional:       General: She is not in acute distress. Appearance: Normal appearance. She is not ill-appearing or toxic-appearing. HENT:      Mouth/Throat:      Pharynx: Oropharynx is clear. Eyes:      Extraocular Movements: Extraocular movements intact. Conjunctiva/sclera: Conjunctivae normal.      Comments: No nystagmus noted and no eye skew   Cardiovascular:      Rate and Rhythm: Regular rhythm. Heart sounds: Normal heart sounds. No murmur heard. Pulmonary:      Effort: Pulmonary effort is normal. No respiratory distress. Breath sounds: Normal breath sounds. No wheezing or rales. Abdominal:      General: There is no distension. Palpations: Abdomen is soft. Tenderness: There is no abdominal tenderness. Musculoskeletal:         General: Normal range of motion. Cervical back: Neck supple. Skin:     General: Skin is warm. Neurological:      Mental Status: She is alert and oriented to person, place, and time. Cranial Nerves: No cranial nerve deficit. Sensory: No sensory deficit. Motor: No weakness. Comments: Able to walk with assistance; slightly off balance   Psychiatric:         Behavior: Behavior normal.       DIAGNOSTIC RESULTS     EKG: All EKG's are interpreted by the Emergency Department Physician who either signs or Co-signs this chart in the absence of a cardiologist.    EKG read by Dr Haim Bhagat: 1500 rhythm 55/min, normal MA, normal QRS, normal axis, normal QTc. Sinus pericardia, nonspecific ST-T wave changes. RADIOLOGY:   Non-plain film images such as CT, Ultrasound and MRI are read by the radiologist. Plain radiographic images are visualized and preliminarily interpreted by the emergency physician with the below findings:        Interpretation per the Radiologist below, if available at the time of this note:    MRI BRAIN WO CONTRAST   Final Result   1. Small late acute versus subacute infarct in the posterior left parietal lobe   subcortical white matter. 2. Moderate chronic microvascular ischemic disease. No cerebellar infarct. 3. Normal MR angiography of the head. MRA HEAD WO CONTRAST   Final Result   1. Small late acute versus subacute infarct in the posterior left parietal lobe   subcortical white matter. 2. Moderate chronic microvascular ischemic disease. No cerebellar infarct. 3. Normal MR angiography of the head. CT HEAD WO CONTRAST   Final Result   Nonspecific white matter disease. No evidence for acute intracranial hemorrhage.             ED BEDSIDE ULTRASOUND:   Performed by ED Physician - none    LABS:  Labs Reviewed   CBC WITH AUTO DIFFERENTIAL - Abnormal; Notable for the following components:       Result Value    Platelets 715 (*)     MPV 12.1 (*)     All other components within normal limits   COMPREHENSIVE METABOLIC PANEL - Abnormal; Notable for the following components:    Potassium 2.9 (*)     Chloride 97 (*)     CO2 33 (*)     Glucose 243 (*)     BUN 21 (*)     Est, Glom Filt Rate 51 (*)     All other components within normal limits   TROPONIN - Abnormal; Notable for the following components:    Troponin, High Sensitivity 90 (*)     All other components within normal limits   URINALYSIS - Abnormal; Notable for the following components:    Appearance Hazy (*)     Leukocyte Esterase, Urine Moderate (*)     All other components within normal limits   URINALYSIS, MICRO - Abnormal; Notable for the following components:    BACTERIA, URINE 2+ (*)     All other components within normal limits   POCT GLUCOSE - Abnormal; Notable for the following components:    POC Glucose 229 (*)     All other components within normal limits   CULTURE, URINE   BRAIN NATRIURETIC PEPTIDE   TROPONIN       All other labs were within normal range or not returned as of this dictation. EMERGENCY DEPARTMENT COURSE and DIFFERENTIAL DIAGNOSIS/MDM:   Vitals:    Vitals:    02/24/23 1506 02/24/23 1809 02/24/23 1829   BP: (!) 167/75 (!) 170/79 (!) 145/79   Pulse: 58 58 56   Resp: 26 25 17   Temp: 97.9 °F (36.6 °C)  97.7 °F (36.5 °C)   TempSrc: Oral  Oral   SpO2: 97% 97% 96%   Weight: 156 lb (70.8 kg)     Height: 5' (1.524 m)             Medical Decision Making  Amount and/or Complexity of Data Reviewed  Labs: ordered. Radiology: ordered. ECG/medicine tests: ordered. Risk  Prescription drug management. Decision regarding hospitalization. 81 yo wf with dizziness; difficult to ascertain possible post circulation stroke vs vertigo but symptoms favor BPV. Also has complicating chest pain that she has had for the past 3 weeks and palpitations today conjuring possible embolic CNS stroke. Will get MRA for better evaluation of both. NOTE: Case discussed with NP Alex who agrees to admit pt. Recommends teleneuro    REASSESSMENT    7:19 PM  Pt stable and in NAD; eating dinner; findings discussed      CRITICAL CARE TIME   Total Critical Care time was 45 minutes, excluding separately reportable procedures.   There was a high probability of clinically significant/life threatening deterioration in the patient's condition which required my urgent intervention. Dr Shivani Chan    CONSULTS:  Payton Crowder:  Unless otherwise noted below, none     Procedures    FINAL IMPRESSION      1. Cerebrovascular accident (CVA) due to thrombosis of left posterior cerebral artery ([address]    2. Acute cystitis without hematuria    3. Troponin I above reference range    4. Hypokalemia          DISPOSITION/PLAN   DISPOSITION Admitted 02/24/2023 07:12:50 PM      PATIENT REFERRED TO:  No follow-up provider specified. DISCHARGE MEDICATIONS:  New Prescriptions    No medications on file     Controlled Substances Monitoring:     No flowsheet data found.     (Please note that portions of this note were completed with a voice recognition program.  Efforts were made to edit the dictations but occasionally words are mis-transcribed.)    Lonnie Lauar MD (electronically signed)  Attending Emergency Physician           Lonnie Laura MD  02/24/23 1919

## 2023-02-25 VITALS
BODY MASS INDEX: 29.09 KG/M2 | WEIGHT: 154.1 LBS | SYSTOLIC BLOOD PRESSURE: 143 MMHG | HEIGHT: 61 IN | TEMPERATURE: 97.6 F | DIASTOLIC BLOOD PRESSURE: 71 MMHG | HEART RATE: 65 BPM | RESPIRATION RATE: 18 BRPM | OXYGEN SATURATION: 96 %

## 2023-02-25 LAB
ANION GAP SERPL CALC-SCNC: 6 MMOL/L (ref 3–18)
BUN SERPL-MCNC: 17 MG/DL (ref 7–18)
BUN/CREAT SERPL: 18 (ref 12–20)
CA-I BLD-MCNC: 9.9 MG/DL (ref 8.5–10.1)
CHLORIDE SERPL-SCNC: 102 MMOL/L (ref 100–111)
CHOLEST SERPL-MCNC: 123 MG/DL
CO2 SERPL-SCNC: 32 MMOL/L (ref 21–32)
CREAT SERPL-MCNC: 0.95 MG/DL (ref 0.6–1.3)
ERYTHROCYTE [DISTWIDTH] IN BLOOD BY AUTOMATED COUNT: 12.7 % (ref 11.6–14.5)
GLUCOSE BLD STRIP.AUTO-MCNC: 150 MG/DL (ref 70–110)
GLUCOSE BLD STRIP.AUTO-MCNC: 333 MG/DL (ref 70–110)
GLUCOSE SERPL-MCNC: 141 MG/DL (ref 74–99)
HCT VFR BLD AUTO: 40.1 % (ref 35–45)
HDLC SERPL-MCNC: 61 MG/DL (ref 40–60)
HDLC SERPL: 2 (ref 0–5)
HGB BLD-MCNC: 13.7 G/DL (ref 12–16)
LDLC SERPL CALC-MCNC: 28.2 MG/DL (ref 0–100)
LIPID PANEL: ABNORMAL
MCH RBC QN AUTO: 31.6 PG (ref 24–34)
MCHC RBC AUTO-ENTMCNC: 34.2 G/DL (ref 31–37)
MCV RBC AUTO: 92.4 FL (ref 78–100)
NRBC # BLD: 0 K/UL (ref 0–0.01)
NRBC BLD-RTO: 0 PER 100 WBC
PERFORMED BY:: ABNORMAL
PERFORMED BY:: ABNORMAL
PLATELET # BLD AUTO: 113 K/UL (ref 135–420)
PMV BLD AUTO: 12.6 FL (ref 9.2–11.8)
POTASSIUM SERPL-SCNC: 3.3 MMOL/L (ref 3.5–5.5)
RBC # BLD AUTO: 4.34 M/UL (ref 4.2–5.3)
SODIUM SERPL-SCNC: 140 MMOL/L (ref 136–145)
TRIGL SERPL-MCNC: 169 MG/DL
VLDLC SERPL CALC-MCNC: 33.8 MG/DL
WBC # BLD AUTO: 6.1 K/UL (ref 4.6–13.2)

## 2023-02-25 PROCEDURE — G0378 HOSPITAL OBSERVATION PER HR: HCPCS

## 2023-02-25 PROCEDURE — 2580000003 HC RX 258: Performed by: INTERNAL MEDICINE

## 2023-02-25 PROCEDURE — 82962 GLUCOSE BLOOD TEST: CPT

## 2023-02-25 PROCEDURE — 80048 BASIC METABOLIC PNL TOTAL CA: CPT

## 2023-02-25 PROCEDURE — 97162 PT EVAL MOD COMPLEX 30 MIN: CPT

## 2023-02-25 PROCEDURE — 36415 COLL VENOUS BLD VENIPUNCTURE: CPT

## 2023-02-25 PROCEDURE — 6370000000 HC RX 637 (ALT 250 FOR IP): Performed by: NURSE PRACTITIONER

## 2023-02-25 PROCEDURE — 80061 LIPID PANEL: CPT

## 2023-02-25 PROCEDURE — 85027 COMPLETE CBC AUTOMATED: CPT

## 2023-02-25 PROCEDURE — 6360000002 HC RX W HCPCS: Performed by: NURSE PRACTITIONER

## 2023-02-25 PROCEDURE — 96372 THER/PROPH/DIAG INJ SC/IM: CPT

## 2023-02-25 PROCEDURE — 6370000000 HC RX 637 (ALT 250 FOR IP): Performed by: INTERNAL MEDICINE

## 2023-02-25 RX ORDER — POTASSIUM CHLORIDE 750 MG/1
40 TABLET, EXTENDED RELEASE ORAL DAILY
Status: DISCONTINUED | OUTPATIENT
Start: 2023-02-25 | End: 2023-02-25 | Stop reason: HOSPADM

## 2023-02-25 RX ORDER — SODIUM CHLORIDE 9 MG/ML
INJECTION, SOLUTION INTRAVENOUS CONTINUOUS
Status: DISCONTINUED | OUTPATIENT
Start: 2023-02-25 | End: 2023-02-25 | Stop reason: HOSPADM

## 2023-02-25 RX ADMIN — SODIUM CHLORIDE: 9 INJECTION, SOLUTION INTRAVENOUS at 09:26

## 2023-02-25 RX ADMIN — ENOXAPARIN SODIUM 40 MG: 100 INJECTION SUBCUTANEOUS at 09:15

## 2023-02-25 RX ADMIN — POTASSIUM CHLORIDE 40 MEQ: 750 TABLET, EXTENDED RELEASE ORAL at 09:33

## 2023-02-25 RX ADMIN — CLONIDINE HYDROCHLORIDE 0.1 MG: 0.1 TABLET ORAL at 09:16

## 2023-02-25 RX ADMIN — INSULIN LISPRO 6 UNITS: 100 INJECTION, SOLUTION INTRAVENOUS; SUBCUTANEOUS at 11:59

## 2023-02-25 RX ADMIN — HYDROCHLOROTHIAZIDE 25 MG: 25 TABLET ORAL at 09:16

## 2023-02-25 RX ADMIN — ATENOLOL 50 MG: 25 TABLET ORAL at 09:16

## 2023-02-25 RX ADMIN — ASPIRIN 81 MG: 81 TABLET, COATED ORAL at 09:16

## 2023-02-25 ASSESSMENT — PAIN SCALES - GENERAL
PAINLEVEL_OUTOF10: 0
PAINLEVEL_OUTOF10: 0

## 2023-02-25 NOTE — PROGRESS NOTES
0700- Assumed care of Ms. Andrade from off going nurse. Bedside report received. She is currently resting in bed. Denies pain, voices no other concerns. Call bell within reach. All needs have currently been met. Patient's most recent vital signs:  Blood pressure (!) 143/71, pulse 65, temperature 97.6 °F (36.4 °C), temperature source Temporal, resp. rate 18, height 5' 1\" (1.549 m), weight 154 lb 1.6 oz (69.9 kg), SpO2 96 %. 6497- patient assisted to bedside commode with minimal assistance. 1207- Discharge paperwork given, all questions answered. 1230- Patient wheeled down to personal vehicle via wheelchair.

## 2023-02-25 NOTE — CARE COORDINATION
Advance Care Planning     General Advance Care Planning (ACP) Conversation    Date of Conversation: 2/24/2023  Conducted with: Patient with Decision Making Capacity    Healthcare Decision Maker:  No healthcare decision makers have been documented. Click here to complete [address] including selection of the Healthcare Decision Maker Relationship (ie \"Primary\")   Today we documented Decision Maker(s). The patient will provide ACP documents.     Content/Action Overview:  Has NO ACP documents/care preferences - requested patient complete ACP documents  Reviewed DNR/DNI and patient elects Full Code (Attempt Resuscitation)  treatment goals      Length of Voluntary ACP Conversation in minutes:  <16 minutes (Non-Billable)    Keegan Matute, RN

## 2023-02-25 NOTE — ED NOTES
Verbal shift change report given to [address] [place] [address] (oncoming nurse) by Oralia Alaniz RN (offgoing nurse). Report included the following information ED SBAR, Intake/Output, MAR, Recent Results and Cardiac Rhythm SB. Nursing staff aware that patient's dizziness symptoms have been present x one week intermittently.         Terri Anderson RN  02/24/23 1910

## 2023-02-25 NOTE — ED NOTES
TRANSFER - OUT REPORT:    Verbal report given to DICK Yeung  on Radha Ibrahim  being transferred to [address] [place] [address]  for routine progression of patient care       Report consisted of patient's Situation, Background, Assessment and   Recommendations(SBAR). Information from the following report(s) Nurse Handoff Report, ED Encounter Summary, MAR, and Recent Results was reviewed with the receiving nurse. Chamberlain Assessment: Presents to emergency department  because of falls (Syncope, seizure, or loss of consciousness): No, Age > 79: Yes, Altered Mental Status, Intoxication with alcohol or substance confusion (Disorientation, impaired judgment, poor safety awaremess, or inability to follow instructions): No, Impaired Mobility: Ambulates or transfers with assistive devices or assistance; Unable to ambulate or transer.: Yes  Lines:   Peripheral IV 02/24/23 Right Antecubital (Active)   Site Assessment Clean, dry & intact 02/24/23 1528   Line Status Blood return noted 02/24/23 1528   Phlebitis Assessment No symptoms 02/24/23 1528   Infiltration Assessment 0 02/24/23 1528        Opportunity for questions and clarification was provided.       Patient transported with:  Monitor and [address], RN  02/24/23 2025

## 2023-02-25 NOTE — PROGRESS NOTES
Assumed care of patient from ED; vital signs stable; needs placed within reach; denies pain.  Denies SOB; bed alarm active and audible

## 2023-02-25 NOTE — ED NOTES
Patient states that she has been experiencing dizziness intermittently x one week. States that she was experiencing dizziness prior to going to bed last night at 2100 and on awakening this morning.       Marisel Anderson RN  02/24/23 2465

## 2023-02-25 NOTE — H&P
History and Physical    Patient: Yasmine Hanson MRN: [id number]  SSN: [id number]    YOB: 1935  Age: 82 y.o. Sex: female      Subjective:      Yasmine Hanson is a 82 y.o. female who was brought into the hospital by daughter for complaints of dizziness, intermittently for about 3 weeks. Daughter reports her mother woke up today with some confusion, headache and abnormal gait. Upon arrival, MRI of the brain was done showing a small late acute vs subacute infarct in the posterior left parietal lobe subcortical white matter, with moderate chronic microvascular ischemic disease, no cerebellar infarct seen. PMH includes past CVA, hypertension, CAD , DM II, and diastolic heart failure. She was seen and evaluated by tele- neurologist and recommending further evaluation of stroke work-up , she was given a full dose aspirin x 1 dose. Neurological exam is unremarkable at time of admission. Troponin enzymes x 2 mildly elevated , 90 and 94. EKG is NSR.  She had a NM stress test in 11/21 which was normal.     Past Medical History:   Diagnosis Date    Cataract     CHF (congestive heart failure) ([address]     Claustrophobia     Crohn's disease ([address]     CVA (cerebral vascular accident) ([address]     DM (diabetes mellitus) ([address]     High cholesterol     HTN (hypertension)     Hypertensive emergency     Hypokalemia     Intractable headache     Ruptured ear drum     Syncope     Unsteady gait     UTI (urinary tract infection)      Past Surgical History:   Procedure Laterality Date    CHOLECYSTECTOMY      HYSTERECTOMY (CERVIX STATUS UNKNOWN)      THORACOTOMY        Family History   Problem Relation Age of Onset    No Known Problems Mother     No Known Problems Father     No Known Problems Sister     No Known Problems Brother     No Known Problems Maternal Aunt     No Known Problems Maternal Uncle     No Known Problems Paternal Aunt     No Known Problems Paternal Uncle     No Known Problems Maternal Grandmother     No Known Problems Maternal Grandfather     No Known Problems Paternal Grandmother     No Known Problems Paternal Grandfather     No Known Problems Maternal Cousin     No Known Problems Paternal Cousin     No Known Problems Other      Social History     Tobacco Use    Smoking status: Unknown    Smokeless tobacco: Not on file   Substance Use Topics    Alcohol use: Not Currently      Prior to Admission medications    Medication Sig Start Date End Date Taking? Authorizing Provider   cloNIDine (CATAPRES) 0.1 MG tablet Take 0.1 mg by mouth 2 times daily   Yes Historical Provider, MD   aspirin 81 MG EC tablet Take 81 mg by mouth daily   Yes Historical Provider, MD   amLODIPine (NORVASC) 5 MG tablet Take 5 mg by mouth daily 11/8/22  Yes Ar Automatic Reconciliation   atenolol (TENORMIN) 50 MG tablet Take 50 mg by mouth daily 11/1/22  Yes Ar Automatic Reconciliation   atorvastatin (LIPITOR) 40 MG tablet Take 40 mg by mouth daily 11/8/22  Yes Ar Automatic Reconciliation   glimepiride (AMARYL) 2 MG tablet Take 2 mg by mouth daily 10/15/22  Yes Ar Automatic Reconciliation   hydroCHLOROthiazide (HYDRODIURIL) 25 MG tablet Take 25 mg by mouth daily 11/8/22  Yes Ar Automatic Reconciliation   insulin glargine (LANTUS SOLOSTAR) 100 UNIT/ML injection pen Inject 15 Units into the skin 10/17/22  Yes Ar Automatic Reconciliation   isosorbide mononitrate (IMDUR) 30 MG extended release tablet Take 30 mg by mouth daily    Historical Provider, MD        Allergies   Allergen Reactions    Latex Rash    Meperidine      Other reaction(s): Not Reported This Time       Review of Systems:  Review of Systems   Constitutional:  Positive for fatigue. Respiratory:  Negative for chest tightness. Cardiovascular:  Positive for chest pain. Gastrointestinal:  Negative for abdominal pain and constipation. Genitourinary:  Negative for difficulty urinating and dysuria. Musculoskeletal:  Positive for gait problem.    Neurological:  Positive for dizziness and headaches. Objective:     Vitals:    02/24/23 1506 02/24/23 1809 02/24/23 1829 02/24/23 1913   BP: (!) 167/75 (!) 170/79 (!) 145/79 137/76   Pulse: 58 58 56 58   Resp: 26 25 17 16   Temp: 97.9 °F (36.6 °C)  97.7 °F (36.5 °C)    TempSrc: Oral  Oral    SpO2: 97% 97% 96% 97%   Weight: 156 lb (70.8 kg)      Height: 5' (1.524 m)           Physical Exam:  Physical Exam  Constitutional:       General: She is not in acute distress. Cardiovascular:      Rate and Rhythm: Normal rate. Pulses: Normal pulses. Heart sounds: Normal heart sounds. Pulmonary:      Effort: Pulmonary effort is normal.      Breath sounds: Normal breath sounds. Abdominal:      General: Bowel sounds are normal.      Palpations: Abdomen is soft. Musculoskeletal:         General: Normal range of motion. Skin:     General: Skin is warm and dry. Neurological:      General: No focal deficit present. Mental Status: She is oriented to person, place, and time. Sensory: No sensory deficit. Motor: No weakness.       Deep Tendon Reflexes: Reflexes normal.   Psychiatric:         Mood and Affect: Mood normal.         Behavior: Behavior normal.        Labs:  Recent Results (from the past 24 hour(s))   EKG 12 Lead    Collection Time: 02/24/23  3:00 PM   Result Value Ref Range    Ventricular Rate 55 BPM    Atrial Rate 53 BPM    P-R Interval 211 ms    QRS Duration 101 ms    Q-T Interval 434 ms    QTc Calculation (Bazett) 416 ms    P Axis -26 degrees    R Axis 2 degrees    T Axis 15 degrees    Diagnosis Sinus rhythm    POCT Glucose    Collection Time: 02/24/23  3:07 PM   Result Value Ref Range    POC Glucose 229 (H) 70 - 110 mg/dL    Performed by: Queta Nelson    CBC with Auto Differential    Collection Time: 02/24/23  3:10 PM   Result Value Ref Range    WBC 6.7 4.6 - 13.2 K/uL    RBC 4.43 4.20 - 5.30 M/uL    Hemoglobin 13.9 12.0 - 16.0 g/dL    Hematocrit 40.9 35.0 - 45.0 %    MCV 92.3 78.0 - 100.0 FL    MCH 31.4 24.0 - 34.0 PG MCHC 34.0 31.0 - 37.0 g/dL    RDW 12.8 11.6 - 14.5 %    Platelets 127 (L) 839 - 420 K/uL    MPV 12.1 (H) 9.2 - 11.8 FL    Nucleated RBCs 0.0 0.0  WBC    nRBC 0.00 0.00 - 0.01 K/uL    Seg Neutrophils 56 40 - 73 %    Lymphocytes 33 21 - 52 %    Monocytes 8 3 - 10 %    Eosinophils % 2 0 - 5 %    Basophils 1 0 - 2 %    Immature Granulocytes 0 0 - 0.5 %    Segs Absolute 3.7 1.8 - 8.0 K/UL    Absolute Lymph # 2.2 0.9 - 3.6 K/UL    Absolute Mono # 0.6 0.05 - 1.2 K/UL    Absolute Eos # 0.1 0.0 - 0.4 K/UL    Basophils Absolute 0.1 0.0 - 0.1 K/UL    Absolute Immature Granulocyte 0.0 0.00 - 0.04 K/UL    Differential Type AUTOMATED     Comprehensive Metabolic Panel    Collection Time: 02/24/23  3:10 PM   Result Value Ref Range    Sodium 136 136 - 145 mmol/L    Potassium 2.9 (LL) 3.5 - 5.5 mmol/L    Chloride 97 (L) 100 - 111 mmol/L    CO2 33 (H) 21 - 32 mmol/L    Anion Gap 6 3.0 - 18.0 mmol/L    Glucose 243 (H) 74 - 99 mg/dL    BUN 21 (H) 7 - 18 mg/dL    Creatinine 1.07 0.60 - 1.30 mg/dL    Bun/Cre Ratio 20 12 - 20      Est, Glom Filt Rate 51 (L) >60 ml/min/1.73m2    Calcium 10.0 8.5 - 10.1 mg/dL    Total Bilirubin 0.7 0.2 - 1.0 mg/dL    AST 21 10 - 38 U/L    ALT 21 13 - 56 U/L    Alk Phosphatase 61 45 - 117 U/L    Total Protein 6.9 6.4 - 8.2 g/dL    Albumin 3.4 3.4 - 5.0 g/dL    Globulin 3.5 2.0 - 4.0 g/dL    Albumin/Globulin Ratio 1.0 0.8 - 1.7     Troponin    Collection Time: 02/24/23  3:10 PM   Result Value Ref Range    Troponin, High Sensitivity 90 (H) 0 - 54 ng/L   Brain Natriuretic Peptide    Collection Time: 02/24/23  3:10 PM   Result Value Ref Range    NT Pro- 0 - 1,800 pg/mL   Urinalysis    Collection Time: 02/24/23  5:55 PM   Result Value Ref Range    Color, UA Yellow/Straw      Appearance Hazy (A) Clear      Specific Gravity, UA 1.015 1.003 - 1.035      pH, Urine 7.0 5.0 - 9.0      Protein, UA Negative Negative mg/dL    Glucose, UA Negative Negative mg/dL    Ketones, Urine Negative Negative mg/dL Bilirubin Urine Negative Negative      Blood, Urine Negative Negative      Urobilinogen, Urine 0.2 0.2 - 1.0 EU/dL    Nitrite, Urine Negative Negative      Leukocyte Esterase, Urine Moderate (A) Negative     Urinalysis, Micro    Collection Time: 02/24/23  5:55 PM   Result Value Ref Range    WBC, UA 5-10 0 - 4 /hpf    RBC, UA 0-5 0 - 2 /hpf    Epithelial Cells UA Few 0 - 20 /lpf    BACTERIA, URINE 2+ (A) None /hpf   Troponin    Collection Time: 02/24/23  7:37 PM   Result Value Ref Range    Troponin, High Sensitivity 94 (H) 0 - 54 ng/L       Imaging:  CT HEAD WO CONTRAST    Result Date: 2/24/2023  EXAM: CT HEAD WO CONTRAST INDICATION: dizziness COMPARISON: None. CONTRAST: None. TECHNIQUE: Unenhanced CT of the head was performed using 5 mm images. Brain and bone windows were generated. Coronal and sagittal reformats. CT dose reduction was achieved through use of a standardized protocol tailored for this examination and automatic exposure control for dose modulation. FINDINGS: The ventricles and sulci are normal in size, shape and configuration. There is moderate patchy periventricular white matter hypodensity. The left external capsule lacunar infarct is old. There is no intracranial hemorrhage, extra-axial collection, or mass effect. The basilar cisterns are open. No CT evidence of acute infarct. The bone windows demonstrate no abnormalities. The visualized portions of the paranasal sinuses and mastoid air cells are clear. Nonspecific white matter disease. No evidence for acute intracranial hemorrhage. MRA HEAD WO CONTRAST    Result Date: 2/24/2023  EXAM:  MRI BRAIN WO CONTRAST, MRA HEAD WO CONTRAST INDICATION: Dizziness. COMPARISON: CT head earlier this afternoon. No comparison MRI brain. TECHNIQUE: Multisequence, multiplanar MRI of the brain without contrast. Noncontrast time of flight MR angiography of the head. Multiplanar maximum intensity projection reformats.  (2 separate studies reported together) FINDINGS: MRI brain: No hydrocephalus. No mass effect or midline shift. No extra-axial fluid collection. Focal restricted diffusion is in the posterior left parietal lobe subcortical white matter. Mild associated hyperintense T2/FLAIR signal. Chronic microvascular ischemic disease is moderate at age appropriate, greatest in the bilateral frontal periventricular white matter. No cerebellar infarct. No gradient susceptibility artifact in the cerebrum. The midline structures, including the cervicomedullary junction, are within normal limits. MRA head: The vertebral arteries are codominant. The basilar artery and its branches are normal. Right posterior cerebral artery fills via the right posterior communicating artery, a normal congenital variant. The internal carotid, anterior cerebral, and middle cerebral arteries are patent. There is no flow-limiting intracranial stenosis. There is no aneurysm. There is no vascular malformation. 1. Small late acute versus subacute infarct in the posterior left parietal lobe subcortical white matter. 2. Moderate chronic microvascular ischemic disease. No cerebellar infarct. 3. Normal MR angiography of the head. MRI BRAIN WO CONTRAST    Result Date: 2/24/2023  EXAM:  MRI BRAIN WO CONTRAST, MRA HEAD WO CONTRAST INDICATION: Dizziness. COMPARISON: CT head earlier this afternoon. No comparison MRI brain. TECHNIQUE: Multisequence, multiplanar MRI of the brain without contrast. Noncontrast time of flight MR angiography of the head. Multiplanar maximum intensity projection reformats. (2 separate studies reported together) FINDINGS: MRI brain: No hydrocephalus. No mass effect or midline shift. No extra-axial fluid collection. Focal restricted diffusion is in the posterior left parietal lobe subcortical white matter.  Mild associated hyperintense T2/FLAIR signal. Chronic microvascular ischemic disease is moderate at age appropriate, greatest in the bilateral frontal periventricular white matter. No cerebellar infarct. No gradient susceptibility artifact in the cerebrum. The midline structures, including the cervicomedullary junction, are within normal limits. MRA head: The vertebral arteries are codominant. The basilar artery and its branches are normal. Right posterior cerebral artery fills via the right posterior communicating artery, a normal congenital variant. The internal carotid, anterior cerebral, and middle cerebral arteries are patent. There is no flow-limiting intracranial stenosis. There is no aneurysm. There is no vascular malformation. 1. Small late acute versus subacute infarct in the posterior left parietal lobe subcortical white matter. 2. Moderate chronic microvascular ischemic disease. No cerebellar infarct. 3. Normal MR angiography of the head. XR Results (maximum last 3): No results found for this or any previous visit from the past 3650 days. CT Results (maximum last 3): Harden Beech CT HEAD WO CONTRAST 02/24/2023    Narrative  EXAM: CT HEAD WO CONTRAST    INDICATION: dizziness    COMPARISON: None. CONTRAST: None. TECHNIQUE: Unenhanced CT of the head was performed using 5 mm images. Brain and  bone windows were generated. Coronal and sagittal reformats. CT dose reduction  was achieved through use of a standardized protocol tailored for this  examination and automatic exposure control for dose modulation. FINDINGS:  The ventricles and sulci are normal in size, shape and configuration. There is  moderate patchy periventricular white matter hypodensity. The left external  capsule lacunar infarct is old. There is no intracranial hemorrhage, extra-axial  collection, or mass effect. The basilar cisterns are open. No CT evidence of  acute infarct. The bone windows demonstrate no abnormalities. The visualized portions of the  paranasal sinuses and mastoid air cells are clear. Impression  Nonspecific white matter disease.   No evidence for acute intracranial hemorrhage. MRI Results (maximum last 3): MRI BRAIN WO CONTRAST 02/24/2023    Narrative  EXAM:  MRI BRAIN WO CONTRAST, MRA HEAD WO CONTRAST    INDICATION: Dizziness. COMPARISON: CT head earlier this afternoon. No comparison MRI brain. TECHNIQUE: Multisequence, multiplanar MRI of the brain without contrast.  Noncontrast time of flight MR angiography of the head. Multiplanar maximum  intensity projection reformats. (2 separate studies reported together)    FINDINGS: MRI brain: No hydrocephalus. No mass effect or midline shift. No  extra-axial fluid collection. Focal restricted diffusion is in the posterior  left parietal lobe subcortical white matter. Mild associated hyperintense  T2/FLAIR signal.    Chronic microvascular ischemic disease is moderate at age appropriate, greatest  in the bilateral frontal periventricular white matter. No cerebellar infarct. No  gradient susceptibility artifact in the cerebrum. The midline structures,  including the cervicomedullary junction, are within normal limits. MRA head: The vertebral arteries are codominant. The basilar artery and its  branches are normal. Right posterior cerebral artery fills via the right  posterior communicating artery, a normal congenital variant. The internal  carotid, anterior cerebral, and middle cerebral arteries are patent. There is no  flow-limiting intracranial stenosis. There is no aneurysm. There is no vascular  malformation. Impression  1. Small late acute versus subacute infarct in the posterior left parietal lobe  subcortical white matter. 2. Moderate chronic microvascular ischemic disease. No cerebellar infarct. 3. Normal MR angiography of the head. Nuclear Medicine Results (maximum last 3):  CT HEAD WO CONTRAST 02/24/2023    Narrative  EXAM: CT HEAD WO CONTRAST    INDICATION: dizziness    COMPARISON: None. CONTRAST: None. TECHNIQUE: Unenhanced CT of the head was performed using 5 mm images.  Brain and  bone windows were generated. Coronal and sagittal reformats. CT dose reduction  was achieved through use of a standardized protocol tailored for this  examination and automatic exposure control for dose modulation. FINDINGS:  The ventricles and sulci are normal in size, shape and configuration. There is  moderate patchy periventricular white matter hypodensity. The left external  capsule lacunar infarct is old. There is no intracranial hemorrhage, extra-axial  collection, or mass effect. The basilar cisterns are open. No CT evidence of  acute infarct. The bone windows demonstrate no abnormalities. The visualized portions of the  paranasal sinuses and mastoid air cells are clear. Impression  Nonspecific white matter disease. No evidence for acute intracranial hemorrhage. US Results (maximum last 3): No results found for this or any previous visit from the past 3650 days. Principal Problem:    Stroke due to occlusion of left posterior cerebral artery (HCC)  Resolved Problems:    * No resolved hospital problems. *      Assessment/Plan:   CVA- MRI brain - small late acute vs subacute infarct in the posterior left parietal lobe subcortical white matter, with moderate chronic microvascular ischemic disease, no cerebellar infarct seen. Full dose asa given, continue low dose asa, statin, OT/PT/speech consults. 2. Hypertension- continue home medications,not at goal, increase amlodipine to 10 mg qd. 3.  DM II- s/s insulin, hold glimeperide,lantus for now. Check A1c    4.   CAD- on statin, check lipid panel    DVT Prophylaxis lovenox  Code Status  Full  POA NELDA [facility] ifrah Ruiz Bills   Total Time 35 minutes      Signed By: Matias Stoner, APRN - CNP     February 24, 2023

## 2023-02-25 NOTE — THERAPY EVALUATION
PHYSICAL THERAPY EVALUATION AND DISCHARGE    Patient: Annie Daniel (47 y.o. female)  Date: 2/25/2023  Physical Therapy Time:   PT Individual Minutes  Time In: 0935  Time Out: 1000  Minutes: 25  Timed Minute Breakdown:  PTE: 25        Primary Diagnosis: Hypokalemia [E87.6]  Acute cystitis without hematuria [N30.00]  Troponin I above reference range [R77.8]  Stroke due to occlusion of left posterior cerebral artery (HCC) [I63.532]  Cerebrovascular accident (CVA) due to thrombosis of left posterior cerebral artery ([address] [I63.332] Stroke due to occlusion of left posterior cerebral artery ([address]  Present on Admission:   Stroke due to occlusion of left posterior cerebral artery (HCC)        Precautions:    Restrictions/Precautions  Restrictions/Precautions: Fall Risk           Conditions Requiring skilled therapeutic intervention:    NA    Evaluation Activity Tolerance:   Activity Tolerance  Activity Tolerance: Patient tolerated evaluation without incident    Equipment Recommendations for Discharge:   None    AM-PAC   23; Current research shows that an AM-PAC score of 17 or less is typically not associated with a discharge to the patient's home setting, whereas a score of 18 or greater is typically associated with a discharge to the patient's home setting. Factors which may influence rehabilitation potential include:  Prior impaired baseline level of function  and Medical condition/comorbidities      PLAN :  Physcial Therapy Plan  General Plan: Discharge with evaluation only  Pt to be DC home with [facility]. Pt already participating in Home health PT and plans to continue. SUBJECTIVE:     Pt states, \"I haven't lost any strength. I think I can do everything I could do before. \"    OBJECTIVE DATA SUMMARY:     Past Medical History:   Diagnosis Date    Cataract     CHF (congestive heart failure) ([facility]     Claustrophobia     Crohn's disease ([facility]     CVA (cerebral vascular accident) ([facility]     DM (diabetes mellitus) ([facility] High cholesterol     HTN (hypertension)     Hypertensive emergency     Hypokalemia     Intractable headache     Ruptured ear drum     Syncope     Unsteady gait     UTI (urinary tract infection)      Past Surgical History:   Procedure Laterality Date    CHOLECYSTECTOMY      HYSTERECTOMY (CERVIX STATUS UNKNOWN)      THORACOTOMY         Home Situation:   Pt lives at home with daughter      Orientation and Cognition:   Orientation  Overall Orientation Status: Within Normal Limits  Orientation Level: Oriented X4    Cognition  Overall Cognitive Status: WNL    Observations:    Pt performs bed mobility independently. Pt completes transfers and ambulates with SBA in case of dizziness but pt reports no dizziness during evaluation. Range Of Motion:     [facility]             Strength:     RLE: generalized weakness 4/5; functional  LLE: generalized weakness 4/5; functional    Pt reports she became weaker after she had a heart issue ~2 months ago but has been participating in home health PT for strengthening. Functional Mobility:  Bed Mobility:  Bed mobility  Bridging: Independent  Rolling to Left: Independent  Rolling to Right: Independent  Supine to Sit: Independent  Sit to Supine: Independent  Scooting: Independent    Transfers:  Transfers  Sit to Stand: Stand by assistance  Stand to Sit: Stand by assistance  Bed to Chair: Stand by assistance  Stand Pivot Transfers: Stand by assistance    Balance:   Supine to Sit: Independent    Ambulation/Stair navigation:  Ambulation  Device: Single point cane  Assistance: Stand by assistance  Gait Deviations: Decreased step length; Slow Paige  Distance: 300  Stairs/Curb  Stairs?: No        Balance:   Sitting: Good    Standing: Good    Today's Tx:   Pt participates in PT evaluation and performs bed mobility independently. She transfers and ambulates with SBA but reports no dizziness during evaluation. Pt educated on exercise dosing, AD use, and safety awareness. Pain:     Subjective  Pain: 0/10    After treatment:   []         Patient left in no apparent distress sitting up in chair  [x]         Patient left in no apparent distress in bed  [x]         Call bell left within reach  [x]         Nursing notified  [x]         Caregiver/Family present  []         Bed alarm activated  []         SCDs applied    COMMUNICATION/EDUCATION:   Patient Education  Education Given To: Patient  Education Provided: Fall Prevention Strategies    PT GOALS:     NA     Thank you for this referral.  Therapist Signature: Layne Danielson PT, DPT

## 2023-02-25 NOTE — CARE COORDINATION
Case Management Assessment  Initial Evaluation    Date/Time of Evaluation: 2/25/2023 3:10 PM  Assessment Completed by: Lainey Mao RN    If patient is discharged prior to next notation, then this note serves as note for discharge by case management. Patient Name: Cristal Aguilar                   YOB: 1935  Diagnosis: Hypokalemia [E87.6]  Acute cystitis without hematuria [N30.00]  Troponin I above reference range [R77.8]  Stroke due to occlusion of left posterior cerebral artery (HCC) [I63.532]  Cerebrovascular accident (CVA) due to thrombosis of left posterior cerebral artery ([facility] [address] [I63.332]                   Date / Time: 2/24/2023  2:55 PM    Patient Admission Status: Observation   Readmission Risk (Low < 19, Mod (19-27), High > 27): No data recorded  Current PCP: Woo Armstrong MD  PCP verified by CM? Yes    Chart Reviewed: Yes      History Provided by: Patient, Child/Family  Patient Orientation: Alert and Oriented    Patient Cognition: Alert    Hospitalization in the last 30 days (Readmission):  No    If yes, Readmission Assessment in CM Navigator will be completed.     Advance Directives:      Code Status: Prior   Patient's Primary Decision Maker is: Legal Next of Kin      Discharge Planning:    Patient lives with: (P) Children Type of Home: (P) House  Primary Care Giver: Family  Patient Support Systems include: Children   Current Financial resources: (P) Medicare  Current community resources: (P) None  Current services prior to admission: (P) None (Home Health)            Current DME:              Type of Home Care services:  (P) PT    ADLS  Prior functional level: (P) Independent in ADLs/IADLs  Current functional level: (P) Independent in ADLs/IADLs    PT AM-PAC:   /24  OT AM-PAC:   /24    Family can provide assistance at DC: (P) Yes  Would you like Case Management to discuss the discharge plan with any other family members/significant others, and if so, who? (P) Yes (Daughter)  Plans to Return to Present Housing: (P) Yes  Other Identified Issues/Barriers to RETURNING to current housing: None  Potential Assistance needed at discharge: (P) N/A            Potential DME:    Patient expects to discharge to: (P) [address] [place] [address] for transportation at discharge: (P) Family    Financial    Payor: [address] [place] [address],[address] / Plan: MEDICARE PART A AND B / Product Type: *No Product type* /     Does insurance require precert for SNF: No    Potential assistance Purchasing Medications: (P) No  Meds-to-Beds request:      No Pharmacies Listed    Notes:    Factors facilitating achievement of predicted outcomes: Family support    Barriers to discharge: None    Additional Case Management Notes: Discharge Planning/Transition of Care- NA. Teach back and Medication Reconciliation will be completed. Nurse will make follow-up appointments. Patient lives at home with her daughter. She receives HH with [facility], and it will need to be resumed. Patient will be returning back home with [facility] at discharge. The Plan for Transition of Care is related to the following treatment goals of Hypokalemia [E87.6]  Acute cystitis without hematuria [N30.00]  Troponin I above reference range [R77.8]  Stroke due to occlusion of left posterior cerebral artery (HCC) [I63.532]  Cerebrovascular accident (CVA) due to thrombosis of left posterior cerebral artery ([address] [I58.639]    IF APPLICABLE: The Patient and/or patient representative Vandana Drake and her family were provided with a choice of provider and agrees with the discharge plan. Freedom of choice list with basic dialogue that supports the patient's individualized plan of care/goals and shares the quality data associated with the providers was provided to: (P) Patient   Patient Representative Name:       The Patient and/or Patient Representative Agree with the Discharge Plan?  (P) Yes    Jules Campos RN  Case Management Department  Ph: [phone] Fax: [phone]

## 2023-02-25 NOTE — DISCHARGE SUMMARY
Discharge Summary       Patient ID:  Michale Cowden,   82 y.o., female  1935    PCP:  Blanca Fierro MD    Admit Date: 2/24/2023  2:55 PM  Discharge Date:  No discharge date for patient encounter. Length of stay: 0 day(s)  Code Status: Full Code  Discharging physician: Rose Sever, MD    Admission Diagnoses:   Hypokalemia [E87.6]  Acute cystitis without hematuria [N30.00]  Troponin I above reference range [R77.8]  Stroke due to occlusion of left posterior cerebral artery (HCC) [I63.532]  Cerebrovascular accident (CVA) due to thrombosis of left posterior cerebral artery ([address] [I63.332]    Discharge Medications     Medication List        CONTINUE taking these medications      amLODIPine 5 MG tablet  Commonly known as: NORVASC     aspirin 81 MG EC tablet     atenolol 50 MG tablet  Commonly known as: TENORMIN     atorvastatin 40 MG tablet  Commonly known as: LIPITOR     cloNIDine 0.1 MG tablet  Commonly known as: CATAPRES     glimepiride 2 MG tablet  Commonly known as: AMARYL     hydroCHLOROthiazide 25 MG tablet  Commonly known as: HYDRODIURIL     isosorbide mononitrate 30 MG extended release tablet  Commonly known as: IMDUR     Lantus SoloStar 100 UNIT/ML injection pen  Generic drug: insulin glargine              HPI on Admission (per admitting physician):   Michale Cowden is a 82 y.o. female who was brought into the hospital by daughter for complaints of dizziness, intermittently for about 3 weeks. Daughter reports her mother woke up today with some confusion, headache and abnormal gait. Upon arrival, MRI of the brain was done showing a small late acute vs subacute infarct in the posterior left parietal lobe subcortical white matter, with moderate chronic microvascular ischemic disease, no cerebellar infarct seen. PMH includes past CVA, hypertension, CAD , DM II, and diastolic heart failure.  She was seen and evaluated by tele- neurologist and recommending further evaluation of stroke work-up , she was given a full dose aspirin x 1 dose. Neurological exam is unremarkable at time of admission. Troponin enzymes x 2 mildly elevated , 90 and 94. EKG is NSR. She had a [place] stress test in 11/21 which was normal.     Hospital Course and Discharge Diagnosis   The patient admitted for the following Principal Medical Problem:   Acute CVA - while on ASA  - Symptoms started 3 weeks ago, and  yesterday with some confusion, headache and abnormal gait  - MRI brain - small late acute vs subacute infarct in the posterior left parietal lobe subcortical white matter, with moderate chronic microvascular ischemic disease, no cerebellar infarct seen. -Full dose ASA given initially , continue low dose ASA, statin, no event on tele  Hypokalemia  - K 2.9 on admission, replaced in ED , repeat Lab pending   Hypertension  - continue with home regimen - atenolol, clonidine, HCTZ,   DM II  - s/s insulin, hold glimeperide,lantus for now. Check A1c  CAD  - denies chest pain  - cont' with ASA, statin , BB :     The patient condition became clinically stable and No new complain or complication during the hospital course. The Medication changes discussed with the patient and family on the discharge      Condition at discharge   Stable      Physical Exam on Discharge:  BP (!) 143/71   Pulse 65   Temp 97.6 °F (36.4 °C) (Temporal)   Resp 18   Ht 5' 1\" (1.549 m)   Wt 154 lb 1.6 oz (69.9 kg)   SpO2 96%   BMI 29.12 kg/m²     General Appearance:   Appears in no acute distress. , Sitting up.,   Skin:   Skin warm & dry, No rash, No jaundice,   Lymph: There is no lymphadenopathy,   HEENT:   PERRLA, EOMI, Moist oral mucous membranes, conjunctiva clear,   Neck:   Supple, Without masses,   Lungs:   Clear, No wheezes. , No rales. , Normal respiratory effort,   Heart:   Regular rate and rhythm, No gallop,   Abdomen:   Soft , Non-distended, Normal bowel sounds and Non-tender,   Extremities:  no edema of legs, Normal pedal and radial pulses,   Neuro: normal      Procedures:    No discharge procedures on file.      Consultants/Treatment Team:    Treatment Team: Attending Provider: Trang Aaron MD; LPN: Ye Ramirez LPN    Disposition:    Home    Follow Up   Maylin Forde MD    Most Recent Labs:  Recent Results (from the past 24 hour(s))   EKG 12 Lead    Collection Time: 02/24/23  3:00 PM   Result Value Ref Range    Ventricular Rate 55 BPM    Atrial Rate 53 BPM    P-R Interval 211 ms    QRS Duration 101 ms    Q-T Interval 434 ms    QTc Calculation (Bazett) 416 ms    P Axis -26 degrees    R Axis 2 degrees    T Axis 15 degrees    Diagnosis Sinus rhythm    POCT Glucose    Collection Time: 02/24/23  3:07 PM   Result Value Ref Range    POC Glucose 229 (H) 70 - 110 mg/dL    Performed by: Morris Larson    CBC with Auto Differential    Collection Time: 02/24/23  3:10 PM   Result Value Ref Range    WBC 6.7 4.6 - 13.2 K/uL    RBC 4.43 4.20 - 5.30 M/uL    Hemoglobin 13.9 12.0 - 16.0 g/dL    Hematocrit 40.9 35.0 - 45.0 %    MCV 92.3 78.0 - 100.0 FL    MCH 31.4 24.0 - 34.0 PG    MCHC 34.0 31.0 - 37.0 g/dL    RDW 12.8 11.6 - 14.5 %    Platelets 992 (L) 365 - 420 K/uL    MPV 12.1 (H) 9.2 - 11.8 FL    Nucleated RBCs 0.0 0.0  WBC    nRBC 0.00 0.00 - 0.01 K/uL    Seg Neutrophils 56 40 - 73 %    Lymphocytes 33 21 - 52 %    Monocytes 8 3 - 10 %    Eosinophils % 2 0 - 5 %    Basophils 1 0 - 2 %    Immature Granulocytes 0 0 - 0.5 %    Segs Absolute 3.7 1.8 - 8.0 K/UL    Absolute Lymph # 2.2 0.9 - 3.6 K/UL    Absolute Mono # 0.6 0.05 - 1.2 K/UL    Absolute Eos # 0.1 0.0 - 0.4 K/UL    Basophils Absolute 0.1 0.0 - 0.1 K/UL    Absolute Immature Granulocyte 0.0 0.00 - 0.04 K/UL    Differential Type AUTOMATED     Comprehensive Metabolic Panel    Collection Time: 02/24/23  3:10 PM   Result Value Ref Range    Sodium 136 136 - 145 mmol/L    Potassium 2.9 (LL) 3.5 - 5.5 mmol/L    Chloride 97 (L) 100 - 111 mmol/L    CO2 33 (H) 21 - 32 mmol/L    Anion Gap 6 3.0 - 18.0 mmol/L    Glucose 243 (H) 74 - 99 mg/dL    BUN 21 (H) 7 - 18 mg/dL    Creatinine 1.07 0.60 - 1.30 mg/dL    Bun/Cre Ratio 20 12 - 20      Est, Glom Filt Rate 51 (L) >60 ml/min/1.73m2    Calcium 10.0 8.5 - 10.1 mg/dL    Total Bilirubin 0.7 0.2 - 1.0 mg/dL    AST 21 10 - 38 U/L    ALT 21 13 - 56 U/L    Alk Phosphatase 61 45 - 117 U/L    Total Protein 6.9 6.4 - 8.2 g/dL    Albumin 3.4 3.4 - 5.0 g/dL    Globulin 3.5 2.0 - 4.0 g/dL    Albumin/Globulin Ratio 1.0 0.8 - 1.7     Troponin    Collection Time: 02/24/23  3:10 PM   Result Value Ref Range    Troponin, High Sensitivity 90 (H) 0 - 54 ng/L   Brain Natriuretic Peptide    Collection Time: 02/24/23  3:10 PM   Result Value Ref Range    NT Pro- 0 - 1,800 pg/mL   Urinalysis    Collection Time: 02/24/23  5:55 PM   Result Value Ref Range    Color, UA Yellow/Straw      Appearance Hazy (A) Clear      Specific Gravity, UA 1.015 1.003 - 1.035      pH, Urine 7.0 5.0 - 9.0      Protein, UA Negative Negative mg/dL    Glucose, UA Negative Negative mg/dL    Ketones, Urine Negative Negative mg/dL    Bilirubin Urine Negative Negative      Blood, Urine Negative Negative      Urobilinogen, Urine 0.2 0.2 - 1.0 EU/dL    Nitrite, Urine Negative Negative      Leukocyte Esterase, Urine Moderate (A) Negative     Urinalysis, Micro    Collection Time: 02/24/23  5:55 PM   Result Value Ref Range    WBC, UA 5-10 0 - 4 /hpf    RBC, UA 0-5 0 - 2 /hpf    Epithelial Cells UA Few 0 - 20 /lpf    BACTERIA, URINE 2+ (A) None /hpf   Troponin    Collection Time: 02/24/23  7:37 PM   Result Value Ref Range    Troponin, High Sensitivity 94 (H) 0 - 54 ng/L   POCT Glucose    Collection Time: 02/24/23  9:37 PM   Result Value Ref Range    POC Glucose 193 (H) 70 - 110 mg/dL    Performed by: Ana Aragon    CBC    Collection Time: 02/25/23  4:30 AM   Result Value Ref Range    WBC 6.1 4.6 - 13.2 K/uL    RBC 4.34 4.20 - 5.30 M/uL    Hemoglobin 13.7 12.0 - 16.0 g/dL    Hematocrit 40.1 35.0 - 45.0 %    MCV 92.4 78.0 - 100.0 FL MCH 31.6 24.0 - 34.0 PG    MCHC 34.2 31.0 - 37.0 g/dL    RDW 12.7 11.6 - 14.5 %    Platelets 593 (L) 291 - 420 K/uL    MPV 12.6 (H) 9.2 - 11.8 FL    Nucleated RBCs 0.0 0.0  WBC    nRBC 0.00 0.00 - 0.01 K/uL   Lipid Panel    Collection Time: 02/25/23  4:30 AM   Result Value Ref Range    LIPID PANEL        Cholesterol, Total 123 <200 mg/dL    Triglycerides 169 (H) <150 mg/dL    HDL 61 (H) 40 - 60 mg/dL    LDL Calculated 28.2 0 - 100 mg/dL    VLDL Cholesterol Calculated 33.8 mg/dL    Chol/HDL Ratio 2.0 0 - 5.0     Basic Metabolic Panel    Collection Time: 02/25/23  4:30 AM   Result Value Ref Range    Sodium 140 136 - 145 mmol/L    Potassium 3.3 (L) 3.5 - 5.5 mmol/L    Chloride 102 100 - 111 mmol/L    CO2 32 21 - 32 mmol/L    Anion Gap 6 3.0 - 18.0 mmol/L    Glucose 141 (H) 74 - 99 mg/dL    BUN 17 7 - 18 mg/dL    Creatinine 0.95 0.60 - 1.30 mg/dL    Bun/Cre Ratio 18 12 - 20      Est, Glom Filt Rate 59 (L) >60 ml/min/1.73m2    Calcium 9.9 8.5 - 10.1 mg/dL   POCT Glucose    Collection Time: 02/25/23  7:19 AM   Result Value Ref Range    POC Glucose 150 (H) 70 - 110 mg/dL    Performed by: Ana Jeffries      Recent Labs     02/25/23  0430   GLUCOSE 141*   BUN 17   CALCIUM 9.9      CO2 32     Recent Labs     02/25/23  0430   WBC 6.1   RBC 4.34   HCT 40.1   MCV 92.4   MCH 31.6   MCHC 34.2   RDW 12.7         Last 3 CBC:   Recent Labs     02/24/23  1510 02/25/23  0430   WBC 6.7 6.1   RBC 4.43 4.34   HGB 13.9 13.7   HCT 40.9 40.1   MCV 92.3 92.4   MCH 31.4 31.6   MCHC 34.0 34.2   RDW 12.8 12.7   * 113*   MPV 12.1* 12.6*      Last 3 BMP:   Recent Labs     02/24/23  1510 02/25/23  0430    140   K 2.9* 3.3*   CL 97* 102   CO2 33* 32   BUN 21* 17   CREATININE 1.07 0.95   GLUCOSE 243* 141*   CALCIUM 10.0 9.9      Last 3 CMP:   Recent Labs     02/24/23  1510 02/25/23  0430    140   K 2.9* 3.3*   CL 97* 102   CO2 33* 32   BUN 21* 17   CREATININE 1.07 0.95   GLUCOSE 243* 141*   CALCIUM 10.0 9.9   PROT 6.9 --    LABALBU 3.4  --    ALKPHOS 61  --    AST 21  --    ALT 21  --             Imaging results impression only:  CT HEAD WO CONTRAST    Result Date: 2/24/2023  Nonspecific white matter disease. No evidence for acute intracranial hemorrhage. MRA HEAD WO CONTRAST    Result Date: 2/24/2023  1. Small late acute versus subacute infarct in the posterior left parietal lobe subcortical white matter. 2. Moderate chronic microvascular ischemic disease. No cerebellar infarct. 3. Normal MR angiography of the head. MRI BRAIN WO CONTRAST    Result Date: 2/24/2023  1. Small late acute versus subacute infarct in the posterior left parietal lobe subcortical white matter. 2. Moderate chronic microvascular ischemic disease. No cerebellar infarct. 3. Normal MR angiography of the head.                Reza Blackman MD   Hospitalist

## 2023-02-26 ENCOUNTER — HOSPITAL ENCOUNTER (EMERGENCY)
Age: 88
Discharge: HOME | End: 2023-02-27
Attending: FAMILY MEDICINE | Admitting: FAMILY MEDICINE
Payer: MEDICARE

## 2023-02-26 DIAGNOSIS — E87.20 LACTIC ACIDOSIS: ICD-10-CM

## 2023-02-26 DIAGNOSIS — E86.0 DEHYDRATION: ICD-10-CM

## 2023-02-26 DIAGNOSIS — R25.1 SHAKING: Primary | ICD-10-CM

## 2023-02-26 DIAGNOSIS — N39.0 URINARY TRACT INFECTION IN FEMALE: ICD-10-CM

## 2023-02-26 DIAGNOSIS — E83.42 HYPOMAGNESEMIA: ICD-10-CM

## 2023-02-26 DIAGNOSIS — E87.6 HYPOKALEMIA: ICD-10-CM

## 2023-02-26 LAB
BACTERIA SPEC CULT: ABNORMAL
COLONY COUNT, CNT: ABNORMAL
Lab: ABNORMAL

## 2023-02-26 PROCEDURE — 96375 TX/PRO/DX INJ NEW DRUG ADDON: CPT

## 2023-02-26 PROCEDURE — 93005 ELECTROCARDIOGRAM TRACING: CPT | Performed by: FAMILY MEDICINE

## 2023-02-26 PROCEDURE — 80053 COMPREHEN METABOLIC PANEL: CPT

## 2023-02-26 PROCEDURE — 96365 THER/PROPH/DIAG IV INF INIT: CPT

## 2023-02-26 PROCEDURE — 83735 ASSAY OF MAGNESIUM: CPT

## 2023-02-26 PROCEDURE — 85025 COMPLETE CBC W/AUTO DIFF WBC: CPT

## 2023-02-26 PROCEDURE — 84443 ASSAY THYROID STIM HORMONE: CPT

## 2023-02-26 PROCEDURE — 83605 ASSAY OF LACTIC ACID: CPT

## 2023-02-26 PROCEDURE — 99284 EMERGENCY DEPT VISIT MOD MDM: CPT

## 2023-02-26 RX ORDER — 0.9 % SODIUM CHLORIDE 0.9 %
500 INTRAVENOUS SOLUTION INTRAVENOUS ONCE
Status: COMPLETED | OUTPATIENT
Start: 2023-02-27 | End: 2023-02-27

## 2023-02-27 ENCOUNTER — APPOINTMENT (OUTPATIENT)
Age: 88
End: 2023-02-27
Payer: MEDICARE

## 2023-02-27 VITALS
OXYGEN SATURATION: 98 % | HEART RATE: 70 BPM | SYSTOLIC BLOOD PRESSURE: 153 MMHG | TEMPERATURE: 98.4 F | BODY MASS INDEX: 28.34 KG/M2 | DIASTOLIC BLOOD PRESSURE: 60 MMHG | WEIGHT: 150 LBS | RESPIRATION RATE: 18 BRPM

## 2023-02-27 LAB
ALBUMIN SERPL-MCNC: 3.8 G/DL (ref 3.4–5)
ALBUMIN/GLOB SERPL: 0.9 (ref 0.8–1.7)
ALP SERPL-CCNC: 73 U/L (ref 45–117)
ALT SERPL-CCNC: 21 U/L (ref 13–56)
ANION GAP SERPL CALC-SCNC: 11 MMOL/L (ref 3–18)
APPEARANCE UR: ABNORMAL
AST SERPL W P-5'-P-CCNC: 25 U/L (ref 10–38)
BACTERIA URNS QL MICRO: ABNORMAL /HPF
BASOPHILS # BLD: 0.1 K/UL (ref 0–0.1)
BASOPHILS NFR BLD: 1 % (ref 0–2)
BILIRUB SERPL-MCNC: 0.7 MG/DL (ref 0.2–1)
BILIRUB UR QL: NEGATIVE
BUN SERPL-MCNC: 20 MG/DL (ref 7–18)
BUN/CREAT SERPL: 17 (ref 12–20)
CA-I BLD-MCNC: 10.4 MG/DL (ref 8.5–10.1)
CHLORIDE SERPL-SCNC: 96 MMOL/L (ref 100–111)
CO2 SERPL-SCNC: 28 MMOL/L (ref 21–32)
COLOR UR: YELLOW
CREAT SERPL-MCNC: 1.17 MG/DL (ref 0.6–1.3)
DIFFERENTIAL METHOD BLD: ABNORMAL
EKG ATRIAL RATE: 61 BPM
EKG DIAGNOSIS: NORMAL
EKG P AXIS: -58 DEGREES
EKG P-R INTERVAL: 197 MS
EKG Q-T INTERVAL: 417 MS
EKG QRS DURATION: 94 MS
EKG QTC CALCULATION (BAZETT): 427 MS
EKG R AXIS: 3 DEGREES
EKG T AXIS: 3 DEGREES
EKG VENTRICULAR RATE: 63 BPM
EOSINOPHIL # BLD: 0.1 K/UL (ref 0–0.4)
EOSINOPHIL NFR BLD: 2 % (ref 0–5)
EPITH CASTS URNS QL MICRO: ABNORMAL /LPF (ref 0–20)
ERYTHROCYTE [DISTWIDTH] IN BLOOD BY AUTOMATED COUNT: 12.7 % (ref 11.6–14.5)
GLOBULIN SER CALC-MCNC: 4.3 G/DL (ref 2–4)
GLUCOSE SERPL-MCNC: 184 MG/DL (ref 74–99)
GLUCOSE UR STRIP.AUTO-MCNC: NEGATIVE MG/DL
HCT VFR BLD AUTO: 45.3 % (ref 35–45)
HGB BLD-MCNC: 15.5 G/DL (ref 12–16)
HGB UR QL STRIP: NEGATIVE
IMM GRANULOCYTES # BLD AUTO: 0 K/UL (ref 0–0.04)
IMM GRANULOCYTES NFR BLD AUTO: 0 % (ref 0–0.5)
KETONES UR QL STRIP.AUTO: NEGATIVE MG/DL
LACTATE SERPL-SCNC: 1.7 MMOL/L (ref 0.4–2)
LACTATE SERPL-SCNC: 2.3 MMOL/L (ref 0.4–2)
LEUKOCYTE ESTERASE UR QL STRIP.AUTO: ABNORMAL
LYMPHOCYTES # BLD: 2.6 K/UL (ref 0.9–3.6)
LYMPHOCYTES NFR BLD: 36 % (ref 21–52)
MAGNESIUM SERPL-MCNC: 1.3 MG/DL (ref 1.6–2.6)
MCH RBC QN AUTO: 30.9 PG (ref 24–34)
MCHC RBC AUTO-ENTMCNC: 34.2 G/DL (ref 31–37)
MCV RBC AUTO: 90.2 FL (ref 78–100)
MONOCYTES # BLD: 0.7 K/UL (ref 0.05–1.2)
MONOCYTES NFR BLD: 10 % (ref 3–10)
NEUTS SEG # BLD: 3.7 K/UL (ref 1.8–8)
NEUTS SEG NFR BLD: 51 % (ref 40–73)
NITRITE UR QL STRIP.AUTO: NEGATIVE
NRBC # BLD: 0 K/UL (ref 0–0.01)
NRBC BLD-RTO: 0 PER 100 WBC
PH UR STRIP: 6 (ref 5–8)
PLATELET # BLD AUTO: 148 K/UL (ref 135–420)
PMV BLD AUTO: 12.3 FL (ref 9.2–11.8)
POTASSIUM SERPL-SCNC: 2.9 MMOL/L (ref 3.5–5.5)
PROT SERPL-MCNC: 8.1 G/DL (ref 6.4–8.2)
PROT UR STRIP-MCNC: 30 MG/DL
RBC # BLD AUTO: 5.02 M/UL (ref 4.2–5.3)
RBC #/AREA URNS HPF: ABNORMAL /HPF (ref 0–2)
SODIUM SERPL-SCNC: 135 MMOL/L (ref 136–145)
SP GR UR REFRACTOMETRY: 1.01 (ref 1–1.03)
TSH SERPL DL<=0.05 MIU/L-ACNC: 2.88 UIU/ML (ref 0.36–3.74)
UROBILINOGEN UR QL STRIP.AUTO: 0.2 EU/DL (ref 0.2–1)
WBC # BLD AUTO: 7.3 K/UL (ref 4.6–13.2)
WBC URNS QL MICRO: ABNORMAL /HPF (ref 0–4)

## 2023-02-27 PROCEDURE — 81001 URINALYSIS AUTO W/SCOPE: CPT

## 2023-02-27 PROCEDURE — 6360000002 HC RX W HCPCS: Performed by: FAMILY MEDICINE

## 2023-02-27 PROCEDURE — 83605 ASSAY OF LACTIC ACID: CPT

## 2023-02-27 PROCEDURE — 96375 TX/PRO/DX INJ NEW DRUG ADDON: CPT

## 2023-02-27 PROCEDURE — 6370000000 HC RX 637 (ALT 250 FOR IP): Performed by: FAMILY MEDICINE

## 2023-02-27 PROCEDURE — 2580000003 HC RX 258: Performed by: FAMILY MEDICINE

## 2023-02-27 PROCEDURE — 70450 CT HEAD/BRAIN W/O DYE: CPT

## 2023-02-27 PROCEDURE — 96365 THER/PROPH/DIAG IV INF INIT: CPT

## 2023-02-27 RX ORDER — KETOROLAC TROMETHAMINE 30 MG/ML
15 INJECTION, SOLUTION INTRAMUSCULAR; INTRAVENOUS
Status: COMPLETED | OUTPATIENT
Start: 2023-02-27 | End: 2023-02-27

## 2023-02-27 RX ORDER — MAGNESIUM SULFATE IN WATER 40 MG/ML
2000 INJECTION, SOLUTION INTRAVENOUS ONCE
Status: COMPLETED | OUTPATIENT
Start: 2023-02-27 | End: 2023-02-27

## 2023-02-27 RX ORDER — SULFAMETHOXAZOLE AND TRIMETHOPRIM 800; 160 MG/1; MG/1
1 TABLET ORAL
Status: COMPLETED | OUTPATIENT
Start: 2023-02-27 | End: 2023-02-27

## 2023-02-27 RX ORDER — SULFAMETHOXAZOLE AND TRIMETHOPRIM 800; 160 MG/1; MG/1
1 TABLET ORAL 2 TIMES DAILY
Qty: 5 TABLET | Refills: 0 | Status: SHIPPED | OUTPATIENT
Start: 2023-02-27 | End: 2023-03-02

## 2023-02-27 RX ORDER — POTASSIUM CHLORIDE 750 MG/1
40 TABLET, EXTENDED RELEASE ORAL 2 TIMES DAILY
Status: DISCONTINUED | OUTPATIENT
Start: 2023-02-27 | End: 2023-02-27 | Stop reason: HOSPADM

## 2023-02-27 RX ORDER — POTASSIUM CHLORIDE 750 MG/1
10 TABLET, EXTENDED RELEASE ORAL 2 TIMES DAILY
Qty: 8 TABLET | Refills: 0 | Status: SHIPPED | OUTPATIENT
Start: 2023-02-27 | End: 2023-03-03

## 2023-02-27 RX ORDER — SULFAMETHOXAZOLE AND TRIMETHOPRIM 800; 160 MG/1; MG/1
1 TABLET ORAL EVERY 12 HOURS SCHEDULED
Status: DISCONTINUED | OUTPATIENT
Start: 2023-02-27 | End: 2023-02-27

## 2023-02-27 RX ADMIN — SULFAMETHOXAZOLE AND TRIMETHOPRIM 1 TABLET: 800; 160 TABLET ORAL at 02:44

## 2023-02-27 RX ADMIN — MAGNESIUM SULFATE HEPTAHYDRATE 2000 MG: 40 INJECTION, SOLUTION INTRAVENOUS at 01:20

## 2023-02-27 RX ADMIN — POTASSIUM CHLORIDE 40 MEQ: 750 TABLET, EXTENDED RELEASE ORAL at 01:19

## 2023-02-27 RX ADMIN — SODIUM CHLORIDE 500 ML: 9 INJECTION, SOLUTION INTRAVENOUS at 00:31

## 2023-02-27 RX ADMIN — KETOROLAC TROMETHAMINE 15 MG: 30 INJECTION, SOLUTION INTRAMUSCULAR at 00:42

## 2023-02-27 NOTE — DISCHARGE INSTRUCTIONS
As we spoke this is a multifaceted problem, the first is generalized dehydration. Imperative to drink more water as we spoke, please a half a gallon of water on the countertop and make sure it is gone by 4 PM.  Potassium a little bit low. I have written a prescription for some additional potassium. Now that her magnesium has been replenished. It may not fall as quickly as what it did this last time. Follow-up with a primary care doctor and have it retested in approximately 5 to 7 days. It also appears that there is a urinary tract infection. We will place her on some Bactrim first dose here next dose in approximately 12 hours. Return to the emergency department as any questions or concerns.

## 2023-02-27 NOTE — ED TRIAGE NOTES
Pt arrived via ems, states she has had dizziness, tremors, and a headache since 8pm. Pt was seen Friday and dx with a tia.

## 2023-02-27 NOTE — ED PROVIDER NOTES
[facility] EMERGENCY DEPT  EMERGENCY DEPARTMENT ENCOUNTER      Pt Name: Lc White  MRN: [id number]  [place] 1935  Date of evaluation: 2/26/2023  Provider: Alexei Serra DO    CHIEF COMPLAINT       Chief Complaint   Patient presents with    Dizziness                HISTORY OF PRESENT ILLNESS   (Location/Symptom, Timing/Onset, Context/Setting, Quality, Duration, Modifying Factors, Severity)  Note limiting factors. Lc White is a 82 y.o. female who presents to the emergency department patient brought in by EMS, patient states that she started to have some shaking and tremors when she just got out of the shower at about 8:00. She is going to state that she recently was diagnosed with a TIA. And was seen and hospitalized here on Friday. She does state that she has a slight headache. As well as some dizziness. She denies any chest pains or shortness of breath at this time. States that she has tremors but they are improved from where they were earlier today at 8:00. Nothing makes her dizziness better. She states that her headache is in the middle of her forehead. HPI    Nursing Notes were reviewed. REVIEW OF SYSTEMS    (2-9 systems for level 4, 10 or more for level 5)     Review of Systems    Except as noted above the remainder of the review of systems was reviewed and negative.        PAST MEDICAL HISTORY     Past Medical History:   Diagnosis Date    Cataract     CHF (congestive heart failure) ([address]     Claustrophobia     Crohn's disease ([address]     CVA (cerebral vascular accident) ([address]     DM (diabetes mellitus) ([address]     High cholesterol     HTN (hypertension)     Hypertensive emergency     Hypokalemia     Intractable headache     Ruptured ear drum     Syncope     Unsteady gait     UTI (urinary tract infection)          SURGICAL HISTORY       Past Surgical History:   Procedure Laterality Date    CHOLECYSTECTOMY      HYSTERECTOMY (CERVIX STATUS UNKNOWN)      THORACOTOMY CURRENT MEDICATIONS       Previous Medications    AMLODIPINE (NORVASC) 5 MG TABLET    Take 5 mg by mouth daily    ASPIRIN 81 MG EC TABLET    Take 81 mg by mouth daily    ATENOLOL (TENORMIN) 50 MG TABLET    Take 50 mg by mouth daily    ATORVASTATIN (LIPITOR) 40 MG TABLET    Take 40 mg by mouth daily    CLONIDINE (CATAPRES) 0.1 MG TABLET    Take 0.1 mg by mouth 2 times daily    GLIMEPIRIDE (AMARYL) 2 MG TABLET    Take 2 mg by mouth daily    HYDROCHLOROTHIAZIDE (HYDRODIURIL) 25 MG TABLET    Take 25 mg by mouth daily    INSULIN GLARGINE (LANTUS SOLOSTAR) 100 UNIT/ML INJECTION PEN    Inject 15 Units into the skin    ISOSORBIDE MONONITRATE (IMDUR) 30 MG EXTENDED RELEASE TABLET    Take 30 mg by mouth daily       ALLERGIES     Latex and Meperidine    FAMILY HISTORY       Family History   Problem Relation Age of Onset    No Known Problems Mother     No Known Problems Father     No Known Problems Sister     No Known Problems Brother     No Known Problems Maternal Aunt     No Known Problems Maternal Uncle     No Known Problems Paternal Aunt     No Known Problems Paternal Uncle     No Known Problems Maternal Grandmother     No Known Problems Maternal Grandfather     No Known Problems Paternal Grandmother     No Known Problems Paternal Grandfather     No Known Problems Maternal Cousin     No Known Problems Paternal Cousin     No Known Problems Other           SOCIAL HISTORY       Social History     Socioeconomic History    Marital status:      Spouse name: None    Number of children: None    Years of education: None    Highest education level: None   Tobacco Use    Smoking status: Unknown     Passive exposure: Never   Vaping Use    Vaping Use: Never used   Substance and Sexual Activity    Alcohol use: Not Currently    Drug use: Never       SCREENINGS   NIH Stroke Scale  Interval: Baseline  Level of Consciousness (1a): Alert  LOC Questions (1b):  Answers both correctly  LOC Commands (1c): Performs both tasks correctly  Best Gaze (2): Normal  Visual (3): No visual loss  Facial Palsy (4): Normal symmetrical movement  Motor Arm, Left (5a): No drift  Motor Arm, Right (5b): No drift  Motor Leg, Left (6a): No drift  Motor Leg, Right (6b): No drift  Limb Ataxia (7): Absent  Sensory (8): Normal  Best Language (9): No aphasia  Dysarthria (10): Normal  Extinction and Inattention (11): No abnormality  Total: 0     [place] Coma Scale  Eye Opening: Spontaneous  Best Verbal Response: Oriented  Best Motor Response: Obeys commands  [place] Coma Scale Score: 15                     CIWA Assessment  BP: (!) 153/60  Heart Rate: 70                 PHYSICAL EXAM    (up to 7 for level 4, 8 or more for level 5)     ED Triage Vitals   BP Temp Temp src Pulse Resp SpO2 Height Weight   -- -- -- -- -- -- -- --       Physical Exam    DIAGNOSTIC RESULTS     EKG: All EKG's are interpreted by the Emergency Department Physician who either signs or Co-signs this chart in the absence of a cardiologist.    EKG reviewed by myself shows a sinus rhythm rate 63 normal axis, normal transition, normal EKG.    RADIOLOGY:   Non-plain film images such as CT, Ultrasound and MRI are read by the radiologist. Plain radiographic images are visualized and preliminarily interpreted by the emergency physician with the below findings:    Interpretation per the Radiologist below, if available at the time of this note:    CT HEAD WO CONTRAST   Final Result      No acute process.            ED BEDSIDE ULTRASOUND:   Performed by ED Physician - none    LABS:  Labs Reviewed   URINALYSIS - Abnormal; Notable for the following components:       Result Value    Protein, UA 30 (*)     Leukocyte Esterase, Urine Moderate (*)     All other components within normal limits   CBC WITH AUTO DIFFERENTIAL - Abnormal; Notable for the following components:    Hematocrit 45.3 (*)     MPV 12.3 (*)     All other components within normal limits   LACTIC ACID - Abnormal; Notable for the following  components:    Lactic Acid, Plasma 2.3 (*)     All other components within normal limits   COMPREHENSIVE METABOLIC PANEL - Abnormal; Notable for the following components:    Sodium 135 (*)     Potassium 2.9 (*)     Chloride 96 (*)     Glucose 184 (*)     BUN 20 (*)     Est, Glom Filt Rate 46 (*)     Calcium 10.4 (*)     Globulin 4.3 (*)     All other components within normal limits   MAGNESIUM - Abnormal; Notable for the following components:    Magnesium 1.3 (*)     All other components within normal limits   URINALYSIS, MICRO - Abnormal; Notable for the following components:    BACTERIA, URINE 3+ (*)     All other components within normal limits   TSH   LACTIC ACID       All other labs were within normal range or not returned as of this dictation. EMERGENCY DEPARTMENT COURSE and DIFFERENTIAL DIAGNOSIS/MDM:   Vitals:    Vitals:    02/27/23 0003 02/27/23 0245   BP: (!) 165/87 (!) 153/60   Pulse: 65 70   Resp: 18 18   Temp: 98.4 °F (36.9 °C)    TempSrc: Oral    SpO2: 97% 98%   Weight: 150 lb (68 kg)            Medical Decision Making  Differential at this point is low suspicion for stroke as her stroke score is 0 I did review previous labs that show she had some potassium issues. I suspect that aching is probably from that. I did retest some labs showing her potassium as well. We checked a magnesium level and that is also low. In her previous record from the hospital stay there was some discussion about a possible UTI. However no antibiotics were sent home with the patient. It does appear that she does have a UTI. First dose of Bactrim here. This was discussed with her daughter and son-in-law. And it is imperative that they have close follow-up for her urinary tract infection but also potassium measure. The patient appears improved and much better than when she came in. Amount and/or Complexity of Data Reviewed  Labs: ordered. Radiology: ordered. ECG/medicine tests: ordered.     Risk  Prescription drug management. REASSESSMENT          CRITICAL CARE TIME   Total Critical Care time was  minutes, excluding separately reportable procedures. There was a high probability of clinically significant/life threatening deterioration in the patient's condition which required my urgent intervention. CONSULTS:  None    PROCEDURES:  Unless otherwise noted below, none     Procedures        FINAL IMPRESSION      1. Shaking    2. Urinary tract infection in female    3. Hypokalemia    4. Hypomagnesemia    5. Dehydration    6. Lactic acidosis          DISPOSITION/PLAN   DISPOSITION Decision To Discharge 02/27/2023 03:05:37 AM      PATIENT REFERRED TO:  Sejal Samano MD  [facility]  [phone]    Schedule an appointment as soon as possible for a visit       DISCHARGE MEDICATIONS:  New Prescriptions    POTASSIUM CHLORIDE (KLOR-CON M) 10 MEQ EXTENDED RELEASE TABLET    Take 1 tablet by mouth 2 times daily for 4 days    SULFAMETHOXAZOLE-TRIMETHOPRIM (BACTRIM DS) 800-160 MG PER TABLET    Take 1 tablet by mouth 2 times daily for 5 doses     Controlled Substances Monitoring:     No flowsheet data found.     (Please note that portions of this note were completed with a voice recognition program.  Efforts were made to edit the dictations but occasionally words are mis-transcribed.)    Cintia Baez DO (electronically signed)  Attending Emergency Physician           Cintia Baez DO  02/27/23 9460

## 2023-05-06 ENCOUNTER — APPOINTMENT (OUTPATIENT)
Age: 88
End: 2023-05-06
Payer: MEDICARE

## 2023-05-06 ENCOUNTER — HOSPITAL ENCOUNTER (EMERGENCY)
Age: 88
Discharge: HOME | End: 2023-05-06
Attending: INTERNAL MEDICINE
Payer: MEDICARE

## 2023-05-06 VITALS
RESPIRATION RATE: 18 BRPM | HEIGHT: 61 IN | WEIGHT: 150 LBS | HEART RATE: 62 BPM | DIASTOLIC BLOOD PRESSURE: 70 MMHG | TEMPERATURE: 98.3 F | SYSTOLIC BLOOD PRESSURE: 156 MMHG | BODY MASS INDEX: 28.32 KG/M2 | OXYGEN SATURATION: 96 %

## 2023-05-06 DIAGNOSIS — R53.1 GENERALIZED WEAKNESS: Primary | ICD-10-CM

## 2023-05-06 DIAGNOSIS — N39.0 URINARY TRACT INFECTION WITHOUT HEMATURIA, SITE UNSPECIFIED: ICD-10-CM

## 2023-05-06 LAB
ALBUMIN SERPL-MCNC: 3.8 G/DL (ref 3.4–5)
ALBUMIN/GLOB SERPL: 1 (ref 0.8–1.7)
ALP SERPL-CCNC: 55 U/L (ref 45–117)
ALT SERPL-CCNC: 24 U/L (ref 13–56)
ANION GAP SERPL CALC-SCNC: 8 MMOL/L (ref 3–18)
APPEARANCE UR: ABNORMAL
AST SERPL W P-5'-P-CCNC: 27 U/L (ref 10–38)
BACTERIA URNS QL MICRO: ABNORMAL /HPF
BASOPHILS # BLD: 0.1 K/UL (ref 0–0.1)
BASOPHILS NFR BLD: 1 % (ref 0–2)
BILIRUB SERPL-MCNC: 0.9 MG/DL (ref 0.2–1)
BILIRUB UR QL: NEGATIVE
BUN SERPL-MCNC: 18 MG/DL (ref 7–18)
BUN/CREAT SERPL: 14 (ref 12–20)
CA-I BLD-MCNC: 10.7 MG/DL (ref 8.5–10.1)
CHLORIDE SERPL-SCNC: 98 MMOL/L (ref 100–111)
CO2 SERPL-SCNC: 32 MMOL/L (ref 21–32)
COLOR UR: YELLOW
CREAT SERPL-MCNC: 1.25 MG/DL (ref 0.6–1.3)
DIFFERENTIAL METHOD BLD: ABNORMAL
EKG ATRIAL RATE: 66 BPM
EKG DIAGNOSIS: NORMAL
EKG P AXIS: 73 DEGREES
EKG Q-T INTERVAL: 424 MS
EKG QRS DURATION: 88 MS
EKG QTC CALCULATION (BAZETT): 440 MS
EKG R AXIS: 12 DEGREES
EKG T AXIS: 22 DEGREES
EKG VENTRICULAR RATE: 65 BPM
EOSINOPHIL # BLD: 0.1 K/UL (ref 0–0.4)
EOSINOPHIL NFR BLD: 1 % (ref 0–5)
EPITH CASTS URNS QL MICRO: ABNORMAL /LPF (ref 0–20)
ERYTHROCYTE [DISTWIDTH] IN BLOOD BY AUTOMATED COUNT: 13.2 % (ref 11.6–14.5)
GLOBULIN SER CALC-MCNC: 3.8 G/DL (ref 2–4)
GLUCOSE SERPL-MCNC: 204 MG/DL (ref 74–99)
GLUCOSE UR STRIP.AUTO-MCNC: NEGATIVE MG/DL
HCT VFR BLD AUTO: 44.7 % (ref 35–45)
HGB BLD-MCNC: 15 G/DL (ref 12–16)
HGB UR QL STRIP: NEGATIVE
IMM GRANULOCYTES # BLD AUTO: 0 K/UL (ref 0–0.04)
IMM GRANULOCYTES NFR BLD AUTO: 1 % (ref 0–0.5)
KETONES UR QL STRIP.AUTO: NEGATIVE MG/DL
LACTATE SERPL-SCNC: 1.7 MMOL/L (ref 0.4–2)
LEUKOCYTE ESTERASE UR QL STRIP.AUTO: ABNORMAL
LIPASE SERPL-CCNC: 107 U/L (ref 73–393)
LYMPHOCYTES # BLD: 2 K/UL (ref 0.9–3.6)
LYMPHOCYTES NFR BLD: 22 % (ref 21–52)
MAGNESIUM SERPL-MCNC: 1.4 MG/DL (ref 1.6–2.6)
MCH RBC QN AUTO: 31.1 PG (ref 24–34)
MCHC RBC AUTO-ENTMCNC: 33.6 G/DL (ref 31–37)
MCV RBC AUTO: 92.5 FL (ref 78–100)
MONOCYTES # BLD: 0.6 K/UL (ref 0.05–1.2)
MONOCYTES NFR BLD: 7 % (ref 3–10)
MUCOUS THREADS URNS QL MICRO: NEGATIVE /LPF
NEUTS SEG # BLD: 6 K/UL (ref 1.8–8)
NEUTS SEG NFR BLD: 68 % (ref 40–73)
NITRITE UR QL STRIP.AUTO: POSITIVE
NRBC # BLD: 0 K/UL (ref 0–0.01)
NRBC BLD-RTO: 0 PER 100 WBC
PH UR STRIP: 8 (ref 5–8)
PLATELET # BLD AUTO: 150 K/UL (ref 135–420)
PMV BLD AUTO: 12.8 FL (ref 9.2–11.8)
POTASSIUM SERPL-SCNC: 3.2 MMOL/L (ref 3.5–5.5)
PROT SERPL-MCNC: 7.6 G/DL (ref 6.4–8.2)
PROT UR STRIP-MCNC: NEGATIVE MG/DL
RBC # BLD AUTO: 4.83 M/UL (ref 4.2–5.3)
RBC #/AREA URNS HPF: ABNORMAL /HPF (ref 0–2)
SODIUM SERPL-SCNC: 138 MMOL/L (ref 136–145)
SP GR UR REFRACTOMETRY: 1.01 (ref 1–1.03)
TROPONIN I SERPL HS-MCNC: 104 NG/L (ref 0–54)
UROBILINOGEN UR QL STRIP.AUTO: 0.2 EU/DL (ref 0.2–1)
WBC # BLD AUTO: 8.7 K/UL (ref 4.6–13.2)
WBC URNS QL MICRO: ABNORMAL /HPF (ref 0–4)

## 2023-05-06 PROCEDURE — 84484 ASSAY OF TROPONIN QUANT: CPT

## 2023-05-06 PROCEDURE — 96365 THER/PROPH/DIAG IV INF INIT: CPT

## 2023-05-06 PROCEDURE — 93005 ELECTROCARDIOGRAM TRACING: CPT | Performed by: INTERNAL MEDICINE

## 2023-05-06 PROCEDURE — 83735 ASSAY OF MAGNESIUM: CPT

## 2023-05-06 PROCEDURE — 87186 SC STD MICRODIL/AGAR DIL: CPT

## 2023-05-06 PROCEDURE — 71045 X-RAY EXAM CHEST 1 VIEW: CPT

## 2023-05-06 PROCEDURE — 6360000002 HC RX W HCPCS: Performed by: INTERNAL MEDICINE

## 2023-05-06 PROCEDURE — 6370000000 HC RX 637 (ALT 250 FOR IP): Performed by: INTERNAL MEDICINE

## 2023-05-06 PROCEDURE — 85025 COMPLETE CBC W/AUTO DIFF WBC: CPT

## 2023-05-06 PROCEDURE — 81001 URINALYSIS AUTO W/SCOPE: CPT

## 2023-05-06 PROCEDURE — 83690 ASSAY OF LIPASE: CPT

## 2023-05-06 PROCEDURE — 99285 EMERGENCY DEPT VISIT HI MDM: CPT

## 2023-05-06 PROCEDURE — 80053 COMPREHEN METABOLIC PANEL: CPT

## 2023-05-06 PROCEDURE — 87086 URINE CULTURE/COLONY COUNT: CPT

## 2023-05-06 PROCEDURE — 83605 ASSAY OF LACTIC ACID: CPT

## 2023-05-06 PROCEDURE — 87077 CULTURE AEROBIC IDENTIFY: CPT

## 2023-05-06 PROCEDURE — 2580000003 HC RX 258: Performed by: INTERNAL MEDICINE

## 2023-05-06 RX ORDER — CEPHALEXIN 500 MG/1
500 CAPSULE ORAL 3 TIMES DAILY
Qty: 21 CAPSULE | Refills: 0 | Status: SHIPPED | OUTPATIENT
Start: 2023-05-06 | End: 2023-05-13

## 2023-05-06 RX ORDER — POTASSIUM CHLORIDE 750 MG/1
40 TABLET, EXTENDED RELEASE ORAL ONCE
Status: COMPLETED | OUTPATIENT
Start: 2023-05-06 | End: 2023-05-06

## 2023-05-06 RX ORDER — 0.9 % SODIUM CHLORIDE 0.9 %
1000 INTRAVENOUS SOLUTION INTRAVENOUS ONCE
Status: COMPLETED | OUTPATIENT
Start: 2023-05-06 | End: 2023-05-06

## 2023-05-06 RX ADMIN — SODIUM CHLORIDE 1000 ML: 9 INJECTION, SOLUTION INTRAVENOUS at 15:39

## 2023-05-06 RX ADMIN — CEFTRIAXONE SODIUM 1000 MG: 1 INJECTION, POWDER, FOR SOLUTION INTRAMUSCULAR; INTRAVENOUS at 17:37

## 2023-05-06 RX ADMIN — POTASSIUM CHLORIDE 40 MEQ: 750 TABLET, EXTENDED RELEASE ORAL at 17:37

## 2023-05-06 ASSESSMENT — PAIN SCALES - GENERAL
PAINLEVEL_OUTOF10: 0
PAINLEVEL_OUTOF10: 3

## 2023-05-06 ASSESSMENT — ENCOUNTER SYMPTOMS
DIARRHEA: 0
COUGH: 0
SHORTNESS OF BREATH: 0
VOMITING: 0
ABDOMINAL PAIN: 0
TROUBLE SWALLOWING: 0
NAUSEA: 0

## 2023-05-06 ASSESSMENT — PAIN - FUNCTIONAL ASSESSMENT
PAIN_FUNCTIONAL_ASSESSMENT: NONE - DENIES PAIN
PAIN_FUNCTIONAL_ASSESSMENT: NONE - DENIES PAIN
PAIN_FUNCTIONAL_ASSESSMENT: 0-10
PAIN_FUNCTIONAL_ASSESSMENT: NONE - DENIES PAIN

## 2023-05-06 ASSESSMENT — PAIN DESCRIPTION - LOCATION: LOCATION: BREAST

## 2023-05-06 ASSESSMENT — PAIN DESCRIPTION - ORIENTATION: ORIENTATION: LEFT

## 2023-05-06 NOTE — ED TRIAGE NOTES
Blood pressure concerns, dizziness, diarrhea x 3 today, took imodium     Dizziness worse when standing up

## 2023-05-06 NOTE — ED PROVIDER NOTES
[facility] EMERGENCY DEPT  EMERGENCY DEPARTMENT ENCOUNTER      Pt Name: Marybeth Dominguez  MRN: [id number]  [place] 1935  Date of evaluation: 5/6/2023  Provider: Carlie Hanson MD    [address]       Chief Complaint   Patient presents with    Dizziness     Blood pressure concerns, dizziness, diarrhea x 3 today, took imodium          HISTORY OF PRESENT ILLNESS   (Location/Symptom, Timing/Onset, Context/Setting, Quality, Duration, Modifying Factors, Severity)  Note limiting factors. Marybeth Dominguez is a 82 y.o. female who presents to the emergency department      37-year-old white female that states that she woke up this morning and was feeling good but as she moved around she started developing generalized weakness after having diarrhea. She states that she went to a craft fair and started feeling dizzy like she was going to pass out. She sat down in a chair and drank a half a bottle of water. She was put in the car and states that she continued to feel weak so she asked her daughter to bring her to the emergency room. She states that she has a chronic left chest pain when she lays down at night and had a had a cardiac catheterization 2/8/23 which showed no significant blockages. She is referred to a new cardiologist next week. .  This morning when she woke up she also noted that she had diarrhea, no melena. PCP: Jas Urban. Patient was admitted 2/24/2023 for a left posterior cerebral artery infarct, elevated troponin, hyperlipidemia, diabetes, CHF, Crohn's disease, postop cholecystectomy. Allergies: Meperidine    The history is provided by the patient and a relative. Nursing Notes were reviewed. REVIEW OF SYSTEMS    (2-9 systems for level 4, 10 or more for level 5)     Review of Systems   Constitutional:  Negative for chills and fever. HENT:  Negative for trouble swallowing. Eyes:  Negative for visual disturbance. Respiratory:  Negative for cough and shortness of breath.

## 2023-05-07 LAB
BACTERIA SPEC CULT: ABNORMAL
COLONY COUNT, CNT: ABNORMAL
EKG ATRIAL RATE: 66 BPM
EKG DIAGNOSIS: NORMAL
EKG P AXIS: -14 DEGREES
EKG P-R INTERVAL: 194 MS
EKG Q-T INTERVAL: 424 MS
EKG QRS DURATION: 88 MS
EKG QTC CALCULATION (BAZETT): 444 MS
EKG R AXIS: 14 DEGREES
EKG T AXIS: 28 DEGREES
EKG VENTRICULAR RATE: 66 BPM
Lab: ABNORMAL

## 2023-05-08 LAB
BACTERIA SPEC CULT: ABNORMAL
COLONY COUNT, CNT: ABNORMAL
Lab: ABNORMAL

## 2023-05-24 RX ORDER — ASPIRIN 81 MG/1
81 TABLET ORAL DAILY
COMMUNITY

## 2023-05-24 RX ORDER — ISOSORBIDE MONONITRATE 30 MG/1
30 TABLET, EXTENDED RELEASE ORAL DAILY
COMMUNITY
Start: 2022-12-22

## 2023-05-25 DIAGNOSIS — I10 PRIMARY HYPERTENSION: Primary | ICD-10-CM

## 2023-09-04 ENCOUNTER — HOSPITAL ENCOUNTER (EMERGENCY)
Age: 88
Discharge: HOME | End: 2023-09-04
Attending: EMERGENCY MEDICINE
Payer: MEDICARE

## 2023-09-04 VITALS
TEMPERATURE: 98.2 F | BODY MASS INDEX: 28.51 KG/M2 | HEIGHT: 61 IN | RESPIRATION RATE: 17 BRPM | WEIGHT: 151 LBS | OXYGEN SATURATION: 97 % | DIASTOLIC BLOOD PRESSURE: 85 MMHG | SYSTOLIC BLOOD PRESSURE: 167 MMHG | HEART RATE: 65 BPM

## 2023-09-04 DIAGNOSIS — N39.0 URINARY TRACT INFECTION WITHOUT HEMATURIA, SITE UNSPECIFIED: Primary | ICD-10-CM

## 2023-09-04 LAB
ALBUMIN SERPL-MCNC: 3.7 G/DL (ref 3.4–5)
ALBUMIN/GLOB SERPL: 1 (ref 0.8–1.7)
ALP SERPL-CCNC: 47 U/L (ref 45–117)
ALT SERPL-CCNC: 24 U/L (ref 13–56)
ANION GAP SERPL CALC-SCNC: 8 MMOL/L (ref 3–18)
APPEARANCE UR: ABNORMAL
AST SERPL W P-5'-P-CCNC: 27 U/L (ref 10–38)
BACTERIA URNS QL MICRO: ABNORMAL /HPF
BASOPHILS # BLD: 0.1 K/UL (ref 0–0.1)
BASOPHILS NFR BLD: 1 % (ref 0–2)
BILIRUB SERPL-MCNC: 0.6 MG/DL (ref 0.2–1)
BILIRUB UR QL: NEGATIVE
BUN SERPL-MCNC: 17 MG/DL (ref 7–18)
BUN/CREAT SERPL: 13 (ref 12–20)
CA-I BLD-MCNC: 10.1 MG/DL (ref 8.5–10.1)
CHLORIDE SERPL-SCNC: 90 MMOL/L (ref 100–111)
CO2 SERPL-SCNC: 30 MMOL/L (ref 21–32)
COLOR UR: YELLOW
CREAT SERPL-MCNC: 1.33 MG/DL (ref 0.6–1.3)
DIFFERENTIAL METHOD BLD: ABNORMAL
EOSINOPHIL # BLD: 0.1 K/UL (ref 0–0.4)
EOSINOPHIL NFR BLD: 1 % (ref 0–5)
EPITH CASTS URNS QL MICRO: ABNORMAL /LPF (ref 0–20)
ERYTHROCYTE [DISTWIDTH] IN BLOOD BY AUTOMATED COUNT: 13.6 % (ref 11.6–14.5)
GLOBULIN SER CALC-MCNC: 3.7 G/DL (ref 2–4)
GLUCOSE SERPL-MCNC: 138 MG/DL (ref 74–99)
GLUCOSE UR STRIP.AUTO-MCNC: NEGATIVE MG/DL
HCT VFR BLD AUTO: 40.4 % (ref 35–45)
HGB BLD-MCNC: 14.1 G/DL (ref 12–16)
HGB UR QL STRIP: NEGATIVE
IMM GRANULOCYTES # BLD AUTO: 0.1 K/UL (ref 0–0.04)
IMM GRANULOCYTES NFR BLD AUTO: 1 % (ref 0–0.5)
KETONES UR QL STRIP.AUTO: NEGATIVE MG/DL
LEUKOCYTE ESTERASE UR QL STRIP.AUTO: ABNORMAL
LIPASE SERPL-CCNC: 95 U/L (ref 73–393)
LYMPHOCYTES # BLD: 1.9 K/UL (ref 0.9–3.6)
LYMPHOCYTES NFR BLD: 28 % (ref 21–52)
MAGNESIUM SERPL-MCNC: 1.5 MG/DL (ref 1.6–2.6)
MCH RBC QN AUTO: 32.2 PG (ref 24–34)
MCHC RBC AUTO-ENTMCNC: 34.9 G/DL (ref 31–37)
MCV RBC AUTO: 92.2 FL (ref 78–100)
MONOCYTES # BLD: 0.7 K/UL (ref 0.05–1.2)
MONOCYTES NFR BLD: 10 % (ref 3–10)
NEUTS SEG # BLD: 4 K/UL (ref 1.8–8)
NEUTS SEG NFR BLD: 59 % (ref 40–73)
NITRITE UR QL STRIP.AUTO: NEGATIVE
NRBC # BLD: 0 K/UL (ref 0–0.01)
NRBC BLD-RTO: 0 PER 100 WBC
PH UR STRIP: 7.5 (ref 5–8)
PLATELET # BLD AUTO: 170 K/UL (ref 135–420)
PMV BLD AUTO: 11.4 FL (ref 9.2–11.8)
POTASSIUM SERPL-SCNC: 3.5 MMOL/L (ref 3.5–5.5)
PROT SERPL-MCNC: 7.4 G/DL (ref 6.4–8.2)
PROT UR STRIP-MCNC: NEGATIVE MG/DL
RBC # BLD AUTO: 4.38 M/UL (ref 4.2–5.3)
RBC #/AREA URNS HPF: ABNORMAL /HPF (ref 0–2)
SODIUM SERPL-SCNC: 128 MMOL/L (ref 136–145)
SP GR UR REFRACTOMETRY: 1.01 (ref 1–1.03)
TROPONIN I SERPL HS-MCNC: 43 NG/L (ref 0–54)
TROPONIN I SERPL HS-MCNC: 49 NG/L (ref 0–54)
UROBILINOGEN UR QL STRIP.AUTO: 0.2 EU/DL (ref 0.2–1)
WBC # BLD AUTO: 6.8 K/UL (ref 4.6–13.2)
WBC URNS QL MICRO: ABNORMAL /HPF (ref 0–4)

## 2023-09-04 PROCEDURE — 2580000003 HC RX 258: Performed by: EMERGENCY MEDICINE

## 2023-09-04 PROCEDURE — 87086 URINE CULTURE/COLONY COUNT: CPT

## 2023-09-04 PROCEDURE — 83690 ASSAY OF LIPASE: CPT

## 2023-09-04 PROCEDURE — 84484 ASSAY OF TROPONIN QUANT: CPT

## 2023-09-04 PROCEDURE — 96361 HYDRATE IV INFUSION ADD-ON: CPT

## 2023-09-04 PROCEDURE — 87186 SC STD MICRODIL/AGAR DIL: CPT

## 2023-09-04 PROCEDURE — 81001 URINALYSIS AUTO W/SCOPE: CPT

## 2023-09-04 PROCEDURE — 80053 COMPREHEN METABOLIC PANEL: CPT

## 2023-09-04 PROCEDURE — 93005 ELECTROCARDIOGRAM TRACING: CPT | Performed by: EMERGENCY MEDICINE

## 2023-09-04 PROCEDURE — 83735 ASSAY OF MAGNESIUM: CPT

## 2023-09-04 PROCEDURE — 99284 EMERGENCY DEPT VISIT MOD MDM: CPT

## 2023-09-04 PROCEDURE — 96360 HYDRATION IV INFUSION INIT: CPT

## 2023-09-04 PROCEDURE — 85025 COMPLETE CBC W/AUTO DIFF WBC: CPT

## 2023-09-04 PROCEDURE — 87077 CULTURE AEROBIC IDENTIFY: CPT

## 2023-09-04 RX ORDER — SODIUM CHLORIDE, SODIUM LACTATE, POTASSIUM CHLORIDE, AND CALCIUM CHLORIDE .6; .31; .03; .02 G/100ML; G/100ML; G/100ML; G/100ML
1000 INJECTION, SOLUTION INTRAVENOUS ONCE
Status: COMPLETED | OUTPATIENT
Start: 2023-09-04 | End: 2023-09-04

## 2023-09-04 RX ORDER — CEPHALEXIN 500 MG/1
500 CAPSULE ORAL 2 TIMES DAILY
Qty: 14 CAPSULE | Refills: 0 | Status: SHIPPED | OUTPATIENT
Start: 2023-09-04 | End: 2023-09-11

## 2023-09-04 RX ADMIN — SODIUM CHLORIDE, POTASSIUM CHLORIDE, SODIUM LACTATE AND CALCIUM CHLORIDE 1000 ML: 600; 310; 30; 20 INJECTION, SOLUTION INTRAVENOUS at 12:10

## 2023-09-04 ASSESSMENT — PAIN DESCRIPTION - DESCRIPTORS: DESCRIPTORS: TIGHTNESS

## 2023-09-04 ASSESSMENT — PAIN DESCRIPTION - PAIN TYPE: TYPE: ACUTE PAIN

## 2023-09-04 ASSESSMENT — PAIN DESCRIPTION - LOCATION: LOCATION: CHEST

## 2023-09-04 ASSESSMENT — PAIN - FUNCTIONAL ASSESSMENT: PAIN_FUNCTIONAL_ASSESSMENT: 0-10

## 2023-09-04 ASSESSMENT — PAIN SCALES - GENERAL: PAINLEVEL_OUTOF10: 6

## 2023-09-04 NOTE — ED TRIAGE NOTES
Pt reports off and on again chest tightness, burping and belching along with diarrhea off and on for four days.

## 2023-09-04 NOTE — ED NOTES
Pt ambulated in hallway using her cane, reports some slight dizziness upon standing that resolved in about 30 min. Pt ambulated in the hallway with no other complaints.       Mague Yip RN  09/04/23 4304

## 2023-09-04 NOTE — ED PROVIDER NOTES
[facility] EMERGENCY DEPT  EMERGENCY DEPARTMENT ENCOUNTER      Pt Name: Thu Monk  MRN: [id number]  [address] [facility] 1935  Date of evaluation: 9/4/2023  Provider: Natalie Conteh MD    CHIEF COMPLAINT       Chief Complaint   Patient presents with    Chest Pain         HISTORY OF PRESENT ILLNESS   (Location/Symptom, Timing/Onset, Context/Setting, Quality, Duration, Modifying Factors, Severity)  Note limiting factors. Thu Monk is a 82 y.o. female with past medical history significant for diabetes, hypertension, high cholesterol who presents to the emergency department for delayed evaluation of loose stools and fatigue as well as lightheadedness for the last 4 days. Gradual onset and unchanged in character or severity. Several loose brown stools throughout the day without recent antibiotics or antipyretics, systemic symptoms, foreign travel, new or bad foods. No alleviating factors attempted. She denies any actual chest tightness or chest pain to me during our history taking. The history is provided by the patient, a relative and medical records. Nursing Notes were reviewed. REVIEW OF SYSTEMS    (2-9 systems for level 4, 10 or more for level 5)     Review of Systems   All other systems reviewed and are negative. Except as noted above the remainder of the review of systems was reviewed and negative.        PAST MEDICAL HISTORY     Past Medical History:   Diagnosis Date    Cataract     CHF (congestive heart failure) ([address])     Claustrophobia     Crohn's disease ([address])     CVA (cerebral vascular accident) ([address])     DM (diabetes mellitus) ([address])     High cholesterol     HTN (hypertension)     Hypertensive emergency     Hypokalemia     Intractable headache     Ruptured ear drum     Syncope     Unsteady gait     UTI (urinary tract infection)          SURGICAL HISTORY       Past Surgical History:   Procedure Laterality Date    CHOLECYSTECTOMY      HYSTERECTOMY (CERVIX STATUS UNKNOWN)      THORACOTOMY

## 2023-09-04 NOTE — DISCHARGE INSTRUCTIONS
Take a probiotic such as Culturelle or Florastor more than 1 hour before the morning dose to help prevent diarrhea. Please follow-up with your primary care provider.

## 2023-09-05 LAB
EKG ATRIAL RATE: 61 BPM
EKG DIAGNOSIS: NORMAL
EKG P AXIS: 56 DEGREES
EKG P-R INTERVAL: 206 MS
EKG Q-T INTERVAL: 404 MS
EKG QRS DURATION: 82 MS
EKG QTC CALCULATION (BAZETT): 406 MS
EKG R AXIS: 14 DEGREES
EKG T AXIS: 35 DEGREES
EKG VENTRICULAR RATE: 61 BPM

## 2023-09-06 LAB
BACTERIA SPEC CULT: ABNORMAL
COLONY COUNT, CNT: ABNORMAL
Lab: ABNORMAL

## 2023-09-08 ENCOUNTER — HOSPITAL ENCOUNTER (OUTPATIENT)
Age: 88
Setting detail: OBSERVATION
Discharge: HOME | End: 2023-09-10
Attending: INTERNAL MEDICINE | Admitting: INTERNAL MEDICINE
Payer: MEDICARE

## 2023-09-08 ENCOUNTER — APPOINTMENT (OUTPATIENT)
Age: 88
End: 2023-09-08
Payer: MEDICARE

## 2023-09-08 DIAGNOSIS — R07.9 CHEST PAIN, UNSPECIFIED TYPE: Primary | ICD-10-CM

## 2023-09-08 PROBLEM — E11.9 DIABETES MELLITUS TYPE 2, CONTROLLED (HCC): Status: ACTIVE | Noted: 2022-11-18

## 2023-09-08 PROBLEM — I10 HYPERTENSION: Status: ACTIVE | Noted: 2022-11-18

## 2023-09-08 LAB
ALBUMIN SERPL-MCNC: 3.3 G/DL (ref 3.4–5)
ALBUMIN/GLOB SERPL: 0.9 (ref 0.8–1.7)
ALP SERPL-CCNC: 48 U/L (ref 45–117)
ALT SERPL-CCNC: 25 U/L (ref 13–56)
ANION GAP SERPL CALC-SCNC: 10 MMOL/L (ref 3–18)
AST SERPL W P-5'-P-CCNC: 34 U/L (ref 10–38)
BASOPHILS # BLD: 0.1 K/UL (ref 0–0.1)
BASOPHILS NFR BLD: 1 % (ref 0–2)
BILIRUB SERPL-MCNC: 0.6 MG/DL (ref 0.2–1)
BNP SERPL-MCNC: 205 PG/ML (ref 0–1800)
BUN SERPL-MCNC: 12 MG/DL (ref 7–18)
BUN/CREAT SERPL: 11 (ref 12–20)
CA-I BLD-MCNC: 9.4 MG/DL (ref 8.5–10.1)
CHLORIDE SERPL-SCNC: 97 MMOL/L (ref 100–111)
CO2 SERPL-SCNC: 24 MMOL/L (ref 21–32)
CREAT SERPL-MCNC: 1.06 MG/DL (ref 0.6–1.3)
D DIMER PPP FEU-MCNC: 0.85 UG/ML(FEU)
DIFFERENTIAL METHOD BLD: ABNORMAL
EOSINOPHIL # BLD: 0.2 K/UL (ref 0–0.4)
EOSINOPHIL NFR BLD: 3 % (ref 0–5)
ERYTHROCYTE [DISTWIDTH] IN BLOOD BY AUTOMATED COUNT: 13.9 % (ref 11.6–14.5)
GLOBULIN SER CALC-MCNC: 3.5 G/DL (ref 2–4)
GLUCOSE BLD STRIP.AUTO-MCNC: 107 MG/DL (ref 70–110)
GLUCOSE SERPL-MCNC: 106 MG/DL (ref 74–99)
HCT VFR BLD AUTO: 39.5 % (ref 35–45)
HGB BLD-MCNC: 13.2 G/DL (ref 12–16)
IMM GRANULOCYTES # BLD AUTO: 0 K/UL
IMM GRANULOCYTES NFR BLD AUTO: 0 %
LYMPHOCYTES # BLD: 1.7 K/UL (ref 0.9–3.6)
LYMPHOCYTES NFR BLD: 33 % (ref 21–52)
MCH RBC QN AUTO: 32.8 PG (ref 24–34)
MCHC RBC AUTO-ENTMCNC: 33.4 G/DL (ref 31–37)
MCV RBC AUTO: 98.3 FL (ref 78–100)
MONOCYTES # BLD: 0.5 K/UL (ref 0.05–1.2)
MONOCYTES NFR BLD: 10 % (ref 3–10)
NEUTS SEG # BLD: 2.5 K/UL (ref 1.8–8)
NEUTS SEG NFR BLD: 53 % (ref 40–73)
NRBC # BLD: 0 K/UL (ref 0–0.01)
NRBC BLD-RTO: 0 PER 100 WBC
PERFORMED BY:: NORMAL
PLATELET # BLD AUTO: 139 K/UL (ref 135–420)
PLATELET COMMENT: ABNORMAL
PMV BLD AUTO: 11.6 FL (ref 9.2–11.8)
POTASSIUM SERPL-SCNC: 3.4 MMOL/L (ref 3.5–5.5)
PROT SERPL-MCNC: 6.8 G/DL (ref 6.4–8.2)
RBC # BLD AUTO: 4.02 M/UL (ref 4.2–5.3)
RBC MORPH BLD: ABNORMAL
SODIUM SERPL-SCNC: 131 MMOL/L (ref 136–145)
TROPONIN I SERPL HS-MCNC: 63 NG/L (ref 0–54)
TROPONIN I SERPL HS-MCNC: 77 NG/L (ref 0–54)
WBC # BLD AUTO: 5 K/UL (ref 4.6–13.2)

## 2023-09-08 PROCEDURE — 85379 FIBRIN DEGRADATION QUANT: CPT

## 2023-09-08 PROCEDURE — 96375 TX/PRO/DX INJ NEW DRUG ADDON: CPT

## 2023-09-08 PROCEDURE — G0378 HOSPITAL OBSERVATION PER HR: HCPCS

## 2023-09-08 PROCEDURE — 93005 ELECTROCARDIOGRAM TRACING: CPT | Performed by: INTERNAL MEDICINE

## 2023-09-08 PROCEDURE — 6370000000 HC RX 637 (ALT 250 FOR IP): Performed by: INTERNAL MEDICINE

## 2023-09-08 PROCEDURE — 6370000000 HC RX 637 (ALT 250 FOR IP): Performed by: NURSE PRACTITIONER

## 2023-09-08 PROCEDURE — 96372 THER/PROPH/DIAG INJ SC/IM: CPT

## 2023-09-08 PROCEDURE — 6360000002 HC RX W HCPCS: Performed by: NURSE PRACTITIONER

## 2023-09-08 PROCEDURE — 36415 COLL VENOUS BLD VENIPUNCTURE: CPT

## 2023-09-08 PROCEDURE — 83880 ASSAY OF NATRIURETIC PEPTIDE: CPT

## 2023-09-08 PROCEDURE — 84484 ASSAY OF TROPONIN QUANT: CPT

## 2023-09-08 PROCEDURE — 80053 COMPREHEN METABOLIC PANEL: CPT

## 2023-09-08 PROCEDURE — 2580000003 HC RX 258: Performed by: NURSE PRACTITIONER

## 2023-09-08 PROCEDURE — 99285 EMERGENCY DEPT VISIT HI MDM: CPT

## 2023-09-08 PROCEDURE — 71045 X-RAY EXAM CHEST 1 VIEW: CPT

## 2023-09-08 PROCEDURE — 82962 GLUCOSE BLOOD TEST: CPT

## 2023-09-08 PROCEDURE — 85025 COMPLETE CBC W/AUTO DIFF WBC: CPT

## 2023-09-08 RX ORDER — ENOXAPARIN SODIUM 100 MG/ML
40 INJECTION SUBCUTANEOUS DAILY
Status: DISCONTINUED | OUTPATIENT
Start: 2023-09-08 | End: 2023-09-10 | Stop reason: HOSPADM

## 2023-09-08 RX ORDER — ATENOLOL 25 MG/1
50 TABLET ORAL DAILY
Status: DISCONTINUED | OUTPATIENT
Start: 2023-09-09 | End: 2023-09-10 | Stop reason: HOSPADM

## 2023-09-08 RX ORDER — NITROGLYCERIN 0.4 MG/1
0.4 TABLET SUBLINGUAL EVERY 5 MIN PRN
Status: DISCONTINUED | OUTPATIENT
Start: 2023-09-08 | End: 2023-09-10 | Stop reason: HOSPADM

## 2023-09-08 RX ORDER — OMEPRAZOLE 20 MG/1
20 CAPSULE, DELAYED RELEASE ORAL DAILY
COMMUNITY
Start: 2023-09-04

## 2023-09-08 RX ORDER — DEXTROSE MONOHYDRATE 100 MG/ML
INJECTION, SOLUTION INTRAVENOUS CONTINUOUS PRN
Status: DISCONTINUED | OUTPATIENT
Start: 2023-09-08 | End: 2023-09-10 | Stop reason: HOSPADM

## 2023-09-08 RX ORDER — MAGNESIUM HYDROXIDE/ALUMINUM HYDROXICE/SIMETHICONE 120; 1200; 1200 MG/30ML; MG/30ML; MG/30ML
30 SUSPENSION ORAL EVERY 6 HOURS PRN
Status: DISCONTINUED | OUTPATIENT
Start: 2023-09-08 | End: 2023-09-10 | Stop reason: HOSPADM

## 2023-09-08 RX ORDER — ONDANSETRON 2 MG/ML
4 INJECTION INTRAMUSCULAR; INTRAVENOUS EVERY 6 HOURS PRN
Status: DISCONTINUED | OUTPATIENT
Start: 2023-09-08 | End: 2023-09-10 | Stop reason: HOSPADM

## 2023-09-08 RX ORDER — POLYETHYLENE GLYCOL 3350 17 G/17G
17 POWDER, FOR SOLUTION ORAL DAILY PRN
Status: DISCONTINUED | OUTPATIENT
Start: 2023-09-08 | End: 2023-09-10 | Stop reason: HOSPADM

## 2023-09-08 RX ORDER — HYDRALAZINE HYDROCHLORIDE 20 MG/ML
10 INJECTION INTRAMUSCULAR; INTRAVENOUS ONCE
Status: COMPLETED | OUTPATIENT
Start: 2023-09-08 | End: 2023-09-08

## 2023-09-08 RX ORDER — HYDRALAZINE HYDROCHLORIDE 20 MG/ML
10 INJECTION INTRAMUSCULAR; INTRAVENOUS EVERY 6 HOURS PRN
Status: DISCONTINUED | OUTPATIENT
Start: 2023-09-08 | End: 2023-09-10 | Stop reason: HOSPADM

## 2023-09-08 RX ORDER — PANTOPRAZOLE SODIUM 40 MG/1
40 TABLET, DELAYED RELEASE ORAL
Status: DISCONTINUED | OUTPATIENT
Start: 2023-09-09 | End: 2023-09-10 | Stop reason: HOSPADM

## 2023-09-08 RX ORDER — ONDANSETRON 4 MG/1
4 TABLET, ORALLY DISINTEGRATING ORAL EVERY 8 HOURS PRN
Status: DISCONTINUED | OUTPATIENT
Start: 2023-09-08 | End: 2023-09-10 | Stop reason: HOSPADM

## 2023-09-08 RX ORDER — ACETAMINOPHEN 650 MG/1
650 SUPPOSITORY RECTAL EVERY 6 HOURS PRN
Status: DISCONTINUED | OUTPATIENT
Start: 2023-09-08 | End: 2023-09-10 | Stop reason: HOSPADM

## 2023-09-08 RX ORDER — ATORVASTATIN CALCIUM 40 MG/1
40 TABLET, FILM COATED ORAL NIGHTLY
Status: DISCONTINUED | OUTPATIENT
Start: 2023-09-08 | End: 2023-09-10 | Stop reason: HOSPADM

## 2023-09-08 RX ORDER — MORPHINE SULFATE 2 MG/ML
1 INJECTION, SOLUTION INTRAMUSCULAR; INTRAVENOUS EVERY 4 HOURS PRN
Status: DISCONTINUED | OUTPATIENT
Start: 2023-09-08 | End: 2023-09-10 | Stop reason: HOSPADM

## 2023-09-08 RX ORDER — INSULIN LISPRO 100 [IU]/ML
0-4 INJECTION, SOLUTION INTRAVENOUS; SUBCUTANEOUS
Status: DISCONTINUED | OUTPATIENT
Start: 2023-09-09 | End: 2023-09-10 | Stop reason: HOSPADM

## 2023-09-08 RX ORDER — SODIUM CHLORIDE 0.9 % (FLUSH) 0.9 %
5-40 SYRINGE (ML) INJECTION PRN
Status: DISCONTINUED | OUTPATIENT
Start: 2023-09-08 | End: 2023-09-09

## 2023-09-08 RX ORDER — VALSARTAN 80 MG/1
80 TABLET ORAL DAILY
COMMUNITY
Start: 2023-09-05

## 2023-09-08 RX ORDER — ISOSORBIDE MONONITRATE 30 MG/1
30 TABLET, EXTENDED RELEASE ORAL DAILY
Status: DISCONTINUED | OUTPATIENT
Start: 2023-09-09 | End: 2023-09-10 | Stop reason: HOSPADM

## 2023-09-08 RX ORDER — ACETAMINOPHEN 325 MG/1
650 TABLET ORAL EVERY 6 HOURS PRN
Status: DISCONTINUED | OUTPATIENT
Start: 2023-09-08 | End: 2023-09-10 | Stop reason: HOSPADM

## 2023-09-08 RX ORDER — INSULIN GLARGINE 100 [IU]/ML
15 INJECTION, SOLUTION SUBCUTANEOUS NIGHTLY
Status: DISCONTINUED | OUTPATIENT
Start: 2023-09-08 | End: 2023-09-10 | Stop reason: HOSPADM

## 2023-09-08 RX ORDER — ASPIRIN 81 MG/1
81 TABLET, CHEWABLE ORAL DAILY
Status: DISCONTINUED | OUTPATIENT
Start: 2023-09-09 | End: 2023-09-10 | Stop reason: HOSPADM

## 2023-09-08 RX ORDER — ATORVASTATIN CALCIUM 10 MG/1
40 TABLET, FILM COATED ORAL NIGHTLY
Status: DISCONTINUED | OUTPATIENT
Start: 2023-09-08 | End: 2023-09-08

## 2023-09-08 RX ORDER — CEPHALEXIN 250 MG/1
500 CAPSULE ORAL 2 TIMES DAILY
Status: DISCONTINUED | OUTPATIENT
Start: 2023-09-09 | End: 2023-09-10 | Stop reason: HOSPADM

## 2023-09-08 RX ORDER — HYDROCHLOROTHIAZIDE 25 MG/1
25 TABLET ORAL DAILY
Status: DISCONTINUED | OUTPATIENT
Start: 2023-09-08 | End: 2023-09-10 | Stop reason: HOSPADM

## 2023-09-08 RX ORDER — VALSARTAN 80 MG/1
80 TABLET ORAL DAILY
Status: DISCONTINUED | OUTPATIENT
Start: 2023-09-09 | End: 2023-09-10 | Stop reason: HOSPADM

## 2023-09-08 RX ORDER — POTASSIUM CHLORIDE 750 MG/1
20 TABLET, EXTENDED RELEASE ORAL ONCE
Status: COMPLETED | OUTPATIENT
Start: 2023-09-08 | End: 2023-09-08

## 2023-09-08 RX ORDER — SODIUM CHLORIDE 0.9 % (FLUSH) 0.9 %
5-40 SYRINGE (ML) INJECTION EVERY 12 HOURS SCHEDULED
Status: DISCONTINUED | OUTPATIENT
Start: 2023-09-08 | End: 2023-09-09

## 2023-09-08 RX ORDER — SERTRALINE HYDROCHLORIDE 25 MG/1
25 TABLET, FILM COATED ORAL NIGHTLY
COMMUNITY
Start: 2023-06-14

## 2023-09-08 RX ORDER — INSULIN LISPRO 100 [IU]/ML
0-4 INJECTION, SOLUTION INTRAVENOUS; SUBCUTANEOUS NIGHTLY
Status: DISCONTINUED | OUTPATIENT
Start: 2023-09-08 | End: 2023-09-10 | Stop reason: HOSPADM

## 2023-09-08 RX ADMIN — INSULIN GLARGINE 15 UNITS: 100 INJECTION, SOLUTION SUBCUTANEOUS at 20:55

## 2023-09-08 RX ADMIN — POTASSIUM CHLORIDE 20 MEQ: 750 TABLET, EXTENDED RELEASE ORAL at 20:54

## 2023-09-08 RX ADMIN — ENOXAPARIN SODIUM 40 MG: 100 INJECTION SUBCUTANEOUS at 20:54

## 2023-09-08 RX ADMIN — HYDROCHLOROTHIAZIDE 25 MG: 25 TABLET ORAL at 20:54

## 2023-09-08 RX ADMIN — HYDRALAZINE HYDROCHLORIDE 10 MG: 20 INJECTION, SOLUTION INTRAMUSCULAR; INTRAVENOUS at 20:55

## 2023-09-08 RX ADMIN — NITROGLYCERIN 1 INCH: 20 OINTMENT TOPICAL at 19:51

## 2023-09-08 RX ADMIN — SODIUM CHLORIDE, PRESERVATIVE FREE 10 ML: 5 INJECTION INTRAVENOUS at 20:56

## 2023-09-08 RX ADMIN — ATORVASTATIN CALCIUM 40 MG: 40 TABLET, FILM COATED ORAL at 20:54

## 2023-09-08 ASSESSMENT — ENCOUNTER SYMPTOMS
SHORTNESS OF BREATH: 0
COUGH: 0
CHEST TIGHTNESS: 0
VOMITING: 0
NAUSEA: 0

## 2023-09-08 ASSESSMENT — PAIN SCALES - GENERAL: PAINLEVEL_OUTOF10: 6

## 2023-09-08 ASSESSMENT — LIFESTYLE VARIABLES
HOW MANY STANDARD DRINKS CONTAINING ALCOHOL DO YOU HAVE ON A TYPICAL DAY: PATIENT DOES NOT DRINK
HOW OFTEN DO YOU HAVE A DRINK CONTAINING ALCOHOL: NEVER

## 2023-09-08 ASSESSMENT — PAIN - FUNCTIONAL ASSESSMENT: PAIN_FUNCTIONAL_ASSESSMENT: 0-10

## 2023-09-08 ASSESSMENT — PAIN DESCRIPTION - LOCATION: LOCATION: CHEST

## 2023-09-08 NOTE — ED TRIAGE NOTES
Pt brought to ED via EMS for chest pain that started about 2 hours ago   Pt was given 324mg ASA and sl nitro x2 per EMS which improved the pain   Pt seen in ED several days ago for the same

## 2023-09-09 ENCOUNTER — APPOINTMENT (OUTPATIENT)
Age: 88
End: 2023-09-09
Payer: MEDICARE

## 2023-09-09 LAB
ANION GAP SERPL CALC-SCNC: 9 MMOL/L (ref 3–18)
BUN SERPL-MCNC: 13 MG/DL (ref 7–18)
BUN/CREAT SERPL: 12 (ref 12–20)
CA-I BLD-MCNC: 10.3 MG/DL (ref 8.5–10.1)
CHLORIDE SERPL-SCNC: 96 MMOL/L (ref 100–111)
CO2 SERPL-SCNC: 31 MMOL/L (ref 21–32)
CREAT SERPL-MCNC: 1.05 MG/DL (ref 0.6–1.3)
EKG ATRIAL RATE: 62 BPM
EKG ATRIAL RATE: 63 BPM
EKG ATRIAL RATE: 64 BPM
EKG DIAGNOSIS: NORMAL
EKG P AXIS: 58 DEGREES
EKG P AXIS: 81 DEGREES
EKG P AXIS: 82 DEGREES
EKG P-R INTERVAL: 180 MS
EKG P-R INTERVAL: 196 MS
EKG P-R INTERVAL: 202 MS
EKG Q-T INTERVAL: 410 MS
EKG Q-T INTERVAL: 418 MS
EKG Q-T INTERVAL: 466 MS
EKG QRS DURATION: 78 MS
EKG QRS DURATION: 86 MS
EKG QRS DURATION: 92 MS
EKG QTC CALCULATION (BAZETT): 422 MS
EKG QTC CALCULATION (BAZETT): 424 MS
EKG QTC CALCULATION (BAZETT): 476 MS
EKG R AXIS: 24 DEGREES
EKG R AXIS: 28 DEGREES
EKG R AXIS: 6 DEGREES
EKG T AXIS: 10 DEGREES
EKG T AXIS: 30 DEGREES
EKG T AXIS: 39 DEGREES
EKG VENTRICULAR RATE: 62 BPM
EKG VENTRICULAR RATE: 63 BPM
EKG VENTRICULAR RATE: 64 BPM
ERYTHROCYTE [DISTWIDTH] IN BLOOD BY AUTOMATED COUNT: 13.7 % (ref 11.6–14.5)
GLUCOSE BLD STRIP.AUTO-MCNC: 119 MG/DL (ref 70–110)
GLUCOSE BLD STRIP.AUTO-MCNC: 120 MG/DL (ref 70–110)
GLUCOSE BLD STRIP.AUTO-MCNC: 139 MG/DL (ref 70–110)
GLUCOSE BLD STRIP.AUTO-MCNC: 161 MG/DL (ref 70–110)
GLUCOSE SERPL-MCNC: 118 MG/DL (ref 74–99)
HCT VFR BLD AUTO: 37.8 % (ref 35–45)
HGB BLD-MCNC: 13.4 G/DL (ref 12–16)
MCH RBC QN AUTO: 32.7 PG (ref 24–34)
MCHC RBC AUTO-ENTMCNC: 35.4 G/DL (ref 31–37)
MCV RBC AUTO: 92.2 FL (ref 78–100)
NRBC # BLD: 0 K/UL (ref 0–0.01)
NRBC BLD-RTO: 0 PER 100 WBC
PERFORMED BY:: ABNORMAL
PLATELET # BLD AUTO: 141 K/UL (ref 135–420)
PMV BLD AUTO: 11.4 FL (ref 9.2–11.8)
POTASSIUM SERPL-SCNC: 3.1 MMOL/L (ref 3.5–5.5)
RBC # BLD AUTO: 4.1 M/UL (ref 4.2–5.3)
SODIUM SERPL-SCNC: 136 MMOL/L (ref 136–145)
TROPONIN I SERPL HS-MCNC: 116 NG/L (ref 0–54)
TROPONIN I SERPL HS-MCNC: 121 NG/L (ref 0–54)
TROPONIN I SERPL HS-MCNC: 128 NG/L (ref 0–54)
WBC # BLD AUTO: 6.3 K/UL (ref 4.6–13.2)

## 2023-09-09 PROCEDURE — 96372 THER/PROPH/DIAG INJ SC/IM: CPT

## 2023-09-09 PROCEDURE — 6370000000 HC RX 637 (ALT 250 FOR IP): Performed by: NURSE PRACTITIONER

## 2023-09-09 PROCEDURE — 80048 BASIC METABOLIC PNL TOTAL CA: CPT

## 2023-09-09 PROCEDURE — 96375 TX/PRO/DX INJ NEW DRUG ADDON: CPT

## 2023-09-09 PROCEDURE — 84484 ASSAY OF TROPONIN QUANT: CPT

## 2023-09-09 PROCEDURE — 6360000002 HC RX W HCPCS: Performed by: NURSE PRACTITIONER

## 2023-09-09 PROCEDURE — 6360000004 HC RX CONTRAST MEDICATION: Performed by: INTERNAL MEDICINE

## 2023-09-09 PROCEDURE — 93005 ELECTROCARDIOGRAM TRACING: CPT | Performed by: NURSE PRACTITIONER

## 2023-09-09 PROCEDURE — 82962 GLUCOSE BLOOD TEST: CPT

## 2023-09-09 PROCEDURE — 2580000003 HC RX 258: Performed by: INTERNAL MEDICINE

## 2023-09-09 PROCEDURE — G0378 HOSPITAL OBSERVATION PER HR: HCPCS

## 2023-09-09 PROCEDURE — 71275 CT ANGIOGRAPHY CHEST: CPT

## 2023-09-09 PROCEDURE — 96376 TX/PRO/DX INJ SAME DRUG ADON: CPT

## 2023-09-09 PROCEDURE — 6370000000 HC RX 637 (ALT 250 FOR IP): Performed by: INTERNAL MEDICINE

## 2023-09-09 PROCEDURE — 85027 COMPLETE CBC AUTOMATED: CPT

## 2023-09-09 RX ORDER — HYDRALAZINE HYDROCHLORIDE 25 MG/1
25 TABLET, FILM COATED ORAL EVERY 8 HOURS SCHEDULED
Status: DISCONTINUED | OUTPATIENT
Start: 2023-09-09 | End: 2023-09-10 | Stop reason: HOSPADM

## 2023-09-09 RX ORDER — POTASSIUM CHLORIDE 750 MG/1
40 TABLET, EXTENDED RELEASE ORAL ONCE
Status: COMPLETED | OUTPATIENT
Start: 2023-09-09 | End: 2023-09-09

## 2023-09-09 RX ORDER — POTASSIUM CHLORIDE 750 MG/1
20 TABLET, EXTENDED RELEASE ORAL 2 TIMES DAILY
Status: DISCONTINUED | OUTPATIENT
Start: 2023-09-09 | End: 2023-09-10 | Stop reason: HOSPADM

## 2023-09-09 RX ORDER — SODIUM CHLORIDE 9 MG/ML
INJECTION, SOLUTION INTRAVENOUS CONTINUOUS
Status: DISCONTINUED | OUTPATIENT
Start: 2023-09-09 | End: 2023-09-10 | Stop reason: HOSPADM

## 2023-09-09 RX ADMIN — CEPHALEXIN 500 MG: 250 CAPSULE ORAL at 08:58

## 2023-09-09 RX ADMIN — ATORVASTATIN CALCIUM 40 MG: 40 TABLET, FILM COATED ORAL at 20:51

## 2023-09-09 RX ADMIN — Medication 0.4 MG: at 06:03

## 2023-09-09 RX ADMIN — MORPHINE SULFATE 1 MG: 2 INJECTION, SOLUTION INTRAMUSCULAR; INTRAVENOUS at 00:40

## 2023-09-09 RX ADMIN — IOPAMIDOL 100 ML: 755 INJECTION, SOLUTION INTRAVENOUS at 10:15

## 2023-09-09 RX ADMIN — POTASSIUM CHLORIDE 40 MEQ: 750 TABLET, EXTENDED RELEASE ORAL at 06:00

## 2023-09-09 RX ADMIN — POTASSIUM CHLORIDE 20 MEQ: 750 TABLET, EXTENDED RELEASE ORAL at 09:08

## 2023-09-09 RX ADMIN — HYDROCHLOROTHIAZIDE 25 MG: 25 TABLET ORAL at 08:57

## 2023-09-09 RX ADMIN — HYDRALAZINE HYDROCHLORIDE 25 MG: 25 TABLET, FILM COATED ORAL at 09:08

## 2023-09-09 RX ADMIN — HYDRALAZINE HYDROCHLORIDE 25 MG: 25 TABLET, FILM COATED ORAL at 20:51

## 2023-09-09 RX ADMIN — INSULIN GLARGINE 15 UNITS: 100 INJECTION, SOLUTION SUBCUTANEOUS at 20:51

## 2023-09-09 RX ADMIN — CEPHALEXIN 500 MG: 250 CAPSULE ORAL at 20:51

## 2023-09-09 RX ADMIN — VALSARTAN 80 MG: 80 TABLET, FILM COATED ORAL at 08:57

## 2023-09-09 RX ADMIN — SODIUM CHLORIDE: 9 INJECTION, SOLUTION INTRAVENOUS at 09:08

## 2023-09-09 RX ADMIN — ISOSORBIDE MONONITRATE 30 MG: 30 TABLET, EXTENDED RELEASE ORAL at 08:57

## 2023-09-09 RX ADMIN — PANTOPRAZOLE SODIUM 40 MG: 40 TABLET, DELAYED RELEASE ORAL at 06:01

## 2023-09-09 RX ADMIN — HYDRALAZINE HYDROCHLORIDE 25 MG: 25 TABLET, FILM COATED ORAL at 15:00

## 2023-09-09 RX ADMIN — ASPIRIN 81 MG 81 MG: 81 TABLET ORAL at 08:57

## 2023-09-09 RX ADMIN — Medication 0.4 MG: at 00:57

## 2023-09-09 RX ADMIN — ATENOLOL 50 MG: 25 TABLET ORAL at 08:57

## 2023-09-09 RX ADMIN — ENOXAPARIN SODIUM 40 MG: 100 INJECTION SUBCUTANEOUS at 08:58

## 2023-09-09 RX ADMIN — POTASSIUM CHLORIDE 20 MEQ: 750 TABLET, EXTENDED RELEASE ORAL at 20:51

## 2023-09-09 RX ADMIN — SODIUM CHLORIDE: 9 INJECTION, SOLUTION INTRAVENOUS at 19:48

## 2023-09-09 RX ADMIN — HYDRALAZINE HYDROCHLORIDE 10 MG: 20 INJECTION, SOLUTION INTRAMUSCULAR; INTRAVENOUS at 23:53

## 2023-09-09 ASSESSMENT — PAIN SCALES - GENERAL
PAINLEVEL_OUTOF10: 4
PAINLEVEL_OUTOF10: 9
PAINLEVEL_OUTOF10: 6
PAINLEVEL_OUTOF10: 4
PAINLEVEL_OUTOF10: 0

## 2023-09-09 ASSESSMENT — PAIN DESCRIPTION - LOCATION
LOCATION: CHEST

## 2023-09-09 ASSESSMENT — PAIN DESCRIPTION - DESCRIPTORS: DESCRIPTORS: TIGHTNESS;PRESSURE

## 2023-09-09 NOTE — PROGRESS NOTES
1945 - Rounded on pt, pt resting in bed. Assisted pt to bathroom and back to bed. Physical assessment completed with v/s. Pt denies any pain or discomfort at this time. Blood glucose checked and is 161, SSI will be held tonight. New normal saline bag replaced. CBWR.     2051 - HS medications administered per MAR. Pt denies any chest pain or discomfort at this time. CBWR.     2115 - Assisted pt to bathroom and back to bed. CBWR.     5223 - Pt seen walking out of pt room, confused, pt reoriented and assisted to bathroom and back to bed. Bed alarm set and pt made aware and reminded to call before getting OOB. Pt stated understanding. V/s obtained and pt's BP is 190/70, PRN Hydralazine administered. CBWR.     0000 - Assisted pt to bathroom and back to bed. 0030 - Pt used CB to request writer at bedside, pt is feeling nauseous, anxious, shaking, and \"not feeling well at all\". V/s obtained. PRN Zofran administered. Pt's daughter requested to speak with writer, regarding if she can come up to sit with pt, writer confirmed this was okay. CBWR.     3234 - Hospitalist notified of pt's complaints and pt wishing to speak to hospitalist. Orthostatics obtained and are negative. Blood glucose checked and is 133. Pt very anxious, assisted pt to bathroom and back to bed.     0125 - Hospitalist at bedside with writer. New orders put in for labs and 12 lead EKG. 46 - Ordered Mag replacement started as ordered. Assisted pt to bathroom and back to bed. CBWR.     4116 - Rounded on pt, pt resting in bed with eyes closed, V/s obtained. No needs from pt or pt's daughter at this time. CBWR.     7075 - Rounded on pt, pt resting in bed with pt's daughter at bedside, pt's daughter assisted pt to bathroom and back to bed. AC and scheduled medications administered. CBWR.

## 2023-09-09 NOTE — PROGRESS NOTES
2019 - Pt arrived vis ED stretcher and ED tech. Pt ambulated to bed with minimal assistance, c/o lightheaded and dizziness. Pt reminded to call before getting OOB. Pt stated understanding. Assessment complete, along with admission questions, answered by pt. V./s obtained and blood glucose checked and is 107, will recheck at 0200 per order. All pt questions and concerns addressed. Provided pt with sandwich and diet soda. Oriented pt to room and use of call bell. CBWR.     2054 - Rounded on pt and all HS and now dose of Hydralazine administered to lower BP. Pt tolerated well. CBWR.     2127 - Assisted pt to bathroom and back to bed. BP is now 127/46. Pt still c/o dizziness. CBWR.     2230 - Assisted pt to bathroom and back to bed. CBWR.    0038 - Assisted pt to bathroom and back to bed. V/s obtained. Pt c/o 9/10 chest pain, PRN Morphine offered and administered to pt.     0043 - critical Troponin called to writer of 121 from Mikaela in the lab. Belkys Olmos NP notified and no new orders received. 5703 - 12 lead EKG completed. Nitrostat administered to pt.     0350 - Assisted pt to bathroom and back to bed. V/s obtained. No needs expressed at this time. CBWR.     6093 Genesis Morgan in the lab called a critical Troponin of 128, writer notified hospitalist.     8446 - Rounded on pt, administered ordered potassium supplement per [facility] with AC medication. Pt tolerated well. Pt c/o slight chest pain and requested Nitro, Nitro administered. CBWR.

## 2023-09-09 NOTE — ASSESSMENT & PLAN NOTE
- Differential includes GERD/gastritis flare-up, and ACS   - Troponins 63-71 . Trend serial cardiac enzymes. - EKG normal sinus rhythm without evidence of ischemia. - Chest x-ray showed no acute cardiopulmonary infiltrates.     - Cardiac monitoring  - CBC and BMP

## 2023-09-09 NOTE — PROGRESS NOTES
1400 - assumed care of patient. 1900 - shift report given to the oncoming night nurse Kathie Martini LPN.

## 2023-09-09 NOTE — PLAN OF CARE
Comprehensive Nutrition Assessment    Type and Reason for Visit:  Initial, Positive Nutrition Screen    Nutrition Recommendations/Plan:   Continue current diet order  Rec high dose probiotic QD for chronic diarrhea  Rec outpatient RDN to discuss chronic diarrhea in the setting of crohns disease. May benefit from food elimination diet and nutrition counseling. Malnutrition Assessment:  Malnutrition Status:  No malnutrition (09/09/23 1411)    Context:  Chronic Illness     Findings of the 6 clinical characteristics of malnutrition:  Energy Intake:  No significant decrease in energy intake  Weight Loss:  No significant weight loss     Body Fat Loss:  No significant body fat loss     Muscle Mass Loss:  No significant muscle mass loss    Fluid Accumulation:  Unable to assess     Strength:  Not Performed    Nutrition Assessment:    Ms Kristie Haines is a 81 yo woman with pmhx sig for cardiac hx, DM, crohns, UTI and admits for chest pain. Patient appetite and intake is adequate to meet needs, consuming ~% of meals provided. She c/o consistent diarrhea for years, which may be related to her hx of crohns dx. She reports no recent sig weight changes, and no vomiting, or nausea. Discussed a daily probiotic to assist with diarrhea management, and reviewed foods that bother her stomach the most.  This included fatty meats. Excluded them from her diet while in the hospital.  Current diet order is a diabetic cardiac diet, which is appropriate. Nutrition Related Findings:    Medications and nutrition related labs reviewed Wound Type: None       Current Nutrition Intake & Therapies:    Average Meal Intake: 51-75%, %  Average Supplements Intake: None Ordered  ADULT DIET; Regular; 3 carb choices (45 gm/meal); Low Fat/Low Chol/High Fiber/2 gm Na;  No Caffeine; NO Red meats, NO Ham, NO Serna    Anthropometric Measures:  Height: 5' 1\" (154.9 cm)  Ideal Body Weight (IBW): 105 lbs (48 kg)    Admission Body Weight: 151 lb

## 2023-09-09 NOTE — PROGRESS NOTES
0700 - Assumed care of patient. Report received from MILLA Pbalo, [address] - Patient assisted to bathroom, stand by assist x1. Pt assessment completed. Patient ambulated to bathroom with standby assist. Weakness noted and she c/o dizziness when standing. She is alert and oriented x4. Denied pain. 4376 - AM meds given    0950 - Assisted to bathroom with stand by assist.    1005 - Patient taken to CT via wheelchair by Rad staff. 1023 - Patient returned to room. IVF restarted. 1330 - PIV infiltrated, removed by ASHU Austin, RN nursing supervisor. 1400 - Report given to Hardik Whitt RN.

## 2023-09-09 NOTE — ASSESSMENT & PLAN NOTE
-chronic, controlled  -continue home dose of Lantus, implement sliding scale, and hypoglycemic protocol  -diabetic diet

## 2023-09-09 NOTE — H&P
History and Physical    Subjective:     Ricard Cushing is a 82 y.o. elderly  female with a past medical history for hypertension, CVA, hypercholesteremia, diabetes mellitus type 2, recurrent UTIs, and congestive heart failure, patient presented to the ED with a chief complaint of chest pain. Patient reports that upon awakening this morning she started having left and right lateral wall chest pain more to the left side, patient states the pain was intermittent and sharp, taking a deep breath intensified the pain, and she rated pain 7/10. Other accompanying symptoms included nausea, headache, and dizziness, denies palpitations, shortness of breath, abdominal pain, vomiting, diarrhea, constipation. In the ED patient was found to have a troponin of 68, and repeat of 71, EKG shown SR without any ST changes, no acute findings per CXR. While in the ED patient had NTG paste placed to the left chest wall. Discussed case with ED provider, hospital medicine will admit the patient for further evaluation and treatment. Patient assessed at the bedside, patient is alert and oriented, there is no acute distress noted. Patient agrees to admission for a diagnosis of chest pain, treatment to include cardiac monitoring and trending troponin. Admit to medical telemetry unit for observation.      Past Medical History:   Diagnosis Date    Cataract     CHF (congestive heart failure) ([address])     Claustrophobia     Crohn's disease ([address])     CVA (cerebral vascular accident) ([address])     DM (diabetes mellitus) ([address])     High cholesterol     HTN (hypertension)     Hypertensive emergency     Hypokalemia     Intractable headache     Ruptured ear drum     Syncope     Unsteady gait     UTI (urinary tract infection)       Past Surgical History:   Procedure Laterality Date    CHOLECYSTECTOMY      HYSTERECTOMY (CERVIX STATUS UNKNOWN)      THORACOTOMY       Family History   Problem Relation Age of Onset    No Known Problems Mother     No

## 2023-09-09 NOTE — ASSESSMENT & PLAN NOTE
-chronic above goal  -continue Toprol, Valsartan, Isosorbide, and PRN Hydralazine ordered for systolic greater than 303  -monitor BP closely

## 2023-09-09 NOTE — ED NOTES
TRANSFER - OUT REPORT:    Verbal report given to [address] on Tommas Breed  being transferred to  for change in patient condition ( )       Report consisted of patient's Situation, Background, Assessment and   Recommendations(SBAR). Information from the following report(s) Nurse Handoff Report, ED Encounter Summary, MAR, and Recent Results was reviewed with the receiving nurse. [facility] Fall Assessment:    Presents to emergency department  because of falls (Syncope, seizure, or loss of consciousness): No  Age > 70: Yes  Altered Mental Status, Intoxication with alcohol or substance confusion (Disorientation, impaired judgment, poor safety awaremess, or inability to follow instructions): No  Impaired Mobility: Ambulates or transfers with assistive devices or assistance; Unable to ambulate or transer.: No  Nursing Judgement: No          Lines:       Opportunity for questions and clarification was provided.       Patient transported with:  Monitor and Tech           Ugo Mcintosh RN  09/08/23 2009

## 2023-09-10 VITALS
HEIGHT: 61 IN | DIASTOLIC BLOOD PRESSURE: 53 MMHG | OXYGEN SATURATION: 98 % | BODY MASS INDEX: 28.75 KG/M2 | RESPIRATION RATE: 16 BRPM | SYSTOLIC BLOOD PRESSURE: 128 MMHG | HEART RATE: 60 BPM | WEIGHT: 152.3 LBS | TEMPERATURE: 98.3 F

## 2023-09-10 LAB
ALBUMIN SERPL-MCNC: 3.4 G/DL (ref 3.4–5)
ALBUMIN/GLOB SERPL: 1 (ref 0.8–1.7)
ALP SERPL-CCNC: 47 U/L (ref 45–117)
ALT SERPL-CCNC: 20 U/L (ref 13–56)
ANION GAP SERPL CALC-SCNC: 8 MMOL/L (ref 3–18)
AST SERPL W P-5'-P-CCNC: 18 U/L (ref 10–38)
BASOPHILS # BLD: 0.1 K/UL (ref 0–0.1)
BASOPHILS NFR BLD: 1 % (ref 0–2)
BILIRUB SERPL-MCNC: 0.7 MG/DL (ref 0.2–1)
BUN SERPL-MCNC: 14 MG/DL (ref 7–18)
BUN/CREAT SERPL: 14 (ref 12–20)
CA-I BLD-MCNC: 9.5 MG/DL (ref 8.5–10.1)
CHLORIDE SERPL-SCNC: 100 MMOL/L (ref 100–111)
CO2 SERPL-SCNC: 26 MMOL/L (ref 21–32)
CREAT SERPL-MCNC: 0.98 MG/DL (ref 0.6–1.3)
D DIMER PPP FEU-MCNC: 0.33 UG/ML(FEU)
DIFFERENTIAL METHOD BLD: ABNORMAL
EKG ATRIAL RATE: 64 BPM
EKG DIAGNOSIS: NORMAL
EKG P AXIS: 72 DEGREES
EKG P-R INTERVAL: 180 MS
EKG Q-T INTERVAL: 418 MS
EKG QRS DURATION: 74 MS
EKG QTC CALCULATION (BAZETT): 431 MS
EKG R AXIS: 15 DEGREES
EKG T AXIS: 32 DEGREES
EKG VENTRICULAR RATE: 64 BPM
EOSINOPHIL # BLD: 0.1 K/UL (ref 0–0.4)
EOSINOPHIL NFR BLD: 2 % (ref 0–5)
ERYTHROCYTE [DISTWIDTH] IN BLOOD BY AUTOMATED COUNT: 13.6 % (ref 11.6–14.5)
GLOBULIN SER CALC-MCNC: 3.3 G/DL (ref 2–4)
GLUCOSE BLD STRIP.AUTO-MCNC: 111 MG/DL (ref 70–110)
GLUCOSE BLD STRIP.AUTO-MCNC: 131 MG/DL (ref 70–110)
GLUCOSE BLD STRIP.AUTO-MCNC: 133 MG/DL (ref 70–110)
GLUCOSE SERPL-MCNC: 140 MG/DL (ref 74–99)
HCT VFR BLD AUTO: 38.1 % (ref 35–45)
HGB BLD-MCNC: 13.4 G/DL (ref 12–16)
IMM GRANULOCYTES # BLD AUTO: 0 K/UL (ref 0–0.04)
IMM GRANULOCYTES NFR BLD AUTO: 1 % (ref 0–0.5)
LYMPHOCYTES # BLD: 1.8 K/UL (ref 0.9–3.6)
LYMPHOCYTES NFR BLD: 27 % (ref 21–52)
MAGNESIUM SERPL-MCNC: 1.2 MG/DL (ref 1.6–2.6)
MAGNESIUM SERPL-MCNC: 1.8 MG/DL (ref 1.6–2.6)
MCH RBC QN AUTO: 32.4 PG (ref 24–34)
MCHC RBC AUTO-ENTMCNC: 35.2 G/DL (ref 31–37)
MCV RBC AUTO: 92.3 FL (ref 78–100)
MONOCYTES # BLD: 0.7 K/UL (ref 0.05–1.2)
MONOCYTES NFR BLD: 11 % (ref 3–10)
NEUTS SEG # BLD: 3.9 K/UL (ref 1.8–8)
NEUTS SEG NFR BLD: 58 % (ref 40–73)
NRBC # BLD: 0 K/UL (ref 0–0.01)
NRBC BLD-RTO: 0 PER 100 WBC
PERFORMED BY:: ABNORMAL
PLATELET # BLD AUTO: 152 K/UL (ref 135–420)
PMV BLD AUTO: 11 FL (ref 9.2–11.8)
POTASSIUM SERPL-SCNC: 3.7 MMOL/L (ref 3.5–5.5)
PROT SERPL-MCNC: 6.7 G/DL (ref 6.4–8.2)
RBC # BLD AUTO: 4.13 M/UL (ref 4.2–5.3)
SODIUM SERPL-SCNC: 134 MMOL/L (ref 136–145)
TROPONIN I SERPL HS-MCNC: 115 NG/L (ref 0–54)
TSH SERPL DL<=0.05 MIU/L-ACNC: 4.16 UIU/ML (ref 0.36–3.74)
WBC # BLD AUTO: 6.7 K/UL (ref 4.6–13.2)

## 2023-09-10 PROCEDURE — 96372 THER/PROPH/DIAG INJ SC/IM: CPT

## 2023-09-10 PROCEDURE — 6370000000 HC RX 637 (ALT 250 FOR IP): Performed by: NURSE PRACTITIONER

## 2023-09-10 PROCEDURE — 36415 COLL VENOUS BLD VENIPUNCTURE: CPT

## 2023-09-10 PROCEDURE — 80053 COMPREHEN METABOLIC PANEL: CPT

## 2023-09-10 PROCEDURE — 6360000002 HC RX W HCPCS: Performed by: NURSE PRACTITIONER

## 2023-09-10 PROCEDURE — 96365 THER/PROPH/DIAG IV INF INIT: CPT

## 2023-09-10 PROCEDURE — 96375 TX/PRO/DX INJ NEW DRUG ADDON: CPT

## 2023-09-10 PROCEDURE — G0378 HOSPITAL OBSERVATION PER HR: HCPCS

## 2023-09-10 PROCEDURE — 85025 COMPLETE CBC W/AUTO DIFF WBC: CPT

## 2023-09-10 PROCEDURE — 96366 THER/PROPH/DIAG IV INF ADDON: CPT

## 2023-09-10 PROCEDURE — 85379 FIBRIN DEGRADATION QUANT: CPT

## 2023-09-10 PROCEDURE — 2580000003 HC RX 258: Performed by: INTERNAL MEDICINE

## 2023-09-10 PROCEDURE — 93005 ELECTROCARDIOGRAM TRACING: CPT | Performed by: NURSE PRACTITIONER

## 2023-09-10 PROCEDURE — 2500000003 HC RX 250 WO HCPCS: Performed by: NURSE PRACTITIONER

## 2023-09-10 PROCEDURE — 84484 ASSAY OF TROPONIN QUANT: CPT

## 2023-09-10 PROCEDURE — 82962 GLUCOSE BLOOD TEST: CPT

## 2023-09-10 PROCEDURE — 84443 ASSAY THYROID STIM HORMONE: CPT

## 2023-09-10 PROCEDURE — 6370000000 HC RX 637 (ALT 250 FOR IP): Performed by: INTERNAL MEDICINE

## 2023-09-10 PROCEDURE — 83735 ASSAY OF MAGNESIUM: CPT

## 2023-09-10 RX ORDER — MAGNESIUM SULFATE HEPTAHYDRATE 40 MG/ML
2000 INJECTION, SOLUTION INTRAVENOUS ONCE
Status: COMPLETED | OUTPATIENT
Start: 2023-09-10 | End: 2023-09-10

## 2023-09-10 RX ADMIN — VALSARTAN 80 MG: 80 TABLET, FILM COATED ORAL at 08:35

## 2023-09-10 RX ADMIN — ONDANSETRON 4 MG: 2 INJECTION INTRAMUSCULAR; INTRAVENOUS at 00:40

## 2023-09-10 RX ADMIN — POTASSIUM CHLORIDE 20 MEQ: 750 TABLET, EXTENDED RELEASE ORAL at 08:35

## 2023-09-10 RX ADMIN — ASPIRIN 81 MG 81 MG: 81 TABLET ORAL at 08:36

## 2023-09-10 RX ADMIN — ISOSORBIDE MONONITRATE 30 MG: 30 TABLET, EXTENDED RELEASE ORAL at 08:35

## 2023-09-10 RX ADMIN — ATENOLOL 50 MG: 25 TABLET ORAL at 08:35

## 2023-09-10 RX ADMIN — CEPHALEXIN 500 MG: 250 CAPSULE ORAL at 08:35

## 2023-09-10 RX ADMIN — HYDRALAZINE HYDROCHLORIDE 25 MG: 25 TABLET, FILM COATED ORAL at 06:48

## 2023-09-10 RX ADMIN — MAGNESIUM SULFATE HEPTAHYDRATE 2000 MG: 40 INJECTION, SOLUTION INTRAVENOUS at 02:35

## 2023-09-10 RX ADMIN — SODIUM CHLORIDE: 9 INJECTION, SOLUTION INTRAVENOUS at 05:42

## 2023-09-10 RX ADMIN — PANTOPRAZOLE SODIUM 40 MG: 40 TABLET, DELAYED RELEASE ORAL at 06:48

## 2023-09-10 RX ADMIN — HYDROCHLOROTHIAZIDE 25 MG: 25 TABLET ORAL at 08:36

## 2023-09-10 RX ADMIN — ENOXAPARIN SODIUM 40 MG: 100 INJECTION SUBCUTANEOUS at 08:35

## 2023-09-10 ASSESSMENT — PAIN SCALES - GENERAL: PAINLEVEL_OUTOF10: 0

## 2023-09-10 NOTE — PROGRESS NOTES
0700 - assumed care of patient. 1215 - discharge instructions given, IV removed, discontinued telemetry, patient taken downstairs via wheelchair, patient discharged home.

## 2023-09-10 NOTE — PROGRESS NOTES
I was called to the bedside of the patient for her experiencing nausea shakiness mild headache, anxiousness, and an overall feeling of not feeling well. I examined the patient and she is awake alert oriented no chest pain or shortness of breath reported. Patient is not experiencing any symptoms of stroke she is neurologically intact. Patient has recently started during this hospitalization hydralazine 3 times a day and as needed hydralazine for hypertension patient has not been on this medication prior to admission. I suspect patient is having adverse reactions related to a change in her blood pressure. Patient's orthostatics were negative all except for her blood pressure did lower significantly when standing heart rate stayed the same. I will order a set of labs stat and EKG stat and then reevaluate after I see the labs. 0225 I reviewed the stat labs all are normal except for magnesium is 1.2, 2 g of magnesium is ordered. At this point in time I went in and talked with the patient and her daughter I suspect that the low magnesium may be causing the patient's jitteriness. She may also be experiencing side effects of the hydralazine that she is started since hospitalization 3 times a day. I advised patient to rest we will replace her magnesium and see how she reacts to the continued hydralazine. I will continue to monitor the patient.       Tank Mejia DNP, ENP-C, FNP-C

## 2023-09-10 NOTE — DISCHARGE SUMMARY
Discharge Summary       Patient ID:  Mei Vásquez,   82 y.o., female  1935    PCP:  Sherman Tavarez MD    Admit Date: 9/8/2023  4:30 PM  Discharge Date:  No discharge date for patient encounter. Length of stay: 0 day(s)  Code Status: Full Code  Discharging physician: Derick Bejarano MD    Admission Diagnoses:   Chest pain [R07.9]  Chest pain, unspecified type [R07.9]    Discharge Medications     Medication List        CONTINUE taking these medications      aspirin 81 MG EC tablet     atenolol 50 MG tablet  Commonly known as: TENORMIN     atorvastatin 40 MG tablet  Commonly known as: LIPITOR     cephALEXin 500 MG capsule  Commonly known as: KEFLEX  Take 1 capsule by mouth 2 times daily for 7 days     hydroCHLOROthiazide 25 MG tablet  Commonly known as: HYDRODIURIL     isosorbide mononitrate 30 MG extended release tablet  Commonly known as: IMDUR     Lantus SoloStar 100 UNIT/ML injection pen  Generic drug: insulin glargine     omeprazole 20 MG delayed release capsule  Commonly known as: PRILOSEC     potassium chloride 10 MEQ extended release tablet  Commonly known as: KLOR-CON M  Take 1 tablet by mouth 2 times daily for 4 days     sertraline 25 MG tablet  Commonly known as: ZOLOFT     valsartan 80 MG tablet  Commonly known as: DIOVAN              HPI on Admission (per admitting physician):   Mei Vásquez is a 82 y.o. elderly  female with a past medical history for hypertension, CVA, hypercholesteremia, diabetes mellitus type 2, recurrent UTIs, and congestive heart failure, patient presented to the ED with a chief complaint of chest pain. Patient reports that upon awakening this morning she started having left and right lateral wall chest pain more to the left side, patient states the pain was intermittent and sharp, taking a deep breath intensified the pain, and she rated pain 7/10.   Other accompanying symptoms included nausea, headache, and dizziness, denies palpitations, shortness of breath,

## 2023-09-10 NOTE — PLAN OF CARE
Problem: Safety - Adult  Goal: Free from fall injury  Outcome: Progressing     Problem: ABCDS Injury Assessment  Goal: Absence of physical injury  Outcome: Progressing     Problem: Discharge Planning  Goal: Discharge to home or other facility with appropriate resources  Outcome: Progressing     Problem: Pain  Goal: Verbalizes/displays adequate comfort level or baseline comfort level  Outcome: Progressing     Problem: Chronic Conditions and Co-morbidities  Goal: Patient's chronic conditions and co-morbidity symptoms are monitored and maintained or improved  Outcome: Progressing     Problem: Nutrition Deficit:  Goal: Optimize nutritional status  Outcome: Progressing

## 2023-09-19 ENCOUNTER — APPOINTMENT (OUTPATIENT)
Age: 88
DRG: 305 | End: 2023-09-19
Payer: MEDICARE

## 2023-09-19 ENCOUNTER — HOSPITAL ENCOUNTER (INPATIENT)
Age: 88
LOS: 1 days | Discharge: HOME | DRG: 305 | End: 2023-09-20
Attending: FAMILY MEDICINE | Admitting: INTERNAL MEDICINE
Payer: MEDICARE

## 2023-09-19 DIAGNOSIS — E87.6 HYPOKALEMIA: ICD-10-CM

## 2023-09-19 DIAGNOSIS — N30.00 ACUTE CYSTITIS WITHOUT HEMATURIA: ICD-10-CM

## 2023-09-19 DIAGNOSIS — R07.1 CHEST PAIN ON BREATHING: ICD-10-CM

## 2023-09-19 DIAGNOSIS — E86.0 DEHYDRATION: ICD-10-CM

## 2023-09-19 DIAGNOSIS — R07.9 CHEST PAIN, UNSPECIFIED TYPE: Primary | ICD-10-CM

## 2023-09-19 DIAGNOSIS — E83.42 HYPOMAGNESEMIA: ICD-10-CM

## 2023-09-19 DIAGNOSIS — R19.7 DIARRHEA, UNSPECIFIED TYPE: ICD-10-CM

## 2023-09-19 DIAGNOSIS — I10 HYPERTENSION, UNSPECIFIED TYPE: ICD-10-CM

## 2023-09-19 DIAGNOSIS — I16.0 HYPERTENSIVE URGENCY: ICD-10-CM

## 2023-09-19 LAB
ALBUMIN SERPL-MCNC: 3.6 G/DL (ref 3.4–5)
ALBUMIN/GLOB SERPL: 1 (ref 0.8–1.7)
ALP SERPL-CCNC: 51 U/L (ref 45–117)
ALT SERPL-CCNC: 23 U/L (ref 13–56)
ANION GAP SERPL CALC-SCNC: 10 MMOL/L (ref 3–18)
APPEARANCE UR: CLEAR
AST SERPL W P-5'-P-CCNC: 29 U/L (ref 10–38)
BACTERIA URNS QL MICRO: ABNORMAL /HPF
BASOPHILS # BLD: 0.1 K/UL (ref 0–0.1)
BASOPHILS NFR BLD: 1 % (ref 0–2)
BILIRUB SERPL-MCNC: 0.6 MG/DL (ref 0.2–1)
BILIRUB UR QL: NEGATIVE
BNP SERPL-MCNC: 75 PG/ML (ref 0–1800)
BUN SERPL-MCNC: 16 MG/DL (ref 7–18)
BUN/CREAT SERPL: 13 (ref 12–20)
CA-I BLD-MCNC: 10.2 MG/DL (ref 8.5–10.1)
CHLORIDE SERPL-SCNC: 95 MMOL/L (ref 100–111)
CO2 SERPL-SCNC: 27 MMOL/L (ref 21–32)
COLOR UR: YELLOW
CREAT SERPL-MCNC: 1.24 MG/DL (ref 0.6–1.3)
DIFFERENTIAL METHOD BLD: ABNORMAL
EOSINOPHIL # BLD: 0.1 K/UL (ref 0–0.4)
EOSINOPHIL NFR BLD: 1 % (ref 0–5)
EPITH CASTS URNS QL MICRO: ABNORMAL /LPF (ref 0–20)
ERYTHROCYTE [DISTWIDTH] IN BLOOD BY AUTOMATED COUNT: 13.1 % (ref 11.6–14.5)
GLOBULIN SER CALC-MCNC: 3.6 G/DL (ref 2–4)
GLUCOSE BLD STRIP.AUTO-MCNC: 106 MG/DL (ref 70–110)
GLUCOSE SERPL-MCNC: 109 MG/DL (ref 74–99)
GLUCOSE UR STRIP.AUTO-MCNC: NEGATIVE MG/DL
HCT VFR BLD AUTO: 39.1 % (ref 35–45)
HGB BLD-MCNC: 14 G/DL (ref 12–16)
HGB UR QL STRIP: NEGATIVE
IMM GRANULOCYTES # BLD AUTO: 0 K/UL (ref 0–0.04)
IMM GRANULOCYTES NFR BLD AUTO: 1 % (ref 0–0.5)
KETONES UR QL STRIP.AUTO: NEGATIVE MG/DL
LEUKOCYTE ESTERASE UR QL STRIP.AUTO: ABNORMAL
LIPASE SERPL-CCNC: 65 U/L (ref 73–393)
LYMPHOCYTES # BLD: 1.8 K/UL (ref 0.9–3.6)
LYMPHOCYTES NFR BLD: 31 % (ref 21–52)
MAGNESIUM SERPL-MCNC: 1.4 MG/DL (ref 1.6–2.6)
MCH RBC QN AUTO: 33 PG (ref 24–34)
MCHC RBC AUTO-ENTMCNC: 35.8 G/DL (ref 31–37)
MCV RBC AUTO: 92.2 FL (ref 78–100)
MONOCYTES # BLD: 0.7 K/UL (ref 0.05–1.2)
MONOCYTES NFR BLD: 12 % (ref 3–10)
NEUTS SEG # BLD: 3.1 K/UL (ref 1.8–8)
NEUTS SEG NFR BLD: 54 % (ref 40–73)
NITRITE UR QL STRIP.AUTO: POSITIVE
NRBC # BLD: 0 K/UL (ref 0–0.01)
NRBC BLD-RTO: 0 PER 100 WBC
PERFORMED BY:: NORMAL
PH UR STRIP: 7.5 (ref 5–8)
PLATELET # BLD AUTO: 154 K/UL (ref 135–420)
PMV BLD AUTO: 11.5 FL (ref 9.2–11.8)
POTASSIUM SERPL-SCNC: 3.2 MMOL/L (ref 3.5–5.5)
PROT SERPL-MCNC: 7.2 G/DL (ref 6.4–8.2)
PROT UR STRIP-MCNC: ABNORMAL MG/DL
RBC # BLD AUTO: 4.24 M/UL (ref 4.2–5.3)
RBC #/AREA URNS HPF: ABNORMAL /HPF (ref 0–2)
SODIUM SERPL-SCNC: 132 MMOL/L (ref 136–145)
SP GR UR REFRACTOMETRY: 1.01 (ref 1–1.03)
TROPONIN I SERPL HS-MCNC: 22 NG/L (ref 0–54)
TROPONIN I SERPL HS-MCNC: 57 NG/L (ref 0–54)
TSH SERPL DL<=0.05 MIU/L-ACNC: 2.62 UIU/ML (ref 0.36–3.74)
UROBILINOGEN UR QL STRIP.AUTO: 0.2 EU/DL (ref 0.2–1)
WBC # BLD AUTO: 5.7 K/UL (ref 4.6–13.2)
WBC URNS QL MICRO: ABNORMAL /HPF (ref 0–4)

## 2023-09-19 PROCEDURE — 99285 EMERGENCY DEPT VISIT HI MDM: CPT

## 2023-09-19 PROCEDURE — 83690 ASSAY OF LIPASE: CPT

## 2023-09-19 PROCEDURE — 87186 SC STD MICRODIL/AGAR DIL: CPT

## 2023-09-19 PROCEDURE — 85025 COMPLETE CBC W/AUTO DIFF WBC: CPT

## 2023-09-19 PROCEDURE — 96365 THER/PROPH/DIAG IV INF INIT: CPT

## 2023-09-19 PROCEDURE — 6360000002 HC RX W HCPCS: Performed by: FAMILY MEDICINE

## 2023-09-19 PROCEDURE — 80053 COMPREHEN METABOLIC PANEL: CPT

## 2023-09-19 PROCEDURE — 96375 TX/PRO/DX INJ NEW DRUG ADDON: CPT

## 2023-09-19 PROCEDURE — 82962 GLUCOSE BLOOD TEST: CPT

## 2023-09-19 PROCEDURE — 6370000000 HC RX 637 (ALT 250 FOR IP): Performed by: FAMILY MEDICINE

## 2023-09-19 PROCEDURE — 83880 ASSAY OF NATRIURETIC PEPTIDE: CPT

## 2023-09-19 PROCEDURE — 84484 ASSAY OF TROPONIN QUANT: CPT

## 2023-09-19 PROCEDURE — 87086 URINE CULTURE/COLONY COUNT: CPT

## 2023-09-19 PROCEDURE — 71045 X-RAY EXAM CHEST 1 VIEW: CPT

## 2023-09-19 PROCEDURE — 96361 HYDRATE IV INFUSION ADD-ON: CPT

## 2023-09-19 PROCEDURE — 2500000003 HC RX 250 WO HCPCS: Performed by: FAMILY MEDICINE

## 2023-09-19 PROCEDURE — 81001 URINALYSIS AUTO W/SCOPE: CPT

## 2023-09-19 PROCEDURE — 36415 COLL VENOUS BLD VENIPUNCTURE: CPT

## 2023-09-19 PROCEDURE — 2580000003 HC RX 258: Performed by: FAMILY MEDICINE

## 2023-09-19 PROCEDURE — 1100000000 HC RM PRIVATE

## 2023-09-19 PROCEDURE — 87077 CULTURE AEROBIC IDENTIFY: CPT

## 2023-09-19 PROCEDURE — A4216 STERILE WATER/SALINE, 10 ML: HCPCS | Performed by: FAMILY MEDICINE

## 2023-09-19 PROCEDURE — 83735 ASSAY OF MAGNESIUM: CPT

## 2023-09-19 PROCEDURE — 84443 ASSAY THYROID STIM HORMONE: CPT

## 2023-09-19 PROCEDURE — 93005 ELECTROCARDIOGRAM TRACING: CPT | Performed by: FAMILY MEDICINE

## 2023-09-19 RX ORDER — 0.9 % SODIUM CHLORIDE 0.9 %
1000 INTRAVENOUS SOLUTION INTRAVENOUS ONCE
Status: COMPLETED | OUTPATIENT
Start: 2023-09-19 | End: 2023-09-19

## 2023-09-19 RX ORDER — POTASSIUM CHLORIDE 7.45 MG/ML
10 INJECTION INTRAVENOUS
Status: COMPLETED | OUTPATIENT
Start: 2023-09-19 | End: 2023-09-19

## 2023-09-19 RX ORDER — MAGNESIUM SULFATE 1 G/100ML
1000 INJECTION INTRAVENOUS
Status: COMPLETED | OUTPATIENT
Start: 2023-09-19 | End: 2023-09-19

## 2023-09-19 RX ORDER — 0.9 % SODIUM CHLORIDE 0.9 %
250 INTRAVENOUS SOLUTION INTRAVENOUS ONCE
Status: COMPLETED | OUTPATIENT
Start: 2023-09-19 | End: 2023-09-19

## 2023-09-19 RX ORDER — NITROGLYCERIN 0.4 MG/1
0.4 TABLET SUBLINGUAL EVERY 5 MIN PRN
Status: DISCONTINUED | OUTPATIENT
Start: 2023-09-19 | End: 2023-09-20 | Stop reason: HOSPADM

## 2023-09-19 RX ADMIN — NITROGLYCERIN 0.4 MG: 0.4 TABLET, ORALLY DISINTEGRATING SUBLINGUAL at 20:10

## 2023-09-19 RX ADMIN — SODIUM CHLORIDE 1000 ML: 9 INJECTION, SOLUTION INTRAVENOUS at 20:04

## 2023-09-19 RX ADMIN — POTASSIUM CHLORIDE 10 MEQ: 7.46 INJECTION, SOLUTION INTRAVENOUS at 22:21

## 2023-09-19 RX ADMIN — SODIUM CHLORIDE 250 ML: 9 INJECTION, SOLUTION INTRAVENOUS at 22:21

## 2023-09-19 RX ADMIN — MAGNESIUM SULFATE HEPTAHYDRATE 1000 MG: 1 INJECTION, SOLUTION INTRAVENOUS at 21:07

## 2023-09-19 RX ADMIN — FAMOTIDINE 20 MG: 10 INJECTION, SOLUTION INTRAVENOUS at 20:11

## 2023-09-19 RX ADMIN — NITROGLYCERIN 0.4 MG: 0.4 TABLET, ORALLY DISINTEGRATING SUBLINGUAL at 20:15

## 2023-09-19 RX ADMIN — WATER 1000 MG: 1 INJECTION INTRAMUSCULAR; INTRAVENOUS; SUBCUTANEOUS at 21:03

## 2023-09-19 ASSESSMENT — PAIN SCALES - GENERAL
PAINLEVEL_OUTOF10: 6
PAINLEVEL_OUTOF10: 8
PAINLEVEL_OUTOF10: 7
PAINLEVEL_OUTOF10: 8

## 2023-09-19 ASSESSMENT — PAIN - FUNCTIONAL ASSESSMENT: PAIN_FUNCTIONAL_ASSESSMENT: 0-10

## 2023-09-19 NOTE — ED PROVIDER NOTES
Granulocytes 1 (H) 0 - 0.5 %    Neutrophils Absolute 3.1 1.8 - 8.0 K/UL    Lymphocytes Absolute 1.8 0.9 - 3.6 K/UL    Monocytes Absolute 0.7 0.05 - 1.2 K/UL    Eosinophils Absolute 0.1 0.0 - 0.4 K/UL    Basophils Absolute 0.1 0.0 - 0.1 K/UL    Absolute Immature Granulocyte 0.0 0.00 - 0.04 K/UL    Differential Type AUTOMATED     CMP    Collection Time: 09/19/23  8:00 PM   Result Value Ref Range    Sodium 132 (L) 136 - 145 mmol/L    Potassium 3.2 (L) 3.5 - 5.5 mmol/L    Chloride 95 (L) 100 - 111 mmol/L    CO2 27 21 - 32 mmol/L    Anion Gap 10 3.0 - 18.0 mmol/L    Glucose 109 (H) 74 - 99 mg/dL    BUN 16 7 - 18 mg/dL    Creatinine 1.24 0.60 - 1.30 mg/dL    Bun/Cre Ratio 13 12 - 20      Est, Glom Filt Rate 42 (L) >60 ml/min/1.73m2    Calcium 10.2 (H) 8.5 - 10.1 mg/dL    Total Bilirubin 0.6 0.2 - 1.0 mg/dL    AST 29 10 - 38 U/L    ALT 23 13 - 56 U/L    Alk Phosphatase 51 45 - 117 U/L    Total Protein 7.2 6.4 - 8.2 g/dL    Albumin 3.6 3.4 - 5.0 g/dL    Globulin 3.6 2.0 - 4.0 g/dL    Albumin/Globulin Ratio 1.0 0.8 - 1.7     Lipase    Collection Time: 09/19/23  8:00 PM   Result Value Ref Range    Lipase 65 (L) 73 - 393 U/L   Magnesium    Collection Time: 09/19/23  8:00 PM   Result Value Ref Range    Magnesium 1.4 (L) 1.6 - 2.6 mg/dL   TSH    Collection Time: 09/19/23  8:00 PM   Result Value Ref Range    TSH, 3RD GENERATION 2.62 0.36 - 3.74 uIU/mL   Troponin    Collection Time: 09/19/23  8:00 PM   Result Value Ref Range    Troponin, High Sensitivity 57 (H) 0 - 54 ng/L   Brain Natriuretic Peptide    Collection Time: 09/19/23  8:00 PM   Result Value Ref Range    NT Pro-BNP 75 0 - 1,800 pg/mL   Urinalysis    Collection Time: 09/19/23  8:06 PM   Result Value Ref Range    Color, UA Yellow      Appearance Clear      Specific Gravity, UA 1.008 1.005 - 1.030      pH, Urine 7.5 5.0 - 8.0      Protein, UA Trace (A) Negative mg/dL    Glucose, UA Negative Negative mg/dL    Ketones, Urine Negative Negative mg/dL    Bilirubin Urine

## 2023-09-19 NOTE — ED TRIAGE NOTES
Pt arrives via chest pain for recurring chest pain that comes and goes x 4 d, dizziness when ambulating x 2 d, diarrhea x 3 days. Hypertensive on arrival, blood sugar in field 110.

## 2023-09-20 ENCOUNTER — APPOINTMENT (OUTPATIENT)
Age: 88
DRG: 305 | End: 2023-09-20
Payer: MEDICARE

## 2023-09-20 ENCOUNTER — TELEPHONE (OUTPATIENT)
Age: 88
End: 2023-09-20

## 2023-09-20 VITALS
SYSTOLIC BLOOD PRESSURE: 100 MMHG | HEIGHT: 61 IN | HEART RATE: 55 BPM | DIASTOLIC BLOOD PRESSURE: 46 MMHG | WEIGHT: 152 LBS | OXYGEN SATURATION: 94 % | RESPIRATION RATE: 16 BRPM | BODY MASS INDEX: 28.7 KG/M2 | TEMPERATURE: 97.6 F

## 2023-09-20 PROBLEM — R19.7 DIARRHEA: Status: ACTIVE | Noted: 2023-09-20

## 2023-09-20 PROBLEM — E87.8 ELECTROLYTE IMBALANCE: Status: ACTIVE | Noted: 2023-09-20

## 2023-09-20 LAB
ANION GAP SERPL CALC-SCNC: 7 MMOL/L (ref 3–18)
BUN SERPL-MCNC: 14 MG/DL (ref 7–18)
BUN/CREAT SERPL: 13 (ref 12–20)
CA-I BLD-MCNC: 9.7 MG/DL (ref 8.5–10.1)
CHLORIDE SERPL-SCNC: 101 MMOL/L (ref 100–111)
CO2 SERPL-SCNC: 28 MMOL/L (ref 21–32)
CREAT SERPL-MCNC: 1.07 MG/DL (ref 0.6–1.3)
ECHO AO ASC DIAM: 3.5 CM
ECHO AO ASCENDING AORTA INDEX: 2.08 CM/M2
ECHO AO ROOT DIAM: 3.1 CM
ECHO AO ROOT INDEX: 1.85 CM/M2
ECHO AV AREA PEAK VELOCITY: 2.6 CM2
ECHO AV AREA VTI: 2.4 CM2
ECHO AV AREA/BSA PEAK VELOCITY: 1.5 CM2/M2
ECHO AV AREA/BSA VTI: 1.4 CM2/M2
ECHO AV MEAN GRADIENT: 3 MMHG
ECHO AV MEAN VELOCITY: 0.8 M/S
ECHO AV PEAK GRADIENT: 5 MMHG
ECHO AV PEAK VELOCITY: 1.1 M/S
ECHO AV VELOCITY RATIO: 0.91
ECHO AV VTI: 27.4 CM
ECHO BSA: 1.72 M2
ECHO EST RA PRESSURE: 3 MMHG
ECHO IVC PROX: 1.7 CM
ECHO LA AREA 2C: 19.8 CM2
ECHO LA AREA 4C: 20 CM2
ECHO LA DIAMETER INDEX: 2.02 CM/M2
ECHO LA DIAMETER: 3.4 CM
ECHO LA MAJOR AXIS: 5.4 CM
ECHO LA MINOR AXIS: 5.9 CM
ECHO LA TO AORTIC ROOT RATIO: 1.1
ECHO LA VOL 2C: 55 ML (ref 22–52)
ECHO LA VOL 4C: 60 ML (ref 22–52)
ECHO LA VOL BP: 60 ML (ref 22–52)
ECHO LA VOL/BSA BIPLANE: 36 ML/M2 (ref 16–34)
ECHO LA VOLUME INDEX A2C: 33 ML/M2 (ref 16–34)
ECHO LA VOLUME INDEX A4C: 36 ML/M2 (ref 16–34)
ECHO LV E' LATERAL VELOCITY: 8 CM/S
ECHO LV E' SEPTAL VELOCITY: 5 CM/S
ECHO LV EDV A2C: 18 ML
ECHO LV EDV A4C: 38 ML
ECHO LV EDV INDEX A4C: 23 ML/M2
ECHO LV EDV NDEX A2C: 11 ML/M2
ECHO LV EJECTION FRACTION A2C: 62 %
ECHO LV EJECTION FRACTION A4C: 69 %
ECHO LV EJECTION FRACTION BIPLANE: 67 % (ref 55–100)
ECHO LV ESV A2C: 7 ML
ECHO LV ESV A4C: 12 ML
ECHO LV ESV INDEX A2C: 4 ML/M2
ECHO LV ESV INDEX A4C: 7 ML/M2
ECHO LV FRACTIONAL SHORTENING: 34 % (ref 28–44)
ECHO LV GLOBAL LONGITUDINAL STRAIN (GLS): -18.9 %
ECHO LV INTERNAL DIMENSION DIASTOLE INDEX: 2.08 CM/M2
ECHO LV INTERNAL DIMENSION DIASTOLIC: 3.5 CM (ref 3.9–5.3)
ECHO LV INTERNAL DIMENSION SYSTOLIC INDEX: 1.37 CM/M2
ECHO LV INTERNAL DIMENSION SYSTOLIC: 2.3 CM
ECHO LV IVSD: 1.2 CM (ref 0.6–0.9)
ECHO LV MASS 2D: 127.3 G (ref 67–162)
ECHO LV MASS INDEX 2D: 75.8 G/M2 (ref 43–95)
ECHO LV POSTERIOR WALL DIASTOLIC: 1.1 CM (ref 0.6–0.9)
ECHO LV RELATIVE WALL THICKNESS RATIO: 0.63
ECHO LVOT AREA: 2.8 CM2
ECHO LVOT AV VTI INDEX: 0.84
ECHO LVOT DIAM: 1.9 CM
ECHO LVOT MEAN GRADIENT: 2 MMHG
ECHO LVOT PEAK GRADIENT: 4 MMHG
ECHO LVOT PEAK VELOCITY: 1 M/S
ECHO LVOT STROKE VOLUME INDEX: 38.8 ML/M2
ECHO LVOT SV: 65.2 ML
ECHO LVOT VTI: 23 CM
ECHO MV A VELOCITY: 1.01 M/S
ECHO MV AREA VTI: 1.8 CM2
ECHO MV E DECELERATION TIME (DT): 224 MS
ECHO MV E VELOCITY: 0.81 M/S
ECHO MV E/A RATIO: 0.8
ECHO MV E/E' LATERAL: 10.13
ECHO MV E/E' RATIO (AVERAGED): 13.16
ECHO MV E/E' SEPTAL: 16.2
ECHO MV LVOT VTI INDEX: 1.55
ECHO MV MAX VELOCITY: 1 M/S
ECHO MV MEAN GRADIENT: 2 MMHG
ECHO MV MEAN VELOCITY: 0.6 M/S
ECHO MV PEAK GRADIENT: 4 MMHG
ECHO MV VTI: 35.7 CM
ECHO PV MAX VELOCITY: 1.1 M/S
ECHO PV PEAK GRADIENT: 4 MMHG
ECHO RA AREA 4C: 15.4 CM2
ECHO RA END SYSTOLIC VOLUME APICAL 4 CHAMBER INDEX BSA: 22 ML/M2
ECHO RA VOLUME BIPLANE METHOD OF DISKS: 37 ML
ECHO RA VOLUME INDEX BP: 22 ML/M2
ECHO RA VOLUME: 37 ML
ECHO RIGHT VENTRICULAR SYSTOLIC PRESSURE (RVSP): 31 MMHG
ECHO RV BASAL DIMENSION: 3 CM
ECHO RV LONGITUDINAL DIMENSION: 6.3 CM
ECHO RV MID DIMENSION: 2.5 CM
ECHO RV TAPSE: 1.7 CM (ref 1.7–?)
ECHO TV REGURGITANT MAX VELOCITY: 2.66 M/S
ECHO TV REGURGITANT PEAK GRADIENT: 28 MMHG
EKG ATRIAL RATE: 68 BPM
EKG DIAGNOSIS: NORMAL
EKG P AXIS: -27 DEGREES
EKG P-R INTERVAL: 196 MS
EKG Q-T INTERVAL: 410 MS
EKG QRS DURATION: 90 MS
EKG QTC CALCULATION (BAZETT): 435 MS
EKG R AXIS: 15 DEGREES
EKG T AXIS: 17 DEGREES
EKG VENTRICULAR RATE: 68 BPM
GLUCOSE BLD STRIP.AUTO-MCNC: 116 MG/DL (ref 70–110)
GLUCOSE BLD STRIP.AUTO-MCNC: 143 MG/DL (ref 70–110)
GLUCOSE SERPL-MCNC: 123 MG/DL (ref 74–99)
MAGNESIUM SERPL-MCNC: 1.6 MG/DL (ref 1.6–2.6)
PERFORMED BY:: ABNORMAL
PERFORMED BY:: ABNORMAL
POTASSIUM SERPL-SCNC: 3.5 MMOL/L (ref 3.5–5.5)
SODIUM SERPL-SCNC: 136 MMOL/L (ref 136–145)
TROPONIN I SERPL HS-MCNC: 75 NG/L (ref 0–54)

## 2023-09-20 PROCEDURE — 93306 TTE W/DOPPLER COMPLETE: CPT

## 2023-09-20 PROCEDURE — 84484 ASSAY OF TROPONIN QUANT: CPT

## 2023-09-20 PROCEDURE — 82962 GLUCOSE BLOOD TEST: CPT

## 2023-09-20 PROCEDURE — 97161 PT EVAL LOW COMPLEX 20 MIN: CPT

## 2023-09-20 PROCEDURE — 6360000002 HC RX W HCPCS: Performed by: PHYSICIAN ASSISTANT

## 2023-09-20 PROCEDURE — 6370000000 HC RX 637 (ALT 250 FOR IP)

## 2023-09-20 PROCEDURE — 80048 BASIC METABOLIC PNL TOTAL CA: CPT

## 2023-09-20 PROCEDURE — 83735 ASSAY OF MAGNESIUM: CPT

## 2023-09-20 PROCEDURE — 6370000000 HC RX 637 (ALT 250 FOR IP): Performed by: PHYSICIAN ASSISTANT

## 2023-09-20 PROCEDURE — 36415 COLL VENOUS BLD VENIPUNCTURE: CPT

## 2023-09-20 PROCEDURE — 2580000003 HC RX 258: Performed by: PHYSICIAN ASSISTANT

## 2023-09-20 PROCEDURE — 97165 OT EVAL LOW COMPLEX 30 MIN: CPT

## 2023-09-20 PROCEDURE — 6370000000 HC RX 637 (ALT 250 FOR IP): Performed by: INTERNAL MEDICINE

## 2023-09-20 RX ORDER — ATENOLOL 25 MG/1
25 TABLET ORAL DAILY
Qty: 30 TABLET | Refills: 5 | Status: SHIPPED | OUTPATIENT
Start: 2023-09-20

## 2023-09-20 RX ORDER — POLYETHYLENE GLYCOL 3350 17 G/17G
17 POWDER, FOR SOLUTION ORAL DAILY PRN
Status: DISCONTINUED | OUTPATIENT
Start: 2023-09-20 | End: 2023-09-20 | Stop reason: HOSPADM

## 2023-09-20 RX ORDER — INSULIN LISPRO 100 [IU]/ML
0-4 INJECTION, SOLUTION INTRAVENOUS; SUBCUTANEOUS
Status: DISCONTINUED | OUTPATIENT
Start: 2023-09-20 | End: 2023-09-20 | Stop reason: HOSPADM

## 2023-09-20 RX ORDER — SODIUM CHLORIDE 9 MG/ML
INJECTION, SOLUTION INTRAVENOUS PRN
Status: DISCONTINUED | OUTPATIENT
Start: 2023-09-20 | End: 2023-09-20 | Stop reason: HOSPADM

## 2023-09-20 RX ORDER — INSULIN GLARGINE 100 [IU]/ML
16 INJECTION, SOLUTION SUBCUTANEOUS NIGHTLY
Status: DISCONTINUED | OUTPATIENT
Start: 2023-09-20 | End: 2023-09-20 | Stop reason: HOSPADM

## 2023-09-20 RX ORDER — ONDANSETRON 4 MG/1
4 TABLET, ORALLY DISINTEGRATING ORAL EVERY 8 HOURS PRN
Status: DISCONTINUED | OUTPATIENT
Start: 2023-09-20 | End: 2023-09-20 | Stop reason: HOSPADM

## 2023-09-20 RX ORDER — ONDANSETRON 2 MG/ML
4 INJECTION INTRAMUSCULAR; INTRAVENOUS EVERY 6 HOURS PRN
Status: DISCONTINUED | OUTPATIENT
Start: 2023-09-20 | End: 2023-09-20 | Stop reason: HOSPADM

## 2023-09-20 RX ORDER — ISOSORBIDE MONONITRATE 30 MG/1
30 TABLET, EXTENDED RELEASE ORAL DAILY
Status: DISCONTINUED | OUTPATIENT
Start: 2023-09-20 | End: 2023-09-20 | Stop reason: HOSPADM

## 2023-09-20 RX ORDER — ASPIRIN 81 MG/1
81 TABLET ORAL DAILY
Status: DISCONTINUED | OUTPATIENT
Start: 2023-09-20 | End: 2023-09-20 | Stop reason: HOSPADM

## 2023-09-20 RX ORDER — ATORVASTATIN CALCIUM 40 MG/1
40 TABLET, FILM COATED ORAL DAILY
Status: DISCONTINUED | OUTPATIENT
Start: 2023-09-20 | End: 2023-09-20 | Stop reason: HOSPADM

## 2023-09-20 RX ORDER — AMLODIPINE BESYLATE 5 MG/1
5 TABLET ORAL DAILY
Qty: 30 TABLET | Refills: 3 | Status: SHIPPED | OUTPATIENT
Start: 2023-09-20 | End: 2023-09-20 | Stop reason: SDUPTHER

## 2023-09-20 RX ORDER — DEXTROSE MONOHYDRATE 100 MG/ML
INJECTION, SOLUTION INTRAVENOUS CONTINUOUS PRN
Status: DISCONTINUED | OUTPATIENT
Start: 2023-09-20 | End: 2023-09-20 | Stop reason: HOSPADM

## 2023-09-20 RX ORDER — CIPROFLOXACIN 500 MG/1
500 TABLET, FILM COATED ORAL 2 TIMES DAILY
Qty: 10 TABLET | Refills: 0 | Status: SHIPPED | OUTPATIENT
Start: 2023-09-20 | End: 2023-09-25

## 2023-09-20 RX ORDER — SODIUM CHLORIDE 0.9 % (FLUSH) 0.9 %
5-40 SYRINGE (ML) INJECTION PRN
Status: DISCONTINUED | OUTPATIENT
Start: 2023-09-20 | End: 2023-09-20 | Stop reason: HOSPADM

## 2023-09-20 RX ORDER — SODIUM CHLORIDE 0.9 % (FLUSH) 0.9 %
5-40 SYRINGE (ML) INJECTION EVERY 12 HOURS SCHEDULED
Status: DISCONTINUED | OUTPATIENT
Start: 2023-09-20 | End: 2023-09-20 | Stop reason: HOSPADM

## 2023-09-20 RX ORDER — HYDROCHLOROTHIAZIDE 25 MG/1
25 TABLET ORAL DAILY
Status: DISCONTINUED | OUTPATIENT
Start: 2023-09-20 | End: 2023-09-20 | Stop reason: HOSPADM

## 2023-09-20 RX ORDER — INSULIN LISPRO 100 [IU]/ML
0-4 INJECTION, SOLUTION INTRAVENOUS; SUBCUTANEOUS NIGHTLY
Status: DISCONTINUED | OUTPATIENT
Start: 2023-09-20 | End: 2023-09-20 | Stop reason: HOSPADM

## 2023-09-20 RX ORDER — POTASSIUM CHLORIDE 750 MG/1
10 TABLET, EXTENDED RELEASE ORAL 2 TIMES DAILY
Status: DISCONTINUED | OUTPATIENT
Start: 2023-09-20 | End: 2023-09-20

## 2023-09-20 RX ORDER — AMLODIPINE BESYLATE 10 MG/1
10 TABLET ORAL DAILY
Qty: 30 TABLET | Refills: 5 | Status: SHIPPED | OUTPATIENT
Start: 2023-09-20

## 2023-09-20 RX ORDER — ACETAMINOPHEN 325 MG/1
650 TABLET ORAL EVERY 6 HOURS PRN
Status: DISCONTINUED | OUTPATIENT
Start: 2023-09-20 | End: 2023-09-20 | Stop reason: HOSPADM

## 2023-09-20 RX ORDER — ENOXAPARIN SODIUM 100 MG/ML
30 INJECTION SUBCUTANEOUS DAILY
Status: DISCONTINUED | OUTPATIENT
Start: 2023-09-20 | End: 2023-09-20 | Stop reason: HOSPADM

## 2023-09-20 RX ORDER — POTASSIUM CHLORIDE 750 MG/1
20 TABLET, EXTENDED RELEASE ORAL 2 TIMES DAILY
Status: DISCONTINUED | OUTPATIENT
Start: 2023-09-20 | End: 2023-09-20 | Stop reason: HOSPADM

## 2023-09-20 RX ORDER — ATENOLOL 25 MG/1
50 TABLET ORAL DAILY
Status: DISCONTINUED | OUTPATIENT
Start: 2023-09-20 | End: 2023-09-20 | Stop reason: HOSPADM

## 2023-09-20 RX ORDER — AMLODIPINE BESYLATE 5 MG/1
5 TABLET ORAL DAILY
Status: DISCONTINUED | OUTPATIENT
Start: 2023-09-20 | End: 2023-09-20 | Stop reason: HOSPADM

## 2023-09-20 RX ORDER — VALSARTAN 80 MG/1
80 TABLET ORAL DAILY
Status: DISCONTINUED | OUTPATIENT
Start: 2023-09-20 | End: 2023-09-20 | Stop reason: HOSPADM

## 2023-09-20 RX ORDER — FAMOTIDINE 20 MG/1
20 TABLET, FILM COATED ORAL 2 TIMES DAILY
Status: DISCONTINUED | OUTPATIENT
Start: 2023-09-20 | End: 2023-09-20 | Stop reason: HOSPADM

## 2023-09-20 RX ORDER — LANOLIN ALCOHOL/MO/W.PET/CERES
400 CREAM (GRAM) TOPICAL DAILY
Status: DISCONTINUED | OUTPATIENT
Start: 2023-09-20 | End: 2023-09-20 | Stop reason: HOSPADM

## 2023-09-20 RX ORDER — ACETAMINOPHEN 650 MG/1
650 SUPPOSITORY RECTAL EVERY 6 HOURS PRN
Status: DISCONTINUED | OUTPATIENT
Start: 2023-09-20 | End: 2023-09-20 | Stop reason: HOSPADM

## 2023-09-20 RX ADMIN — ASPIRIN 81 MG: 81 TABLET, COATED ORAL at 08:50

## 2023-09-20 RX ADMIN — ATORVASTATIN CALCIUM 40 MG: 40 TABLET, FILM COATED ORAL at 08:50

## 2023-09-20 RX ADMIN — SODIUM CHLORIDE, PRESERVATIVE FREE 10 ML: 5 INJECTION INTRAVENOUS at 08:55

## 2023-09-20 RX ADMIN — Medication 400 MG: at 11:59

## 2023-09-20 RX ADMIN — SODIUM CHLORIDE, PRESERVATIVE FREE 10 ML: 5 INJECTION INTRAVENOUS at 08:54

## 2023-09-20 RX ADMIN — ISOSORBIDE MONONITRATE 30 MG: 30 TABLET, EXTENDED RELEASE ORAL at 08:50

## 2023-09-20 RX ADMIN — AMLODIPINE BESYLATE 5 MG: 5 TABLET ORAL at 08:51

## 2023-09-20 RX ADMIN — FAMOTIDINE 20 MG: 20 TABLET, FILM COATED ORAL at 08:48

## 2023-09-20 RX ADMIN — ATENOLOL 50 MG: 25 TABLET ORAL at 08:48

## 2023-09-20 RX ADMIN — POTASSIUM CHLORIDE 20 MEQ: 750 TABLET, EXTENDED RELEASE ORAL at 08:50

## 2023-09-20 RX ADMIN — VALSARTAN 80 MG: 80 TABLET, FILM COATED ORAL at 08:51

## 2023-09-20 RX ADMIN — ENOXAPARIN SODIUM 30 MG: 100 INJECTION SUBCUTANEOUS at 08:48

## 2023-09-20 ASSESSMENT — PAIN SCALES - GENERAL
PAINLEVEL_OUTOF10: 0

## 2023-09-20 NOTE — ASSESSMENT & PLAN NOTE
-No stools or diarrhea while in the ED or on service  -Stool polys and C.  Difficile  -Continue to monitor

## 2023-09-20 NOTE — H&P
Hospitalist Admission Note    NAME: Tiffany Killian   :  1935   MRN:  [id number]     Date/Time:  2023 6:17 AM    Attending: Apoorva Haywood MD  Patient PCP: Mary Noel MD  ______________________________________________________________________  Given the patient's current clinical presentation, I have a high level of concern for decompensation if discharged from the emergency department. Complex decision making was performed, which includes reviewing the patient's available past medical records, lab, and x-rays. This case was discussed with Emergency Department attending physician for continuity of care, transition/transfer of care to our service. Assessment / Plan:  Patient Active Problem List    Diagnosis Date Noted    Stroke due to occlusion of left posterior cerebral artery ([address]) 2023    Hypertension 2022    Diabetes mellitus type 2, controlled ([address]) 2022    Diarrhea 2023    Electrolyte imbalance 2023    CAD (coronary artery disease) 2023    Chest pain 2022        Hypertension  -Elevated on arrival 200s/100s  -BP normalized after nitroglycerin  -Continue with Diovan, Imdur, atenolol, hydrochlorothiazide  -Hydralazine as needed      Chest pain  -No EKG changes  -Troponins at baseline  -No complaints of chest pain during evaluation  -Cardiology consultation  -Sees Dr. Berenice Craig and being evaluated for possible pacemaker  -Continue on telemetry    Diarrhea      -No stools or diarrhea while in the ED or on service  -Stool polys and C.  Difficile  -Continue to monitor    Diabetes mellitus type 2, controlled ([address])      -Lantus 16 units bedtime  -Sliding scale  -Accu-Cheks before meals and bedtime  -Hypoglycemia protocol    Electrolyte imbalance      -Potassium, sodium, magnesium low  -IV fluids initiated  -Repleted in the ED  -Check BMP this morning        Code Status: Full      DVT Prophylaxis: Lovenox SubQ    GI Prophylaxis: not indicated

## 2023-09-20 NOTE — TELEPHONE ENCOUNTER
Patient is being discharged from hospital today. Hospital called to schedule an appointment. Please review and advise.

## 2023-09-20 NOTE — ASSESSMENT & PLAN NOTE
-Elevated on arrival 200s/100s  -BP normalized after nitroglycerin  -Continue with Diovan, Imdur, atenolol, hydrochlorothiazide  -Hydralazine as needed

## 2023-09-20 NOTE — ASSESSMENT & PLAN NOTE
-No EKG changes  -Troponins at baseline  -No complaints of chest pain during evaluation  -Cardiology consultation  -Sees Dr. Kendra Natarajan and being evaluated for possible pacemaker  -Continue on telemetry

## 2023-09-20 NOTE — DISCHARGE SUMMARY
Discharge Summary       Patient ID:  Malgorzata Crowell,   82 y.o., female  1935    PCP:  Max Ball MD    Admit Date: 9/19/2023  7:38 PM  Discharge Date:  No discharge date for patient encounter.   Length of stay: 1 day(s)  Code Status: Full Code  Discharging physician: Nolan Johnson MD    Admission Diagnoses:   Dehydration [E86.0]  Hypokalemia [E87.6]  Hypomagnesemia [E83.42]  Chest pain [R07.9]  Hypertensive urgency [I16.0]  Acute cystitis without hematuria [N30.00]  Chest pain, unspecified type [R07.9]  Diarrhea, unspecified type [R19.7]    Discharge Medications     Medication List        START taking these medications      amLODIPine 10 MG tablet  Commonly known as: NORVASC  Take 1 tablet by mouth daily     ciprofloxacin 500 MG tablet  Commonly known as: CIPRO  Take 1 tablet by mouth 2 times daily for 5 days            CHANGE how you take these medications      atenolol 25 MG tablet  Commonly known as: TENORMIN  Take 1 tablet by mouth daily  What changed:   medication strength  how much to take     potassium chloride 10 MEQ extended release tablet  Commonly known as: KLOR-CON M  Take 1 tablet by mouth 2 times daily for 4 days  What changed: when to take this            CONTINUE taking these medications      aspirin 81 MG EC tablet     atorvastatin 40 MG tablet  Commonly known as: LIPITOR     isosorbide mononitrate 30 MG extended release tablet  Commonly known as: IMDUR     Lantus SoloStar 100 UNIT/ML injection pen  Generic drug: insulin glargine     sertraline 25 MG tablet  Commonly known as: ZOLOFT     valsartan 80 MG tablet  Commonly known as: DIOVAN            STOP taking these medications      hydroCHLOROthiazide 25 MG tablet  Commonly known as: HYDRODIURIL     omeprazole 20 MG delayed release capsule  Commonly known as: PRILOSEC               Where to Get Your Medications        [id number]

## 2023-09-20 NOTE — ASSESSMENT & PLAN NOTE
-Lantus 16 units bedtime  -Sliding scale  -Accu-Cheks before meals and bedtime  -Hypoglycemia protocol

## 2023-09-20 NOTE — PROGRESS NOTES
Goals-   1- Toileting wih mod I   2- UE/LE dressing with mod I     OCCUPATIONAL THERAPY EVALUATION    Patient: Dora Dahl (69 y.o. female)  Date: 9/20/2023  Primary Diagnosis: Dehydration [E86.0]  Hypokalemia [E87.6]  Hypomagnesemia [E83.42]  Chest pain [R07.9]  Hypertensive urgency [I16.0]  Acute cystitis without hematuria [N30.00]  Chest pain, unspecified type [R07.9]  Diarrhea, unspecified type [R19.7]       Precautions: fall, dizziness      PLOF: pt reports she lives at home with family and had been I with all ADLs and mobility    ASSESSMENT :  Based on the objective data described below, the patient presents with declines in basic ADLs and mobility. Patient will benefit from skilled intervention to address the above impairments. Patient's rehabilitation potential is considered to be good  Factors which may influence rehabilitation potential include:   []             None noted  []             Mental ability/status  [x]             Medical condition  []             Home/family situation and support systems  [x]             Safety awareness  [x]             Pain tolerance/management  []             Other:      PLAN :  Recommendations and Planned Interventions:   [x]               Self Care Training                  [x]      Therapeutic Activities  [x]               Functional Mobility Training   []      Cognitive Retraining  [x]               Therapeutic Exercises           []      Endurance Activities  []               Balance Training                    []      Neuromuscular Re-Education  []               Visual/Perceptual Training     [x]      Home Safety Training  [x]               Patient Education                   [x]      Family Training/Education  []               Other (comment):    Frequency/Duration: Patient will be followed by occupational therapy 1-2 times per day/4-7 days per week to address goals.   Discharge Recommendations: home with family   Further Equipment Recommendations for Discharge:

## 2023-09-20 NOTE — PROGRESS NOTES
2250  Received care of pt via stretcher from ER to room 243. Pt is alert and oriented x 4. Pt complaint free at this time. Routine assessment and vs completed. Routine admission completed. Daughter at bedside. 2330  Pt given sandwich and diet gingerale. 0200  Sleeping and arouses easily. Pt is complaint free at this time. 0400  Pt awakened for vitals and is complaint free at this time. 0600  Pt sleeping and arouses easily. Purewick draining clear yellow urine and brief clean and dry. Pt denies pair or discomfort.

## 2023-09-20 NOTE — THERAPY EVALUATION
PHYSICAL THERAPY EVALUATION AND DISCHARGE    Patient: Becky Grayson (48 y.o. female)  Date: 9/20/2023  Physical Therapy Time:   PT Individual Minutes  Time In: 0940  Time Out: 2046  Minutes: 14  Timed Minute Breakdown:    Low complexity evaluation 14 minutes     Primary Diagnosis: Dehydration [E86.0]  Hypokalemia [E87.6]  Hypomagnesemia [E83.42]  Chest pain [R07.9]  Hypertensive urgency [I16.0]  Acute cystitis without hematuria [N30.00]  Chest pain, unspecified type [R07.9]  Diarrhea, unspecified type [R19.7] Chest pain  Present on Admission:   Chest pain   Hypertension   Diabetes mellitus type 2, controlled ([address])   Diarrhea   Electrolyte imbalance        Precautions: none         Assessment: Upon entry, pt sitting up in chair. Pt mobility and LE strength assessed, as well as transfers. Pt ambulated RW down hallway and returned to seated in chair next to bed. Pt left with all needs met, CBWR, nursing notified  Performance Deficits/Impairments: Decreased strength;Decreased endurance; Increased pain  Treatment Diagnosis: difficulty in walking  Therapy Prognosis: Good  Decision Making: Low Complexity  No Skilled PT: Independent with ADL's  Discharge Recommendations: No therapy recommended at discharge;Home with assist PRN    Conditions Requiring skilled therapeutic intervention:  Performance Deficits/Impairments: Decreased strength;Decreased endurance; Increased pain     Evaluation Activity Tolerance:   Activity Tolerance  Activity Tolerance: Patient tolerated evaluation without incident    Equipment Recommendations for Discharge:   Pt has cane at home already    AM-PAC  AM-PAC Inpatient Mobility Raw Score : 23; Current research shows that an AM-PAC score of 17 or less is typically not associated with a discharge to the patient's home setting, whereas a score of 18 or greater is typically associated with a discharge to the patient's home setting.     Factors which may influence rehabilitation potential include:  Pain

## 2023-09-20 NOTE — ED NOTES
TRANSFER - OUT REPORT:    Verbal report given to Haley on Buddy Olszewski  being transferred to  for routine progression of patient care       Report consisted of patient's Situation, Background, Assessment and   Recommendations(SBAR). Information from the following report(s) Nurse Handoff Report was reviewed with the receiving nurse. [facility] Fall Assessment:                           Lines:   Peripheral IV 09/19/23 Posterior;Right Hand (Active)        Opportunity for questions and clarification was provided.       Patient transported with:  Monitor and Registered Nurse          Trevor Tsai RN  09/19/23 3404

## 2023-09-20 NOTE — CONSULTS
-Discontinue HCTZ previously due to hyponatremia     Nonobstructive coronary artery disease:   -LHC as above   -Continue with statin and risk factor modification   -Continue antianginals Imdur and Norvasc     Hyperlipidemia:    -Continue statin  -Lipid monitoring by primary   -LDL goal < 70    Sinus bradycardia: HR 40-60's. Likely contributing to dizziness.   -Recent Holter 9/8/2023 Shows bradycardia burden 56%. No AF, VT, heart blocks or pauses. -Reduced atenolol 25 mg as above   -Echo as noted above  -HIGH risk for falls   - OP EP appointment with Dr. Iron Arreola on 9/28/23 at 1030    Hypomagnesemia: Mag 1.4 on admission, likely related to diarrhea. -Repeat mag this am 1.6  -Start mag oxide 400 mg daily     Hypokalemia: K 3.2 on admission   -Repeat K this am 3.5  -Keep serum potassium between 4-5 and serum magnesium > 2.   -Continue potassium supplementation      DM type II:  -Managed by primary     Thank you for involving us in the care of this patient. [address] follow-up appointments scheduled with Dr. Kendra Natarajan on 9/26/2023 at [phone] and Dr. Iron Arreola 9/28/23 at 56. Please do not hesitate to call me or Dr. Kendra Natarajan if additional questions arise.     ASTRID Rios - CNP  9/20/2023

## 2023-09-20 NOTE — ASSESSMENT & PLAN NOTE
-Potassium, sodium, magnesium low  -IV fluids initiated  -Repleted in the ED  -Check BMP this morning

## 2023-09-20 NOTE — PROGRESS NOTES
0700 - Assumed care of patient. Report received from Julio Tavarez RN. Patient laying in bed alert. Denied chest pain, palpitations. Denied needs. No distress noted. 0715 - Pt assessment completed. VSS.    0850 - AM meds given. Patient ambulated to bathroom with assist x1. C/O continued dizziness when standing. Stated had BM (diarrhea), unable to obtain sample, hat placed for next BM, patient aware and verbalized understanding of needed stool sample. Patient sitting up in chair, denied dizziness. Call bell in reach. 1155 - TTE completed. Lunch tray set up.    1500 - PIV removed. Paperwork reviewed and signed. Appointments and medication changes highlighted and stressed. Patient verbalized understanding. 1520 - Patient taken to front exit via wheelchair by this nurse. Patient left facility with daughter.     End of shift

## 2023-09-22 LAB
BACTERIA SPEC CULT: ABNORMAL
BACTERIA SPEC CULT: ABNORMAL
COLONY COUNT, CNT: ABNORMAL
Lab: ABNORMAL

## (undated) DEVICE — GLIDESHEATH SLENDER STAINLESS STEEL KIT: Brand: GLIDESHEATH SLENDER

## (undated) DEVICE — GUIDEWIRE VASC L260CM DIA0.035IN TIP L3MM STD EXCHG PTFE J

## (undated) DEVICE — 3M™ TEGADERM™ TRANSPARENT FILM DRESSING FRAME STYLE, 1626W, 4 IN X 4-3/4 IN (10 CM X 12 CM), 50/CT 4CT/CASE: Brand: 3M™ TEGADERM™

## (undated) DEVICE — 3M™ STERI-DRAPE™ SMALL DRAPE WITH ADHESIVE APERTURE 1092 25/BX,4/CS&#X20;: Brand: STERI-DRAPE™

## (undated) DEVICE — SYRINGE MED 10ML RED POLYCARB BRL FIX M LUER CONN FLAT GRP

## (undated) DEVICE — SURGICAL PROCEDURE TRAY CRD CATH 3 PRT

## (undated) DEVICE — 48" PROBE COVER W/GEL, ULTRASOUND, STERILE: Brand: SITE-RITE

## (undated) DEVICE — SYRINGE MED 10ML PUR GAM COMPATIBLE POLYCARB FIX M LUER CONN

## (undated) DEVICE — GLIDESHEATH SLENDER NITINOL HYDROPHILIC COATED INTRODUCER SHEATH: Brand: GLIDESHEATH SLENDER

## (undated) DEVICE — Device

## (undated) DEVICE — ADAPTER IV TBNG CLR POLYCARB DBL M LUERLOCK

## (undated) DEVICE — CATH 5F 100CM JR40 -- DXTERITY

## (undated) DEVICE — BAG DRN WSTE 48X72IN 1400ML -- MERIT DISPOSABLE DEPOT

## (undated) DEVICE — CATHETER DIAG 5FR L100CM LUMN ID0.047IN JL3.5 CRV 0 SIDE H